# Patient Record
Sex: FEMALE | Race: WHITE | NOT HISPANIC OR LATINO | Employment: FULL TIME | URBAN - NONMETROPOLITAN AREA
[De-identification: names, ages, dates, MRNs, and addresses within clinical notes are randomized per-mention and may not be internally consistent; named-entity substitution may affect disease eponyms.]

---

## 2021-04-30 ENCOUNTER — OFFICE VISIT (OUTPATIENT)
Dept: INTERNAL MEDICINE | Facility: CLINIC | Age: 26
End: 2021-04-30

## 2021-04-30 VITALS
WEIGHT: 222 LBS | TEMPERATURE: 98.3 F | HEIGHT: 69 IN | DIASTOLIC BLOOD PRESSURE: 86 MMHG | BODY MASS INDEX: 32.88 KG/M2 | OXYGEN SATURATION: 99 % | SYSTOLIC BLOOD PRESSURE: 126 MMHG | HEART RATE: 96 BPM

## 2021-04-30 DIAGNOSIS — F51.04 PSYCHOPHYSIOLOGICAL INSOMNIA: ICD-10-CM

## 2021-04-30 DIAGNOSIS — Z76.89 ENCOUNTER TO ESTABLISH CARE: ICD-10-CM

## 2021-04-30 DIAGNOSIS — E66.09 CLASS 1 OBESITY DUE TO EXCESS CALORIES WITHOUT SERIOUS COMORBIDITY WITH BODY MASS INDEX (BMI) OF 32.0 TO 32.9 IN ADULT: ICD-10-CM

## 2021-04-30 DIAGNOSIS — J30.2 SEASONAL ALLERGIC RHINITIS, UNSPECIFIED TRIGGER: ICD-10-CM

## 2021-04-30 DIAGNOSIS — G43.009 MIGRAINE WITHOUT AURA AND WITHOUT STATUS MIGRAINOSUS, NOT INTRACTABLE: Primary | ICD-10-CM

## 2021-04-30 DIAGNOSIS — F41.9 ANXIETY: ICD-10-CM

## 2021-04-30 DIAGNOSIS — R53.83 FATIGUE, UNSPECIFIED TYPE: ICD-10-CM

## 2021-04-30 PROCEDURE — 99204 OFFICE O/P NEW MOD 45 MIN: CPT | Performed by: NURSE PRACTITIONER

## 2021-04-30 RX ORDER — AMITRIPTYLINE HYDROCHLORIDE 10 MG/1
10 TABLET, FILM COATED ORAL NIGHTLY
Qty: 30 TABLET | Refills: 0 | Status: SHIPPED | OUTPATIENT
Start: 2021-04-30 | End: 2021-05-21 | Stop reason: SDUPTHER

## 2021-04-30 RX ORDER — LEVOCETIRIZINE DIHYDROCHLORIDE 5 MG/1
5 TABLET, FILM COATED ORAL EVERY EVENING
Qty: 30 TABLET | Refills: 3 | Status: SHIPPED | OUTPATIENT
Start: 2021-04-30 | End: 2021-08-06

## 2021-04-30 NOTE — PROGRESS NOTES
Sp  Office Visit      Patient Name: Conchita Liao  : 1995   MRN: [id number]   Care Team: Patient Care Team:  Hannah Mckinney APRN as PCP - General (Nurse Practitioner)    Chief Complaint  Headache and Establish Care    Subjective     Subjective      Conchita Liao presents to [facility] to establish care with me and acute concerns of migraines, seasonal allergies, and mild anxiety. PMH includes migraine without aura and no significant medical history. Migraines are brought on by season changes and with her menstral cycle. They are relieved by rest and tylenol helps minimally. Located on right temple. Endorses nausea with her migraines. Has tried immitrex but does not help, has not used in several years. Feels as though migraines are worsening. Happening once per week for about 2 months.  Headache today rated as a 3 out of 10.  No current daily medications. Has never been on preventative therapy.     Allergy symptoms worse with season changes.  Endorses rhinorrhea, sore throat, and ear fullness.  Did have severe nosebleed in the last year that required an ER visit with cauterization by ENT.  She was told at that time she should undergo allergy testing, avoid Flonase, and avoid aspirin.  No more instances since then.  No known bleeding disorder.  Does not take daily oral antihistamine using Benadryl as needed at nightly.    Anxiety symptoms started 5 years ago but has worsened in the past few months Worse with stress and is an  so in busy season currently. Feels overwhelmed like she is constantly about to bubble over, fidgeting/picking at her fingers, crying spells, mild lack of motivation, low energy, trouble falling and staying asleep. No thoughts of suicide/homicide. Has never been to counseling for anxiety symptoms or been treated by a physician. This is the first time she has ever brought it up to a healthcare professional.     PHQ-9 Depression  Screening  Little interest or pleasure in doing things? 1   Feeling down, depressed, or hopeless? 1   Trouble falling or staying asleep, or sleeping too much? 1   Feeling tired or having little energy? 1   Poor appetite or overeating? 0   Feeling bad about yourself - or that you are a failure or have let yourself or your family down? 0   Trouble concentrating on things, such as reading the newspaper or watching television? 1   Moving or speaking so slowly that other people could have noticed? Or the opposite - being so fidgety or restless that you have been moving around a lot more than usual? 0   Thoughts that you would be better off dead, or of hurting yourself in some way? 0   PHQ-9 Total Score 5   If you checked off any problems, how difficult have these problems made it for you to do your work, take care of things at home, or get along with other people? Somewhat difficult         Review of Systems   Constitutional: Positive for fatigue. Negative for appetite change and fever.   HENT: Negative for congestion, sore throat and trouble swallowing.    Eyes: Negative for blurred vision and visual disturbance.   Respiratory: Negative for cough, shortness of breath and wheezing.    Cardiovascular: Negative for chest pain, palpitations and leg swelling.   Gastrointestinal: Negative for abdominal pain, blood in stool, constipation, diarrhea and nausea.   Endocrine: Negative for polydipsia, polyphagia and polyuria.   Genitourinary: Negative for dysuria.   Musculoskeletal: Negative for arthralgias, back pain and myalgias.   Skin: Negative for rash.   Neurological: Positive for headache. Negative for dizziness, weakness and light-headedness.   Psychiatric/Behavioral: Negative for sleep disturbance and depressed mood. The patient is nervous/anxious.        Objective     Objective   Vital Signs:   /86 (BP Location: Left arm, Patient Position: Sitting, Cuff Size: Adult)   Pulse 96   Temp 98.3 °F (36.8 °C) (Temporal)   " Ht 175.3 cm (69\")   Wt 101 kg (222 lb)   SpO2 99%   BMI 32.78 kg/m²     Physical Exam  Vitals and nursing note reviewed.   Constitutional:       General: She is not in acute distress.     Appearance: Normal appearance. She is not toxic-appearing.   HENT:      Right Ear: A middle ear effusion is present. Tympanic membrane is retracted.      Left Ear: A middle ear effusion is present. Tympanic membrane is retracted.      Nose: Rhinorrhea present.      Right Turbinates: Enlarged and pale.      Left Turbinates: Enlarged and pale.   Eyes:      Extraocular Movements: Extraocular movements intact.      Pupils: Pupils are equal, round, and reactive to light.   Neck:      Vascular: No carotid bruit.   Cardiovascular:      Rate and Rhythm: Normal rate and regular rhythm.      Heart sounds: Normal heart sounds. No murmur heard.     Pulmonary:      Effort: Pulmonary effort is normal. No respiratory distress.      Breath sounds: Normal breath sounds. No wheezing.   Abdominal:      General: Bowel sounds are normal. There is no distension.      Palpations: Abdomen is soft.      Tenderness: There is no abdominal tenderness.   Musculoskeletal:         General: Normal range of motion.      Cervical back: Neck supple. No tenderness.   Skin:     General: Skin is warm and dry.      Findings: No rash.   Neurological:      General: No focal deficit present.      Mental Status: She is alert.      Motor: No weakness.      Deep Tendon Reflexes: Reflexes normal.   Psychiatric:         Mood and Affect: Mood normal.         Behavior: Behavior normal.        Assessment / Plan      Assessment/Plan   Problem List Items Addressed This Visit        Neuro    Migraine without aura and without status migrainosus, not intractable - Primary    Relevant Medications    amitriptyline (ELAVIL) 10 MG tablet    Other Relevant Orders    CBC No Differential    Hepatitis C antibody    Comprehensive metabolic panel    TSH Rfx On Abnormal To Free T4    Lipid " panel    Keep headache diary to define triggers to avoid.  No relief with Imitrex, could consider Nurtec for abortive therapy.  Is being placed on amitriptyline for anxiety which could likely serve as preventative therapy for her migraines.  Educated and discussed this in detail with her.  Discussed red flag symptoms she should present to the ED with and she verbalizes understanding.  Requiring no intervention today as her pain is mild.  Can continue using Tylenol as needed.  Neuro exam normal today.      Other Visit Diagnoses     Encounter to establish care        Psychophysiological insomnia        Relevant Orders    CBC No Differential    Hepatitis C antibody    Comprehensive metabolic panel    TSH Rfx On Abnormal To Free T4    Lipid panel    Anxiety        Relevant Orders    CBC No Differential    Hepatitis C antibody    Comprehensive metabolic panel    TSH Rfx On Abnormal To Free T4    Lipid panel    Fatigue, unspecified type        Relevant Orders    CBC No Differential    Hepatitis C antibody    Comprehensive metabolic panel    TSH Rfx On Abnormal To Free T4    Lipid panel    Labs as above to rule out underlying causes for fatigue and anxiety.  Likely related to her mood.  Start amitriptyline.  Discussed side effects in detail including anticholinergic effects, risk for suicidal/homicidal ideations, and drowsiness.  Instructed to take nightly.  Discussed healthy diet, exercise, and coping mechanisms.  If she develops any suicidal/homicidal ideations she will stop the medication immediately, present to the ED for evaluation, and let me know.Will follow-up in 1 month.     Class 1 obesity due to excess calories without serious comorbidity with body mass index (BMI) of 32.0 to 32.9 in adult        Relevant Orders    CBC No Differential    Hepatitis C antibody    Comprehensive metabolic panel    TSH Rfx On Abnormal To Free T4    Lipid panel    Labs as above. Healthy diet and exercise recommended.    Seasonal  allergic rhinitis, unspecified trigger        Relevant Orders    CBC No Differential    Hepatitis C antibody    Comprehensive metabolic panel    TSH Rfx On Abnormal To Free T4    Lipid panel    xyzal nightly.  If symptoms unrelieved by daily antihistamine will refer to allergy for testing and evaluation for injections.            Follow Up   Return in about 1 month (around 5/30/2021), or if symptoms worsen or fail to improve, for Next scheduled follow up.  Patient was given instructions and counseling regarding her condition or for health maintenance advice. Please see specific information pulled into the AVS if appropriate.     KARIN Cooper  [facility]

## 2021-05-01 LAB
ALBUMIN SERPL-MCNC: 5.1 G/DL (ref 3.5–5.2)
ALBUMIN/GLOB SERPL: 2.1 G/DL
ALP SERPL-CCNC: 76 U/L (ref 39–117)
ALT SERPL-CCNC: 15 U/L (ref 1–33)
AST SERPL-CCNC: 14 U/L (ref 1–32)
BILIRUB SERPL-MCNC: 0.3 MG/DL (ref 0–1.2)
BUN SERPL-MCNC: 10 MG/DL (ref 6–20)
BUN/CREAT SERPL: 14.1 (ref 7–25)
CALCIUM SERPL-MCNC: 9.9 MG/DL (ref 8.6–10.5)
CHLORIDE SERPL-SCNC: 104 MMOL/L (ref 98–107)
CHOLEST SERPL-MCNC: 188 MG/DL (ref 0–200)
CO2 SERPL-SCNC: 26 MMOL/L (ref 22–29)
CREAT SERPL-MCNC: 0.71 MG/DL (ref 0.57–1)
ERYTHROCYTE [DISTWIDTH] IN BLOOD BY AUTOMATED COUNT: 11.8 % (ref 12.3–15.4)
GLOBULIN SER CALC-MCNC: 2.4 GM/DL
GLUCOSE SERPL-MCNC: 96 MG/DL (ref 65–99)
HCT VFR BLD AUTO: 41.4 % (ref 34–46.6)
HCV AB S/CO SERPL IA: <0.1 S/CO RATIO (ref 0–0.9)
HDLC SERPL-MCNC: 50 MG/DL (ref 40–60)
HGB BLD-MCNC: 14.1 G/DL (ref 12–15.9)
LDLC SERPL CALC-MCNC: 126 MG/DL (ref 0–100)
MCH RBC QN AUTO: 31.4 PG (ref 26.6–33)
MCHC RBC AUTO-ENTMCNC: 34.1 G/DL (ref 31.5–35.7)
MCV RBC AUTO: 92.2 FL (ref 79–97)
PLATELET # BLD AUTO: 262 10*3/MM3 (ref 140–450)
POTASSIUM SERPL-SCNC: 4.1 MMOL/L (ref 3.5–5.2)
PROT SERPL-MCNC: 7.5 G/DL (ref 6–8.5)
RBC # BLD AUTO: 4.49 10*6/MM3 (ref 3.77–5.28)
SODIUM SERPL-SCNC: 140 MMOL/L (ref 136–145)
TRIGL SERPL-MCNC: 65 MG/DL (ref 0–150)
TSH SERPL DL<=0.005 MIU/L-ACNC: 2.22 UIU/ML (ref 0.27–4.2)
VLDLC SERPL CALC-MCNC: 12 MG/DL (ref 5–40)
WBC # BLD AUTO: 5.41 10*3/MM3 (ref 3.4–10.8)

## 2021-05-21 ENCOUNTER — OFFICE VISIT (OUTPATIENT)
Dept: INTERNAL MEDICINE | Facility: CLINIC | Age: 26
End: 2021-05-21

## 2021-05-21 VITALS
DIASTOLIC BLOOD PRESSURE: 82 MMHG | HEIGHT: 69 IN | OXYGEN SATURATION: 99 % | HEART RATE: 116 BPM | TEMPERATURE: 97.8 F | SYSTOLIC BLOOD PRESSURE: 120 MMHG | BODY MASS INDEX: 33.06 KG/M2 | WEIGHT: 223.2 LBS

## 2021-05-21 DIAGNOSIS — G43.009 MIGRAINE WITHOUT AURA AND WITHOUT STATUS MIGRAINOSUS, NOT INTRACTABLE: ICD-10-CM

## 2021-05-21 DIAGNOSIS — R04.0 FREQUENT NOSEBLEEDS: Primary | ICD-10-CM

## 2021-05-21 DIAGNOSIS — F41.9 ANXIETY: ICD-10-CM

## 2021-05-21 DIAGNOSIS — J32.9 CHRONIC SINUSITIS, UNSPECIFIED LOCATION: ICD-10-CM

## 2021-05-21 PROCEDURE — 99214 OFFICE O/P EST MOD 30 MIN: CPT | Performed by: NURSE PRACTITIONER

## 2021-05-21 RX ORDER — AMITRIPTYLINE HYDROCHLORIDE 10 MG/1
10 TABLET, FILM COATED ORAL NIGHTLY
Qty: 30 TABLET | Refills: 4 | Status: SHIPPED | OUTPATIENT
Start: 2021-05-21 | End: 2021-11-02 | Stop reason: SDUPTHER

## 2021-05-21 NOTE — PROGRESS NOTES
Office Visit      Patient Name: Conchita Liao  : 1995   MRN: [id number]   Care Team: Patient Care Team:  Hannah Mckinney APRN as PCP - General (Family Medicine)    Chief Complaint  Anxiety, Insomnia, and Obesity    Subjective     Subjective      Conchita Liao presents to [facility] for follow-up. Saw 2 weeks ago for migraines and anxiety. Started on amitriptyline, denies adverse effects. Feels like anxiety is slightly better just finished up busy season at work so hoping that helps. Migraines are improved has only had one mild migraine yesterday that was easily aborted by rest. Has been taking xyzal for allergies and has had good results.   History of frequent nosebleeds. In early  had a major nosebleed requiring cauterization by ENT on right nostril. Was seeing ENT and supposed to have more testing done but was told to come back after the pandemic. This was also in [facility] where she was living at the time. Requesting new referral to ENT close to [place] today.     Review of Systems   Constitutional: Negative for appetite change, fatigue and fever.   HENT: Negative for congestion, sore throat and trouble swallowing.    Eyes: Negative for blurred vision and visual disturbance.   Respiratory: Negative for cough, shortness of breath and wheezing.    Cardiovascular: Negative for chest pain, palpitations and leg swelling.   Gastrointestinal: Negative for abdominal pain, blood in stool, constipation, diarrhea and nausea.   Endocrine: Negative for polydipsia, polyphagia and polyuria.   Genitourinary: Negative for dysuria.   Musculoskeletal: Negative for arthralgias, back pain and myalgias.   Skin: Negative for rash.   Neurological: Negative for dizziness, weakness, light-headedness and headache.   Psychiatric/Behavioral: Negative for sleep disturbance and depressed mood. The patient is nervous/anxious.        Objective     Objective   Vital Signs:   BP  "120/82   Pulse 116   Temp 97.8 °F (36.6 °C) (Temporal)   Ht 175.3 cm (69\")   Wt 101 kg (223 lb 3.2 oz)   SpO2 99%   BMI 32.96 kg/m²     Physical Exam  Vitals and nursing note reviewed.   Constitutional:       General: She is not in acute distress.     Appearance: Normal appearance. She is not toxic-appearing.   HENT:      Nose:      Right Turbinates: Swollen.      Left Turbinates: Swollen.   Eyes:      Pupils: Pupils are equal, round, and reactive to light.   Neck:      Vascular: No carotid bruit.   Cardiovascular:      Rate and Rhythm: Normal rate and regular rhythm.      Heart sounds: Normal heart sounds. No murmur heard.     Pulmonary:      Effort: Pulmonary effort is normal. No respiratory distress.      Breath sounds: Normal breath sounds. No wheezing.   Abdominal:      General: Bowel sounds are normal. There is no distension.      Palpations: Abdomen is soft.      Tenderness: There is no abdominal tenderness.   Musculoskeletal:         General: Normal range of motion.      Cervical back: Neck supple. No tenderness.   Skin:     General: Skin is warm and dry.      Findings: No rash.   Neurological:      General: No focal deficit present.      Mental Status: She is alert.   Psychiatric:         Mood and Affect: Mood normal.         Behavior: Behavior normal.          Assessment / Plan      Assessment/Plan   Problem List Items Addressed This Visit        Mental Health    Anxiety    Stable.  Continue Elavil at current dose.  Encourage sleep hygiene, healthy diet, and exercise.       Neuro    Migraine without aura and without status migrainosus, not intractable    Relevant Medications    amitriptyline (ELAVIL) 10 MG tablet    Controlled.  Continue Elavil and ibuprofen as abortive therapy for migraine.  If symptoms worsen have encouraged her to RTC sooner.      Other Visit Diagnoses     Frequent nosebleeds    -  Primary    Relevant Orders    Ambulatory Referral to ENT (Otolaryngology)    Chronic sinusitis, " unspecified location        Relevant Orders    Ambulatory Referral to ENT (Otolaryngology)    Referral to ENT today per her request.           Follow Up   Return in about 3 months (around 8/21/2021) for Next scheduled follow up.  Patient was given instructions and counseling regarding her condition or for health maintenance advice. Please see specific information pulled into the AVS if appropriate.     KARIN Cooper  [facility] [place]

## 2021-08-06 ENCOUNTER — OFFICE VISIT (OUTPATIENT)
Dept: INTERNAL MEDICINE | Facility: CLINIC | Age: 26
End: 2021-08-06

## 2021-08-06 VITALS
SYSTOLIC BLOOD PRESSURE: 124 MMHG | BODY MASS INDEX: 35.22 KG/M2 | TEMPERATURE: 97.6 F | HEART RATE: 97 BPM | RESPIRATION RATE: 16 BRPM | HEIGHT: 69 IN | OXYGEN SATURATION: 97 % | DIASTOLIC BLOOD PRESSURE: 80 MMHG | WEIGHT: 237.8 LBS

## 2021-08-06 DIAGNOSIS — G43.009 MIGRAINE WITHOUT AURA AND WITHOUT STATUS MIGRAINOSUS, NOT INTRACTABLE: ICD-10-CM

## 2021-08-06 DIAGNOSIS — F41.1 GENERALIZED ANXIETY DISORDER: Primary | ICD-10-CM

## 2021-08-06 PROCEDURE — 99213 OFFICE O/P EST LOW 20 MIN: CPT | Performed by: NURSE PRACTITIONER

## 2021-08-06 RX ORDER — AZELASTINE 1 MG/ML
SPRAY, METERED NASAL
COMMUNITY
Start: 2021-06-08

## 2021-08-06 NOTE — PROGRESS NOTES
"  Office Visit      Patient Name: Conchita Liao  : 1995   MRN: [id number]   Care Team: Patient Care Team:  Hannah Mckinney APRN as PCP - General (Family Medicine)    Chief Complaint  Anxiety and Headache    Subjective     Subjective      Conchita Liao presents to [facility] for follow-up. Has been seeing ENT for sinus/allergy problems. Started on sublingual immunotherapy about 10 days ago. Not noticed a big difference in her allergies. She was required to be off of her amitryptilline during testing and noted a big increase in anxiety, started back and lessened some but still endorsing some anxious thoughts and insomnia. Migraines are controlled, has only had 1 since previous visit 3 months ago. Taking sublingual therapy at night with her amitriptyline. Is more busy with work and more stressed lately. No suicidal or homicidal thoughts.     Review of Systems   Constitutional: Negative for chills, fatigue and fever.   HENT: Negative for congestion, postnasal drip, sinus pressure, sneezing, sore throat, swollen glands and trouble swallowing.    Respiratory: Negative for cough, shortness of breath and wheezing.    Cardiovascular: Negative for chest pain.   Gastrointestinal: Negative for abdominal pain, diarrhea, nausea and vomiting.   Neurological: Negative for headache.   Psychiatric/Behavioral: Positive for sleep disturbance. The patient is nervous/anxious.        Objective     Objective   Vital Signs:   /80   Pulse 97   Temp 97.6 °F (36.4 °C) (Temporal)   Resp 16   Ht 175.3 cm (69\")   Wt 108 kg (237 lb 12.8 oz)   SpO2 97%   BMI 35.12 kg/m²     Physical Exam  Vitals and nursing note reviewed.   Constitutional:       General: She is not in acute distress.     Appearance: Normal appearance. She is not toxic-appearing.   Eyes:      Pupils: Pupils are equal, round, and reactive to light.   Neck:      Vascular: No carotid bruit.   Cardiovascular:      Rate and " Rhythm: Normal rate and regular rhythm.      Heart sounds: Normal heart sounds. No murmur heard.     Pulmonary:      Effort: Pulmonary effort is normal. No respiratory distress.      Breath sounds: Normal breath sounds. No wheezing.   Abdominal:      General: Bowel sounds are normal. There is no distension.      Palpations: Abdomen is soft.      Tenderness: There is no abdominal tenderness.   Musculoskeletal:         General: Normal range of motion.      Cervical back: Neck supple. No tenderness.   Skin:     General: Skin is warm and dry.      Findings: No rash.   Neurological:      General: No focal deficit present.      Mental Status: She is alert.   Psychiatric:         Mood and Affect: Mood normal.         Behavior: Behavior normal.          Assessment / Plan      Assessment/Plan   Problem List Items Addressed This Visit        Neuro    Migraine without aura and without status migrainosus, not intractable    Controlled. Continue amitriptyline for preventative therapy as prescribed. Avoid triggers and stay hydrated with water.       Other Visit Diagnoses     Generalized anxiety disorder    -  Primary    Slight increase in symptoms. Discussed sleep hygiene, stress management, healthy diet, and daily exercise. Continue amitriptyline for now. Advised to change sublingual immunotherapy to morning dosing to see if this aids in symptoms. If symptoms are not improved with return sooner. Follow-up in 3 months for annual physical.            Follow Up   Return in about 3 months (around 11/6/2021) for Annual.  Patient was given instructions and counseling regarding her condition or for health maintenance advice. Please see specific information pulled into the AVS if appropriate.     KARIN Cooper  [facility]

## 2021-11-09 RX ORDER — AMITRIPTYLINE HYDROCHLORIDE 10 MG/1
10 TABLET, FILM COATED ORAL NIGHTLY
Qty: 30 TABLET | Refills: 4 | Status: SHIPPED | OUTPATIENT
Start: 2021-11-09 | End: 2022-04-19 | Stop reason: SDUPTHER

## 2021-11-09 NOTE — TELEPHONE ENCOUNTER
Caller: Conchita SOTO    Relationship: Self    Best call back number: [phone]    Requested Prescriptions:   Requested Prescriptions     Pending Prescriptions Disp Refills   • amitriptyline (ELAVIL) 10 MG tablet 30 tablet 4     Sig: Take 1 tablet by mouth Every Night.        Pharmacy where request should be sent: [facility] [address] [phone] [facility] [phone] [facility]     Additional details provided by patient: PATIENT STATES THAT SHE IS COMPLETLEY OUT OF THIS MEDICATION.    Does the patient have less than a 3 day supply:  [x] Yes  [] No    Alvin Wen Rep   11/09/21 11:33 EST

## 2021-12-03 ENCOUNTER — OFFICE VISIT (OUTPATIENT)
Dept: INTERNAL MEDICINE | Facility: CLINIC | Age: 26
End: 2021-12-03

## 2021-12-03 VITALS
HEIGHT: 69 IN | BODY MASS INDEX: 35.57 KG/M2 | TEMPERATURE: 97.1 F | OXYGEN SATURATION: 96 % | DIASTOLIC BLOOD PRESSURE: 68 MMHG | SYSTOLIC BLOOD PRESSURE: 110 MMHG | WEIGHT: 240.12 LBS | HEART RATE: 108 BPM

## 2021-12-03 DIAGNOSIS — G43.009 MIGRAINE WITHOUT AURA AND WITHOUT STATUS MIGRAINOSUS, NOT INTRACTABLE: ICD-10-CM

## 2021-12-03 DIAGNOSIS — E66.09 CLASS 2 OBESITY DUE TO EXCESS CALORIES WITHOUT SERIOUS COMORBIDITY WITH BODY MASS INDEX (BMI) OF 35.0 TO 35.9 IN ADULT: ICD-10-CM

## 2021-12-03 DIAGNOSIS — Z71.85 IMMUNIZATION COUNSELING: ICD-10-CM

## 2021-12-03 DIAGNOSIS — F41.9 ANXIETY: ICD-10-CM

## 2021-12-03 DIAGNOSIS — Z12.4 SCREENING FOR CERVICAL CANCER: ICD-10-CM

## 2021-12-03 DIAGNOSIS — Z01.419 ENCOUNTER FOR WELL WOMAN EXAM WITH ROUTINE GYNECOLOGICAL EXAM: Primary | ICD-10-CM

## 2021-12-03 PROBLEM — L70.0 ACNE VULGARIS: Status: ACTIVE | Noted: 2017-06-08

## 2021-12-03 PROCEDURE — 90471 IMMUNIZATION ADMIN: CPT | Performed by: NURSE PRACTITIONER

## 2021-12-03 PROCEDURE — 99395 PREV VISIT EST AGE 18-39: CPT | Performed by: NURSE PRACTITIONER

## 2021-12-03 PROCEDURE — 90715 TDAP VACCINE 7 YRS/> IM: CPT | Performed by: NURSE PRACTITIONER

## 2021-12-03 RX ORDER — LEVOCETIRIZINE DIHYDROCHLORIDE 5 MG/1
TABLET, FILM COATED ORAL
COMMUNITY
End: 2021-12-03

## 2021-12-03 RX ORDER — GUAIFENESIN, PSEUDOEPHEDRINE HYDROCHLORIDE 600; 60 MG/1; MG/1
TABLET, EXTENDED RELEASE ORAL
COMMUNITY

## 2021-12-03 RX ORDER — EPINEPHRINE 0.3 MG/.3ML
INJECTION SUBCUTANEOUS
COMMUNITY

## 2021-12-03 RX ORDER — BUSPIRONE HYDROCHLORIDE 5 MG/1
5 TABLET ORAL 3 TIMES DAILY PRN
Qty: 90 TABLET | Refills: 0 | Status: SHIPPED | OUTPATIENT
Start: 2021-12-03 | End: 2022-01-05 | Stop reason: SDUPTHER

## 2021-12-03 NOTE — PROGRESS NOTES
Subjective   Conchita SOTO is a 26 y.o. female and is here for a comprehensive physical exam. The patient reports no problems.    HPI: here today for annual exam with pap.   MIgraines- well controlled with current medication regimen. Usually happen around the time her menses would be. She is taking amitryptiline as prescribed and denies adverse effects.   Anxiety- waxes and wanes, sometimes worse than others. She has not tried anything previously for her anxiety. She describes her anxiety as worry that bubbles up and feels like she is going to overflow. She does a lot of fidgeting, increasingly irritable, and also endorses crying spells. These symptoms happen 1-2 times/week. She has never tried any counseling. Denies suicidal thoughts and depressive thoughts.   Not UTD on Tdap.   No acute complaints today.     Health Habits:  Eye exam within last 2 years? Yes.   Dental exam every 6 months? No, will schedule.   Exercise habits: No structured exercise, starting to walk more now that she has a neighborhood to walk in.   Healthy diet? She tries to follow a healthy diet, no certain diet.     The ASCVD Risk score (Agustin CATHY Jr., et al., 2013) failed to calculate for the following reasons:    The 2013 ASCVD risk score is only valid for ages 40 to 79    Do you take any herbs or supplements that were not prescribed by a doctor? no  Are you taking calcium supplements? No  Are you taking aspirin daily? No     History:  LMP: Patient's last menstrual period was 11/05/2021.  Menopause: No  Last pap date: 2018  Abnormal pap? no  Family history of breast or ovarian cancer: no       OB History   No obstetric history on file.      reports being sexually active and has had partner(s) who are male. She reports using the following method of birth control/protection: I.U.D..        The following portions of the patient's history were reviewed and updated as appropriate: She  has a past medical history of Allergic (07/14/21),  Anxiety, and Migraines.  She does not have any pertinent problems on file.  She  has a past surgical history that includes Eye surgery.  Her family history is not on file.  She  reports that she has never smoked. She has never used smokeless tobacco. She reports current alcohol use of about 2.0 standard drinks of alcohol per week. She reports that she does not use drugs.  Current Outpatient Medications   Medication Sig Dispense Refill   • amitriptyline (ELAVIL) 10 MG tablet Take 1 tablet by mouth Every Night. 30 tablet 4   • azelastine (ASTELIN) 0.1 % nasal spray      • EPINEPHrine (EPIPEN) 0.3 MG/0.3ML solution auto-injector injection Auvi-Q 0.3 mg/0.3 mL injection, auto-injector   Take 1 auto as needed by injection route.     • Gentamicin Sulfate (jose's solution for nasal irrigation) into the nostril(s) as directed by provider.     • levonorgestrel (Mirena, 52 MG,) 20 MCG/24HR IUD      • pseudoephedrine-guaifenesin (MUCINEX D)  MG per 12 hr tablet Mucinex D 60 mg-600 mg tablet,extended release   1 po daily with a full glass of water x7 days then prn     • levocetirizine (Xyzal) 5 MG tablet Xyzal 5 mg tablet   Take 1 tablet every day by oral route.       No current facility-administered medications for this visit.       Review of Systems  Do you have pain that bothers you in your daily life? no    Review of Systems   Constitutional: Negative for appetite change, fatigue and fever.   HENT: Negative for congestion, sore throat and trouble swallowing.    Eyes: Negative for blurred vision and visual disturbance.   Respiratory: Negative for cough, shortness of breath and wheezing.    Cardiovascular: Negative for chest pain, palpitations and leg swelling.   Gastrointestinal: Negative for abdominal pain, blood in stool, constipation, diarrhea and nausea.   Endocrine: Negative for polydipsia, polyphagia and polyuria.   Genitourinary: Negative for dysuria.   Musculoskeletal: Negative for arthralgias, back pain and  "myalgias.   Skin: Negative for rash.   Neurological: Negative for dizziness, weakness, light-headedness and headache.   Psychiatric/Behavioral: Negative for sleep disturbance and depressed mood. The patient is nervous/anxious.      Objective   /68   Pulse 108   Temp 97.1 °F (36.2 °C) (Temporal)   Ht 175.3 cm (69\")   Wt 109 kg (240 lb 1.9 oz)   LMP 11/05/2021   SpO2 96%   BMI 35.46 kg/m²     Physical Exam  Vitals and nursing note reviewed. Exam conducted with a chaperone present (Olga Ramos [facility]).   Constitutional:       General: She is not in acute distress.     Appearance: Normal appearance. She is obese.   HENT:      Right Ear: Ear canal normal. A middle ear effusion is present.      Left Ear: Ear canal normal. A middle ear effusion is present.      Nose: Nose normal.      Mouth/Throat:      Mouth: Mucous membranes are moist.      Pharynx: Oropharynx is clear. No posterior oropharyngeal erythema.   Eyes:      Extraocular Movements: Extraocular movements intact.      Pupils: Pupils are equal, round, and reactive to light.   Neck:      Thyroid: No thyroid mass or thyromegaly.      Vascular: No carotid bruit.   Cardiovascular:      Rate and Rhythm: Normal rate and regular rhythm.      Pulses: Normal pulses.      Heart sounds: Normal heart sounds. No murmur heard.      Pulmonary:      Effort: Pulmonary effort is normal.      Breath sounds: Normal breath sounds. No wheezing.   Chest:      Chest wall: No mass or tenderness.   Breasts:      Joey Score is 5.      Right: Normal. No bleeding, inverted nipple, nipple discharge, skin change, axillary adenopathy or supraclavicular adenopathy.      Left: Normal. No bleeding, inverted nipple, nipple discharge, skin change, axillary adenopathy or supraclavicular adenopathy.       Abdominal:      General: Bowel sounds are normal. There is no distension.      Palpations: Abdomen is soft. There is no mass.      Tenderness: There is no abdominal tenderness. "   Genitourinary:     Exam position: Lithotomy position.      Pubic Area: No rash.       Joey stage (genital): 5.      Labia:         Right: No rash or lesion.         Left: No rash or lesion.       Vagina: No vaginal discharge, tenderness, bleeding or lesions.      Cervix: No friability, lesion, erythema or cervical bleeding.      Uterus: Normal. Not enlarged.       Adnexa: Right adnexa normal and left adnexa normal.        Right: No tenderness.          Left: No tenderness.              Comments: IUD strings visualized from os    Musculoskeletal:         General: No deformity. Normal range of motion.      Cervical back: Normal range of motion and neck supple. No muscular tenderness.   Lymphadenopathy:      Head:      Right side of head: No submandibular, tonsillar, preauricular or posterior auricular adenopathy.      Left side of head: No submandibular, tonsillar, preauricular or posterior auricular adenopathy.      Cervical: No cervical adenopathy.      Upper Body:      Right upper body: No supraclavicular, axillary or pectoral adenopathy.      Left upper body: No supraclavicular, axillary or pectoral adenopathy.   Skin:     General: Skin is warm and dry.      Capillary Refill: Capillary refill takes less than 2 seconds.      Findings: No bruising or rash.   Neurological:      Mental Status: She is alert and oriented to person, place, and time.      Gait: Gait normal.      Deep Tendon Reflexes: Reflexes normal.   Psychiatric:         Mood and Affect: Mood normal.         Behavior: Behavior normal.        Assessment/Plan   1. Healthy female exam.      Diagnosis Plan   1. Encounter for well woman exam with routine gynecological exam  Liquid-based Pap Smear, Screening    Health maintenance discussed during visit and information provided in AVS. Tdap updated. Discussed monthly self breast exams, screening pap smear every 3 years if normal, distracted driving, and healthy diet.    2. Screening for cervical cancer   Liquid-based Pap Smear, Screening   3. Anxiety  Ambulatory Referral to Behavioral Health    Uncontrolled. Begin buspar as needed for anxiety. Discussed side effects of the medication and proper administration. Referral to counseling. Recommend healthy diet, exercise, counseling, buspar as needed, and stress management. Continue amitriptyline. Follow-up in 6 weeks.    4. Migraine without aura and without status migrainosus, not intractable  Stable. Continue amitriptyline nightly. Recommend trigger avoidance and stress management.    5. Class 2 obesity due to excess calories without serious comorbidity with body mass index (BMI) of 35.0 to 35.9 in adult  Discussed BMI today and risks associated with obesity (diabetes, cardiovascular disease, and joint pain). Recommend intermittent fasting. Increase lean meats, vegetables, fruit, healthy fats, and water intake. Decrease processed foods, sugar, carbohydrates, soda, and fast food. Educated on portion control and recommendations of moderate intensity exercise 4-5 times/week. This problem will be re-evaluated at next regular visit.      6. Immunization counseling  We discussed the risks and benefits of Boostrix (Tdap).      Counseling was given to inform of the potential signs and symptoms of adverse effects and when to seek medical attention.      The CDC Vaccination Information Sheet (VIS) was given for review prior to administration.  A copy of the VIS was also offered.         2. Patient Counseling:  --Nutrition: Stressed importance of moderation in sodium/caffeine intake, saturated fat and cholesterol, caloric balance, sufficient intake of fresh fruits, vegetables, fiber, calcium, iron, and 1 g folate supplementation if of childbearing age.   --Discussed the issue of calcium supplement, and the daily use of baby aspirin if applicable.             --Mammogram recommended every 2 years from age 40-49 and yearly beginning at age 50.  --Exercise: Stressed the importance  of regular exercise.   --Substance Abuse: Discussed cessation/primary prevention of tobacco (if applicable), alcohol, or other drug use (if applicable); driving or other dangerous activities under the influence; availability of treatment for abuse.    --Sexuality: Discussed sexually transmitted diseases, partner selection, use of condoms, avoidance of unintended pregnancy  and contraceptive alternatives.   --Injury prevention: Discussed safety belts, safety helmets, smoke detector, smoking near bedding or upholstery.   --Dental health: Discussed importance of regular tooth brushing, flossing, and dental visits every 6 months.  --Immunizations reviewed.  --Discussed benefits of screening colonoscopy (if applicable).  --After hours service discussed with patient    3. Discussed the patient's BMI with her.  The BMI is above average; BMI management plan is completed  4. Return in about 6 weeks (around 1/14/2022) for Next scheduled follow up.     Hannah Mckinney, KARIN  12/03/2021  13:09 EST

## 2021-12-03 NOTE — PATIENT INSTRUCTIONS
Health Maintenance, Female  Adopting a healthy lifestyle and getting preventive care are important in promoting health and wellness. Ask your health care provider about:  · The right schedule for you to have regular tests and exams.  · Things you can do on your own to prevent diseases and keep yourself healthy.  What should I know about diet, weight, and exercise?  Eat a healthy diet    · Eat a diet that includes plenty of vegetables, fruits, low-fat dairy products, and lean protein.  · Do not eat a lot of foods that are high in solid fats, added sugars, or sodium.    Maintain a healthy weight  Body mass index (BMI) is used to identify weight problems. It estimates body fat based on height and weight. Your health care provider can help determine your BMI and help you achieve or maintain a healthy weight.  Get regular exercise  Get regular exercise. This is one of the most important things you can do for your health. Most adults should:  · Exercise for at least 150 minutes each week. The exercise should increase your heart rate and make you sweat (moderate-intensity exercise).  · Do strengthening exercises at least twice a week. This is in addition to the moderate-intensity exercise.  · Spend less time sitting. Even light physical activity can be beneficial.  Watch cholesterol and blood lipids  Have your blood tested for lipids and cholesterol at 20 years of age, then have this test every 5 years.  Have your cholesterol levels checked more often if:  · Your lipid or cholesterol levels are high.  · You are older than 40 years of age.  · You are at high risk for heart disease.  What should I know about cancer screening?  Depending on your health history and family history, you may need to have cancer screening at various ages. This may include screening for:  · Breast cancer.  · Cervical cancer.  · Colorectal cancer.  · Skin cancer.  · Lung cancer.  What should I know about heart disease, diabetes, and high blood  pressure?  Blood pressure and heart disease  · High blood pressure causes heart disease and increases the risk of stroke. This is more likely to develop in people who have high blood pressure readings, are of  descent, or are overweight.  · Have your blood pressure checked:  ? Every 3-5 years if you are 18-39 years of age.  ? Every year if you are 40 years old or older.  Diabetes  Have regular diabetes screenings. This checks your fasting blood sugar level. Have the screening done:  · Once every three years after age 40 if you are at a normal weight and have a low risk for diabetes.  · More often and at a younger age if you are overweight or have a high risk for diabetes.  What should I know about preventing infection?  Hepatitis B  If you have a higher risk for hepatitis B, you should be screened for this virus. Talk with your health care provider to find out if you are at risk for hepatitis B infection.  Hepatitis C  Testing is recommended for:  · Everyone born from 1945 through 1965.  · Anyone with known risk factors for hepatitis C.  Sexually transmitted infections (STIs)  · Get screened for STIs, including gonorrhea and chlamydia, if:  ? You are sexually active and are younger than 24 years of age.  ? You are older than 24 years of age and your health care provider tells you that you are at risk for this type of infection.  ? Your sexual activity has changed since you were last screened, and you are at increased risk for chlamydia or gonorrhea. Ask your health care provider if you are at risk.  · Ask your health care provider about whether you are at high risk for HIV. Your health care provider may recommend a prescription medicine to help prevent HIV infection. If you choose to take medicine to prevent HIV, you should first get tested for HIV. You should then be tested every 3 months for as long as you are taking the medicine.  Pregnancy  · If you are about to stop having your period (premenopausal) and  you may become pregnant, seek counseling before you get pregnant.  · Take 400 to 800 micrograms (mcg) of folic acid every day if you become pregnant.  · Ask for birth control (contraception) if you want to prevent pregnancy.  Osteoporosis and menopause  Osteoporosis is a disease in which the bones lose minerals and strength with aging. This can result in bone fractures. If you are 65 years old or older, or if you are at risk for osteoporosis and fractures, ask your health care provider if you should:  · Be screened for bone loss.  · Take a calcium or vitamin D supplement to lower your risk of fractures.  · Be given hormone replacement therapy (HRT) to treat symptoms of menopause.  Follow these instructions at home:  Lifestyle  · Do not use any products that contain nicotine or tobacco, such as cigarettes, e-cigarettes, and chewing tobacco. If you need help quitting, ask your health care provider.  · Do not use street drugs.  · Do not share needles.  · Ask your health care provider for help if you need support or information about quitting drugs.  Alcohol use  · Do not drink alcohol if:  ? Your health care provider tells you not to drink.  ? You are pregnant, may be pregnant, or are planning to become pregnant.  · If you drink alcohol:  ? Limit how much you use to 0-1 drink a day.  ? Limit intake if you are breastfeeding.  · Be aware of how much alcohol is in your drink. In the [place] one drink equals one 12 oz bottle of beer (355 mL), one 5 oz glass of wine (148 mL), or one 1½ oz glass of hard liquor (44 mL).  General instructions  · Schedule regular health, dental, and eye exams.  · Stay current with your vaccines.  · Tell your health care provider if:  ? You often feel depressed.  ? You have ever been abused or do not feel safe at home.  Summary  · Adopting a healthy lifestyle and getting preventive care are important in promoting health and wellness.  · Follow your health care provider's instructions about healthy  diet, exercising, and getting tested or screened for diseases.  · Follow your health care provider's instructions on monitoring your cholesterol and blood pressure.  This information is not intended to replace advice given to you by your health care provider. Make sure you discuss any questions you have with your health care provider.  Document Revised: 12/11/2019 Document Reviewed: 12/11/2019  [facility] Patient Education © 2021 [employer]    Managing Anxiety, Adult  After being diagnosed with an anxiety disorder, you may be relieved to know why you have felt or behaved a certain way. You may also feel overwhelmed about the treatment ahead and what it will mean for your life. With care and support, you can manage this condition and recover from it.  How to manage lifestyle changes  Managing stress and anxiety    Stress is your body's reaction to life changes and events, both good and bad. Most stress will last just a few hours, but stress can be ongoing and can lead to more than just stress. Although stress can play a major role in anxiety, it is not the same as anxiety. Stress is usually caused by something external, such as a deadline, test, or competition. Stress normally passes after the triggering event has ended.   Anxiety is caused by something internal, such as imagining a terrible outcome or worrying that something will go wrong that will devastate you. Anxiety often does not go away even after the triggering event is over, and it can become long-term (chronic) worry. It is important to understand the differences between stress and anxiety and to manage your stress effectively so that it does not lead to an anxious response.  Talk with your health care provider or a counselor to learn more about reducing anxiety and stress. He or she may suggest tension reduction techniques, such as:  · Music therapy. This can include creating or listening to music that you enjoy and that inspires  you.  · Mindfulness-based meditation. This involves being aware of your normal breaths while not trying to control your breathing. It can be done while sitting or walking.  · Centering prayer. This involves focusing on a word, phrase, or sacred image that means something to you and brings you peace.  · Deep breathing. To do this, expand your stomach and inhale slowly through your nose. Hold your breath for 3-5 seconds. Then exhale slowly, letting your stomach muscles relax.  · Self-talk. This involves identifying thought patterns that lead to anxiety reactions and changing those patterns.  · Muscle relaxation. This involves tensing muscles and then relaxing them.  Choose a tension reduction technique that suits your lifestyle and personality. These techniques take time and practice. Set aside 5-15 minutes a day to do them. Therapists can offer counseling and training in these techniques. The training to help with anxiety may be covered by some insurance plans. Other things you can do to manage stress and anxiety include:  · Keeping a stress/anxiety diary. This can help you learn what triggers your reaction and then learn ways to manage your response.  · Thinking about how you react to certain situations. You may not be able to control everything, but you can control your response.  · Making time for activities that help you relax and not feeling guilty about spending your time in this way.  · Visual imagery and yoga can help you stay calm and relax.    Medicines  Medicines can help ease symptoms. Medicines for anxiety include:  · Anti-anxiety drugs.  · Antidepressants.  Medicines are often used as a primary treatment for anxiety disorder. Medicines will be prescribed by a health care provider. When used together, medicines, psychotherapy, and tension reduction techniques may be the most effective treatment.  Relationships  Relationships can play a big part in helping you recover. Try to spend more time connecting  with trusted friends and family members. Consider going to couples counseling, taking family education classes, or going to family therapy. Therapy can help you and others better understand your condition.  How to recognize changes in your anxiety  Everyone responds differently to treatment for anxiety. Recovery from anxiety happens when symptoms decrease and stop interfering with your daily activities at home or work. This may mean that you will start to:  · Have better concentration and focus. Worry will interfere less in your daily thinking.  · Sleep better.  · Be less irritable.  · Have more energy.  · Have improved memory.  It is important to recognize when your condition is getting worse. Contact your health care provider if your symptoms interfere with home or work and you feel like your condition is not improving.  Follow these instructions at home:  Activity  · Exercise. Most adults should do the following:  ? Exercise for at least 150 minutes each week. The exercise should increase your heart rate and make you sweat (moderate-intensity exercise).  ? Strengthening exercises at least twice a week.  · Get the right amount and quality of sleep. Most adults need 7-9 hours of sleep each night.  Lifestyle    · Eat a healthy diet that includes plenty of vegetables, fruits, whole grains, low-fat dairy products, and lean protein. Do not eat a lot of foods that are high in solid fats, added sugars, or salt.  · Make choices that simplify your life.  · Do not use any products that contain nicotine or tobacco, such as cigarettes, e-cigarettes, and chewing tobacco. If you need help quitting, ask your health care provider.  · Avoid caffeine, alcohol, and certain over-the-counter cold medicines. These may make you feel worse. Ask your pharmacist which medicines to avoid.    General instructions  · Take over-the-counter and prescription medicines only as told by your health care provider.  · Keep all follow-up visits as  told by your health care provider. This is important.  Where to find support  You can get help and support from these sources:  · Self-help groups.  · Online and community organizations.  · A trusted spiritual leader.  · Couples counseling.  · Family education classes.  · Family therapy.  Where to find more information  You may find that joining a support group helps you deal with your anxiety. The following sources can help you locate counselors or support groups near you:  · Mental Health [place]: www.mentalhealth[place].net  · Anxiety and Depression Association of [place] (ADAA): www.adaa.org  · National [place] [employer] Mental Illness (AZALEA): www.azalea.org  Contact a health care provider if you:  · Have a hard time staying focused or finishing daily tasks.  · Spend many hours a day feeling worried about everyday life.  · Become exhausted by worry.  · Start to have headaches, feel tense, or have nausea.  · Urinate more than normal.  · Have diarrhea.  Get help right away if you have:  · A racing heart and shortness of breath.  · Thoughts of hurting yourself or others.  If you ever feel like you may hurt yourself or others, or have thoughts about taking your own life, get help right away. You can go to your nearest emergency department or call:  · Your local emergency services (911 in the [place]  · A suicide crisis helpline, such as the National Suicide Prevention Lifeline at 1-772.447.4803. This is open 24 hours a day.  Summary  · Taking steps to learn and use tension reduction techniques can help calm you and help prevent triggering an anxiety reaction.  · When used together, medicines, psychotherapy, and tension reduction techniques may be the most effective treatment.  · Family, friends, and partners can play a big part in helping you recover from an anxiety disorder.  This information is not intended to replace advice given to you by your health care provider. Make sure you discuss any questions you have with your  health care provider.  Document Revised: 05/19/2020 Document Reviewed: 05/19/2020  [employer]vier Patient Education © 2021 [employer].

## 2021-12-08 DIAGNOSIS — Z12.4 SCREENING FOR CERVICAL CANCER: ICD-10-CM

## 2021-12-08 DIAGNOSIS — Z01.419 ENCOUNTER FOR WELL WOMAN EXAM WITH ROUTINE GYNECOLOGICAL EXAM: ICD-10-CM

## 2022-01-05 DIAGNOSIS — F41.1 GENERALIZED ANXIETY DISORDER: Primary | ICD-10-CM

## 2022-01-05 RX ORDER — BUSPIRONE HYDROCHLORIDE 5 MG/1
5 TABLET ORAL 3 TIMES DAILY PRN
Qty: 90 TABLET | Refills: 0 | Status: SHIPPED | OUTPATIENT
Start: 2022-01-05 | End: 2022-01-14 | Stop reason: SDUPTHER

## 2022-01-05 NOTE — TELEPHONE ENCOUNTER
Rx Refill Note  Requested Prescriptions      No prescriptions requested or ordered in this encounter      Last office visit with prescribing clinician: 12/3/2021      Next office visit with prescribing clinician: 1/14/2022            ANDREW GARCÍA MA  01/05/22, 12:29 EST

## 2022-01-14 ENCOUNTER — OFFICE VISIT (OUTPATIENT)
Dept: INTERNAL MEDICINE | Facility: CLINIC | Age: 27
End: 2022-01-14

## 2022-01-14 VITALS
OXYGEN SATURATION: 98 % | DIASTOLIC BLOOD PRESSURE: 84 MMHG | TEMPERATURE: 97.8 F | WEIGHT: 240.4 LBS | SYSTOLIC BLOOD PRESSURE: 129 MMHG | HEIGHT: 69 IN | HEART RATE: 98 BPM | BODY MASS INDEX: 35.6 KG/M2

## 2022-01-14 DIAGNOSIS — F41.1 GENERALIZED ANXIETY DISORDER: Primary | ICD-10-CM

## 2022-01-14 PROCEDURE — 99213 OFFICE O/P EST LOW 20 MIN: CPT | Performed by: NURSE PRACTITIONER

## 2022-01-14 RX ORDER — BUSPIRONE HYDROCHLORIDE 5 MG/1
5 TABLET ORAL 3 TIMES DAILY PRN
Qty: 90 TABLET | Refills: 3 | Status: SHIPPED | OUTPATIENT
Start: 2022-01-14

## 2022-01-14 NOTE — PROGRESS NOTES
"  Office Visit      Patient Name: Conchita SOTO  : 1995   MRN: [id number]   Care Team: Patient Care Team:  Hannah Mckinney APRN as PCP - General (Family Medicine)    Chief Complaint  Anxiety (prescribed Buspar; patient expressed that the medication has helped quite a bit, denies any side effects )    Subjective     Subjective      Conchita SOTO presents to [facility] for anxiety follow-up. She was started on buspar as needed last visit. She is taking as needed. It is working well for her anxiety and she is happy with the medication. She is continuing amitryptiline and continues to be beneficial for her sleep as well as her migraines. She is coming up on busy season at her work so may be under more stress.   Denies nausea, vomiting, suicidal/homicidal thoughts, depressive thoughts, and panic attacks.  Has an appointment scheduled with counseling in March.  She has no acute problems today.    Review of Systems   Constitutional: Negative for chills, fatigue, fever and unexpected weight loss.   Respiratory: Negative for shortness of breath.    Cardiovascular: Negative for chest pain.   Gastrointestinal: Negative for abdominal pain.   Musculoskeletal: Negative for arthralgias, back pain, gait problem, joint swelling, myalgias and neck pain.   Skin: Negative for rash.   Neurological: Negative for numbness, headache and confusion.   Psychiatric/Behavioral: Negative for sleep disturbance and depressed mood. The patient is not nervous/anxious.        Objective     Objective   Vital Signs:   /84   Pulse 98   Temp 97.8 °F (36.6 °C) (Temporal)   Ht 175.3 cm (69\")   Wt 109 kg (240 lb 6.4 oz)   SpO2 98%   BMI 35.50 kg/m²     Physical Exam  Vitals and nursing note reviewed.   Constitutional:       General: She is not in acute distress.     Appearance: Normal appearance. She is obese. She is not toxic-appearing.   Eyes:      Pupils: Pupils are equal, round, and reactive to " light.   Cardiovascular:      Rate and Rhythm: Normal rate and regular rhythm.      Heart sounds: Normal heart sounds. No murmur heard.      Pulmonary:      Effort: Pulmonary effort is normal. No respiratory distress.      Breath sounds: Normal breath sounds. No wheezing.   Skin:     General: Skin is warm and dry.      Findings: No rash.   Neurological:      General: No focal deficit present.      Mental Status: She is alert.   Psychiatric:         Mood and Affect: Mood normal.         Behavior: Behavior normal.       Assessment / Plan      Assessment/Plan   Problem List Items Addressed This Visit     None      Visit Diagnoses     Generalized anxiety disorder    -  Primary    Relevant Medications    busPIRone (BUSPAR) 5 MG tablet    Stable and tolerating buspirone as needed.  Continue buspirone, may take 3 times daily and can take daily during her work busy season if anxiety worsens.  Keep appointment with counseling.  Recommend healthy diet, exercise as tolerated, daily sun exposure, sleep hygiene, continue amitriptyline nightly, and stress management.  She will follow-up with me when it is time for her physical exam and we will draw labs at that time.           Follow Up   Return in about 11 months (around 12/14/2022) for Annual.  Patient was given instructions and counseling regarding her condition or for health maintenance advice. Please see specific information pulled into the AVS if appropriate.     KARIN Cooper  [facility]

## 2022-03-29 ENCOUNTER — OFFICE VISIT (OUTPATIENT)
Dept: BEHAVIORAL HEALTH | Facility: CLINIC | Age: 27
End: 2022-03-29

## 2022-03-29 VITALS — HEIGHT: 69 IN | BODY MASS INDEX: 35.55 KG/M2 | WEIGHT: 240 LBS

## 2022-03-29 DIAGNOSIS — R45.4 ANGER: ICD-10-CM

## 2022-03-29 DIAGNOSIS — F33.0 MILD EPISODE OF RECURRENT MAJOR DEPRESSIVE DISORDER: Primary | ICD-10-CM

## 2022-03-29 PROCEDURE — 90791 PSYCH DIAGNOSTIC EVALUATION: CPT | Performed by: COUNSELOR

## 2022-04-14 NOTE — PROGRESS NOTES
Initial Therapy Office Visit      Date: 2022     Patient Name: Conchita SOTO  : 1995   Time In: 8:38  Time Out: 9:16    PCP: Hannah Mckinney APRN    Chief Complaint:     ICD-10-CM ICD-9-CM   1. Mild episode of recurrent major depressive disorder (HCC)  F33.0 296.31   2. Anger  R45.4 799.29       History of Present Illness: Conchita SOTO is a 26 y.o. female who presents today for initial therapy session. Patient arrived for session on time, clean and casually dressed without evidence of intoxication, withdrawal, or perceptual disturbance. Patient was cooperative and agreeable to treatment and interacted with therapist. The patient was seen at the [place] office today. The patient comes to therapy with a goal of finding ways to calm down from anxiety that has been built up. The patient discusses that she has anxiety, depression, and she is angry. The patient states that her medication has been going fine. The patient states that her Elavil (to help her sleep) has been working good, and she only needs the Buspar every 3 days. The patient states that she has no motivation for anything, has crying spells, and no energy. Since things are somewhat better because of the medication, she still has some anxiety, and depressive feelings.         Subjective      Review of Systems:   The following portions of the patient's history were reviewed and updated as appropriate: allergies, current medications, past family history, past medical history, past social history, past surgical history and problem list.    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    Social History:   Social History     Socioeconomic History   • Marital status:     Tobacco Use   • Smoking status: Never Smoker   • Smokeless tobacco: Never Used   Vaping Use   • Vaping Use: Never used   Substance and Sexual Activity   • Alcohol use: Yes     Alcohol/week: 2.0 standard drinks     Types: 2 Glasses of wine per week     Comment: occasionly 1-2 drinks/week   • Drug use: Never   • Sexual activity: Yes     Partners: Male     Birth control/protection: I.U.D.       Trauma History:  None.     Spiritual:  unknown    Mental Illness and/or Substance Abuse: The patient has been diagnosed with Anxiety, and Depression.      History: No    Medication:     Current Outpatient Medications:   •  amitriptyline (ELAVIL) 10 MG tablet, Take 1 tablet by mouth Every Night., Disp: 30 tablet, Rfl: 4  •  azelastine (ASTELIN) 0.1 % nasal spray, , Disp: , Rfl:   •  busPIRone (BUSPAR) 5 MG tablet, Take 1 tablet by mouth 3 (Three) Times a Day As Needed (anxiety)., Disp: 90 tablet, Rfl: 3  •  EPINEPHrine (EPIPEN) 0.3 MG/0.3ML solution auto-injector injection, Auvi-Q 0.3 mg/0.3 mL injection, auto-injector  Take 1 auto as needed by injection route., Disp: , Rfl:   •  Gentamicin Sulfate (jose's solution for nasal irrigation), into the nostril(s) as directed by provider., Disp: , Rfl:   •  levonorgestrel (Mirena, 52 MG,) 20 MCG/24HR IUD, , Disp: , Rfl:   •  pseudoephedrine-guaifenesin (MUCINEX D)  MG per 12 hr tablet, Mucinex D 60 mg-600 mg tablet,extended release  1 po daily with a full glass of water x7 days then prn, Disp: , Rfl:     Allergies:   No Known Allergies    Educational/Work History:  Highest level of education obtained: The patient graduated from [employer] with a BBA in Accounting, and a BM in music.   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     Significant Life Events:   Patient been through or witnessed a divorce? no  None.     Patient experienced a death / loss of relationship? yes  The patients  "grandmother  suddenly in 2018.    Patient experienced a major accident or tragic events? no  None.     Patient experienced any other significant life events or trauma (such as verbal, physical, sexual abuse)? no  None.     Legal History:  The patient has no significant history of legal issues.  Court-ordered: No    PHQ-9 Score:   PHQ-9 Total Score:       MITUL 7: Total Score: unknown     Objective     Physical Exam:   Height 175.3 cm (69\"), weight 109 kg (240 lb). Body mass index is 35.44 kg/m².     Physical Exam    Patient's Support Network Includes:      Prognosis: Good with Ongoing Treatment     Mental Status Exam:   Hygiene:   good  Cooperation:  Cooperative  Eye Contact:  Good  Psychomotor Behavior:  Appropriate  Affect:  Appropriate  Mood: normal  Hopelessness: Denies  Speech:  Normal  Thought Process:  Goal directed  Thought Content:  Normal  Suicidal:  None  Homicidal:  None  Hallucinations:  None  Delusion:  None  Memory:  Intact  Orientation:  Person, Place, Time and Situation  Reliability:  good  Insight:  Good  Judgement:  Good  Impulse Control:  Good  Physical/Medical Issues:  No      Assessment / Plan      Assessment/Plan:   Diagnoses and all orders for this visit:    1. Mild episode of recurrent major depressive disorder (HCC) (Primary)    2. Anger         1. The therapist will continue to promote the therapeutic alliance, address the patients issues and concerns, and strengthen her self awareness, insights, and positive coping skills. These positive coping skills include: visualization, music, art, breathing, exercise, and positive self talk.     TREATMENT PLAN/GOALS: Continue supportive psychotherapy efforts and medications as indicated. Treatment and medication options discussed during today's visit. Patient ackowledged and verbally consented to continue with current treatment plan and was educated on the importance of compliance with treatment and follow-up appointments.     Counseled " patient regarding multimodal approach with healthy nutrition, healthy sleep, regular physical activity, social activities, counseling, and medications.      Coping skills reviewed and encouraged positive framing of thoughts. No suicidal ideation or homicidal ideation at this time.      Assisted patient in processing above session content; acknowledged and normalized patient’s thoughts, feelings, and concerns.  Applied  positive coping skills and behavior management in session.    Allowed patient to freely discuss issues without interruption or judgment. Provided safe, confidential environment to facilitate the development of positive therapeutic relationship and encourage open, honest communication. Assisted patient in identifying risk factors which would indicate the need for higher level of care including thoughts to harm self or others and/or self-harming behavior and encouraged patient to contact this office, call 911, or present to the nearest emergency room should any of these events occur. Discussed crisis intervention services and means to access.     Follow Up:   Return for Recheck.    FAIZAN Glover  This document has been electronically signed by FAIZAN Glover  April 14, 2022 15:18 EDT    Please note that portions of this note were completed with a voice recognition program. Efforts were made to edit dictation, but occasionally words are mistranscribed.

## 2022-04-19 RX ORDER — AMITRIPTYLINE HYDROCHLORIDE 10 MG/1
10 TABLET, FILM COATED ORAL NIGHTLY
Qty: 30 TABLET | Refills: 4 | Status: SHIPPED | OUTPATIENT
Start: 2022-04-19 | End: 2022-09-22 | Stop reason: SDUPTHER

## 2022-04-19 NOTE — TELEPHONE ENCOUNTER
Rx Refill Note  Requested Prescriptions     Pending Prescriptions Disp Refills   • amitriptyline (ELAVIL) 10 MG tablet 30 tablet 4     Sig: Take 1 tablet by mouth Every Night.      Last office visit with prescribing clinician: 1/14/2022      Next office visit with prescribing clinician: 12/9/2022            ANDREW GARCÍA MA  04/19/22, 07:45 EDT

## 2022-05-09 ENCOUNTER — TELEMEDICINE (OUTPATIENT)
Dept: BEHAVIORAL HEALTH | Facility: CLINIC | Age: 27
End: 2022-05-09

## 2022-05-09 DIAGNOSIS — F33.0 MILD EPISODE OF RECURRENT MAJOR DEPRESSIVE DISORDER: Primary | ICD-10-CM

## 2022-05-09 DIAGNOSIS — R45.4 ANGER: ICD-10-CM

## 2022-05-09 PROCEDURE — 90832 PSYTX W PT 30 MINUTES: CPT | Performed by: COUNSELOR

## 2022-05-16 NOTE — PROGRESS NOTES
Telehealth Video Therapy Visit      Date: 2022     Patient Name: Conchita SOTO  : 1995   Time In: 1125  Time Out: 12:00    Disclosure: You have chosen to receive care through a video visit today. Do you consent to use the phone and/or a video visit for your behavioral health today? Yes         Chief Complaint:  Mild Episode of Recurrent Major Depressive Episode Disorder                                 F33.0                                 Anger      History of Present Illness: Conchita SOTO is a 26 y.o. female that has agreed to be seen via a telehealth visit today. Conchita SOTO has stated that they are in a secure environment for this session. The provider is located at [facility], [address] [place] [address], [facility] [place] . The patient is seen remotely at her  home, using Epic Video Visit (HIPAA compliant).  The patient states that she has been doing a lot better since her busy texting that is over with the patient states that her medication has been doing good to help her anxiety.  The patient states that she has had some anxiety/anger at work, mainly during the tax season the patient discussed that she is not trying to read more in the evenings to help her decompress from the day.  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  Mainly the patient has not had any problems with her sleep pattern Tu night she has some problems numbers very difficult for her to get to sleep.  The patient is appropriate for a telemedicine visit. I identified myself Keyana Portillo [facility]  along with my credentials of [facility].@ can refuse to be seen remotely at any time. The electronic data is encrypted and password protected, and the patient has been advised of the potential risks to privacy, not withstanding such measures.    Subjective      Review of Systems:   The following portions of the patient's history were reviewed and  updated as appropriate: allergies, current medications, past family history, past medical history, past social history, past surgical history and problem list.    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    Social History:   Social History     Socioeconomic History   • Marital status:    Tobacco Use   • Smoking status: Never Smoker   • Smokeless tobacco: Never Used   Vaping Use   • Vaping Use: Never used   Substance and Sexual Activity   • Alcohol use: Yes     Alcohol/week: 2.0 standard drinks     Types: 2 Glasses of wine per week     Comment: occasionly 1-2 drinks/week   • Drug use: Never   • Sexual activity: Yes     Partners: Male     Birth control/protection: I.U.D.       Medications:     Current Outpatient Medications:   •  amitriptyline (ELAVIL) 10 MG tablet, Take 1 tablet by mouth Every Night., Disp: 30 tablet, Rfl: 4  •  azelastine (ASTELIN) 0.1 % nasal spray, , Disp: , Rfl:   •  busPIRone (BUSPAR) 5 MG tablet, Take 1 tablet by mouth 3 (Three) Times a Day As Needed (anxiety)., Disp: 90 tablet, Rfl: 3  •  EPINEPHrine (EPIPEN) 0.3 MG/0.3ML solution auto-injector injection, Auvi-Q 0.3 mg/0.3 mL injection, auto-injector  Take 1 auto as needed by injection route., Disp: , Rfl:   •  Gentamicin Sulfate (jose's solution for nasal irrigation), into the nostril(s) as directed by provider., Disp: , Rfl:   •  levonorgestrel (Mirena, 52 MG,) 20 MCG/24HR IUD, , Disp: , Rfl:   •  pseudoephedrine-guaifenesin (MUCINEX D)  MG per 12 hr tablet, Mucinex D 60 mg-600 mg tablet,extended release  1 po daily with a full glass of water x7 days then prn, Disp: , Rfl:     Allergies:   No Known Allergies    PHQ-9 Score:    PHQ-9 Total Score:       Objective     Physical Exam:   There were no vitals taken for this visit. There is no height or weight on file to calculate BMI.     Physical Exam    Patient's Support Network Includes:      Prognosis: Good with Ongoing Treatment     Mental Status Exam:   Hygiene:   good  Cooperation:  Cooperative  Eye Contact:  Good  Psychomotor Behavior:  Appropriate  Affect:  Appropriate  Mood: normal  Hopelessness: Denies  Speech:  Normal  Thought Process:  Goal directed  Thought Content:  Normal  Suicidal:  None  Homicidal:  None  Hallucinations:  None  Delusion:  None  Memory:  Intact  Orientation:  Person, Place, Time and Situation  Reliability:  good  Insight:  Good  Judgement:  Good  Impulse Control:  Good  Physical/Medical Issues:  No      Assessment / Plan      Assessment/Plan:   Diagnoses and all orders for this visit:    1. Mild episode of recurrent major depressive disorder (HCC) (Primary)    2. Anger         1. The therapist will continue to promote the therapeutic alliance, address the patient's issues and concerns, and strengthen her self-awareness, self-esteem, insights, and positive coping skills.  These positive coping skills include visualization, music, breathing, exercising, and positive self talk.    TREATMENT PLAN/GOALS: Continue supportive psychotherapy efforts and medications as indicated. Treatment and medication options discussed during today's visit. Patient ackowledged and verbally consented to continue with current treatment plan and was educated on the importance of compliance with treatment and follow-up appointments.     Counseled patient regarding multimodal approach with healthy nutrition, healthy sleep, regular physical activity, social activities, counseling, and medications.      Coping skills reviewed and encouraged positive framing of thoughts. No suicidal ideation or homicidal ideation at this time.      Assisted patient in processing above session content;  acknowledged and normalized patient’s thoughts, feelings, and concerns.  Applied  positive coping skills and behavior management in session.    Allowed patient to freely discuss issues without interruption or judgment. Provided safe, confidential environment to facilitate the development of positive therapeutic relationship and encourage open, honest communication. Assisted patient in identifying risk factors which would indicate the need for higher level of care including thoughts to harm self or others and/or self-harming behavior and encouraged patient to contact this office, call 911, or present to the nearest emergency room should any of these events occur. Discussed crisis intervention services and means to access.     Follow Up:   No follow-ups on file.    FAIZAN Glover  This document has been electronically signed by FAIZAN Glover  May 16, 2022 16:03 EDT    Please note that portions of this note were completed with a voice recognition program. Efforts were made to edit dictation, but occasionally words are mistranscribed.

## 2022-05-23 ENCOUNTER — TELEMEDICINE (OUTPATIENT)
Dept: BEHAVIORAL HEALTH | Facility: CLINIC | Age: 27
End: 2022-05-23

## 2022-05-23 DIAGNOSIS — F33.0 MILD EPISODE OF RECURRENT MAJOR DEPRESSIVE DISORDER: Primary | ICD-10-CM

## 2022-05-23 PROCEDURE — 90832 PSYTX W PT 30 MINUTES: CPT | Performed by: COUNSELOR

## 2022-06-05 NOTE — PROGRESS NOTES
"     Telehealth Video Therapy Visit      Date: 2022     Patient Name: Conchita SOTO  : 1995   Time In: 9:30  Time Out: 10:00    Disclosure: You have chosen to receive care through a video visit today. Do you consent to use the phone and/or a video visit for your behavioral health today? Yes         Chief Complaint:  Mild Episode of Recurrent Major Depressive Episode Disorder                                 F33.0                                 Anger      History of Present Illness: Conchita SOTO is a 26 y.o. female that has agreed to be seen via a telehealth visit today. Conchita SOTO has stated that they are in a secure environment for this session. The provider is located at [facility], [address] [place] [address], [address], [facility] . The patient is seen remotely at her  home, using Epic Video Visit (HIPAA compliant). The patient discussed that her sleep has been getting better, and she is working hard to keep a structured routine. The patient discussed that lately she has had a case of the \"sads\", but states hat her medication has been doing ok. 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 The patient feel that this is more of a sustainable option for her. The patient states that her medication has been doing ok, and is getting prepared to have a Colonoscopy on the . The patient is appropriate for a telemedicine visit. I identified myself Keyana Portillo [facility]  along with my credentials of [facility].@ can refuse to be seen remotely at any time. The electronic data is encrypted and password protected, and the patient has been advised of the potential risks to privacy, not withstanding such measures.    Subjective      Review of Systems:   The following portions of the patient's history were reviewed and updated as appropriate: " allergies, current medications, past family history, past medical history, past social history, past surgical history and problem list.    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    Social History:   Social History     Socioeconomic History   • Marital status:    Tobacco Use   • Smoking status: Never Smoker   • Smokeless tobacco: Never Used   Vaping Use   • Vaping Use: Never used   Substance and Sexual Activity   • Alcohol use: Yes     Alcohol/week: 2.0 standard drinks     Types: 2 Glasses of wine per week     Comment: occasionly 1-2 drinks/week   • Drug use: Never   • Sexual activity: Yes     Partners: Male     Birth control/protection: I.U.D.       Medications:     Current Outpatient Medications:   •  amitriptyline (ELAVIL) 10 MG tablet, Take 1 tablet by mouth Every Night., Disp: 30 tablet, Rfl: 4  •  azelastine (ASTELIN) 0.1 % nasal spray, , Disp: , Rfl:   •  busPIRone (BUSPAR) 5 MG tablet, Take 1 tablet by mouth 3 (Three) Times a Day As Needed (anxiety)., Disp: 90 tablet, Rfl: 3  •  EPINEPHrine (EPIPEN) 0.3 MG/0.3ML solution auto-injector injection, Auvi-Q 0.3 mg/0.3 mL injection, auto-injector  Take 1 auto as needed by injection route., Disp: , Rfl:   •  Gentamicin Sulfate (jose's solution for nasal irrigation), into the nostril(s) as directed by provider., Disp: , Rfl:   •  levonorgestrel (Mirena, 52 MG,) 20 MCG/24HR IUD, , Disp: , Rfl:   •  pseudoephedrine-guaifenesin (MUCINEX D)  MG per 12 hr tablet, Mucinex D 60 mg-600 mg tablet,extended release  1 po daily with a full glass of water x7 days then prn, Disp: , Rfl:     Allergies:   No Known Allergies    PHQ-9 Score:   PHQ-9 Total Score:        Objective     Physical Exam:   There were no vitals taken for this visit. There is no height or weight on file to calculate BMI.     Physical Exam    Patient's Support Network Includes:      Prognosis: Good with Ongoing Treatment     Mental Status Exam:   Hygiene:   good  Cooperation:  Cooperative  Eye Contact:  Good  Psychomotor Behavior:  Appropriate  Affect:  Appropriate  Mood: normal  Hopelessness: Denies  Speech:  Normal  Thought Process:  Goal directed  Thought Content:  Normal  Suicidal:  None  Homicidal:  None  Hallucinations:  None  Delusion:  None  Memory:  Intact  Orientation:  Person, Place, Time and Situation  Reliability:  good  Insight:  Good  Judgement:  Good  Impulse Control:  Good  Physical/Medical Issues:  No      Assessment / Plan      Assessment/Plan:   Diagnoses and all orders for this visit:    1. Mild episode of recurrent major depressive disorder (HCC) (Primary)         1. The therapist will continue to promote the therapeutic alliance, address the patient's issues and concerns, and strengthen her self-awareness, self-esteem, insights, and positive coping skills.  These positive coping skills include visualization, music, breathing, exercising, and positive self talk.    TREATMENT PLAN/GOALS: Continue supportive psychotherapy efforts and medications as indicated. Treatment and medication options discussed during today's visit. Patient ackowledged and verbally consented to continue with current treatment plan and was educated on the importance of compliance with treatment and follow-up appointments.     Counseled patient regarding multimodal approach with healthy nutrition, healthy sleep, regular physical activity, social activities, counseling, and medications.      Coping skills reviewed and encouraged positive framing of thoughts. No suicidal ideation or homicidal ideation at this time.      Assisted patient in processing above session content; acknowledged and normalized  patient’s thoughts, feelings, and concerns.  Applied  positive coping skills and behavior management in session.    Allowed patient to freely discuss issues without interruption or judgment. Provided safe, confidential environment to facilitate the development of positive therapeutic relationship and encourage open, honest communication. Assisted patient in identifying risk factors which would indicate the need for higher level of care including thoughts to harm self or others and/or self-harming behavior and encouraged patient to contact this office, call 911, or present to the nearest emergency room should any of these events occur. Discussed crisis intervention services and means to access.     Follow Up:   No follow-ups on file.    FAIZAN Glover  This document has been electronically signed by FAIZAN Glover  June 5, 2022 19:54 EDT    Please note that portions of this note were completed with a voice recognition program. Efforts were made to edit dictation, but occasionally words are mistranscribed.

## 2022-06-05 NOTE — PROGRESS NOTES
Telehealth Video Therapy Visit      Date: 2022     Patient Name: Conchita SOTO  : 1995   Time In: 1125  Time Out: 12:00    Disclosure: You have chosen to receive care through a video visit today. Do you consent to use the phone and/or a video visit for your behavioral health today? Yes         Chief Complaint:  Mild Episode of Recurrent Major Depressive Episode Disorder                                 F33.0                                 Anger      History of Present Illness: Conchita SOTO is a 26 y.o. female that has agreed to be seen via a telehealth visit today. Conchita SOTO has stated that they are in a secure environment for this session. The provider is located at [facility], [address] [place] [address], [facility] [place] [place] . The patient is seen remotely at her  home, using Epic Video Visit (HIPAA compliant).  The patient states that she has been doing a lot better since her busy texting that is over with the patient states that her medication has been doing good to help her anxiety.  The patient states that she has had some anxiety/anger at work, mainly during the tax season the patient discussed that she is not trying to read more in the evenings to help her decompress from the day.  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  Mainly the patient has not had any problems with her sleep pattern Tu night she has some problems numbers very difficult for her to get to sleep.  The patient is appropriate for a telemedicine visit. I identified myself Keyana Portillo [facility]  along with my credentials of [facility].@ can refuse to be seen remotely at any time. The electronic data is encrypted and password protected, and the patient has been advised of the potential risks to privacy, not withstanding such measures.    Subjective      Review of Systems:   The following portions of the patient's history were reviewed and  updated as appropriate: allergies, current medications, past family history, past medical history, past social history, past surgical history and problem list.    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    Social History:   Social History     Socioeconomic History   • Marital status:    Tobacco Use   • Smoking status: Never Smoker   • Smokeless tobacco: Never Used   Vaping Use   • Vaping Use: Never used   Substance and Sexual Activity   • Alcohol use: Yes     Alcohol/week: 2.0 standard drinks     Types: 2 Glasses of wine per week     Comment: occasionly 1-2 drinks/week   • Drug use: Never   • Sexual activity: Yes     Partners: Male     Birth control/protection: I.U.D.       Medications:     Current Outpatient Medications:   •  amitriptyline (ELAVIL) 10 MG tablet, Take 1 tablet by mouth Every Night., Disp: 30 tablet, Rfl: 4  •  azelastine (ASTELIN) 0.1 % nasal spray, , Disp: , Rfl:   •  busPIRone (BUSPAR) 5 MG tablet, Take 1 tablet by mouth 3 (Three) Times a Day As Needed (anxiety)., Disp: 90 tablet, Rfl: 3  •  EPINEPHrine (EPIPEN) 0.3 MG/0.3ML solution auto-injector injection, Auvi-Q 0.3 mg/0.3 mL injection, auto-injector  Take 1 auto as needed by injection route., Disp: , Rfl:   •  Gentamicin Sulfate (jose's solution for nasal irrigation), into the nostril(s) as directed by provider., Disp: , Rfl:   •  levonorgestrel (Mirena, 52 MG,) 20 MCG/24HR IUD, , Disp: , Rfl:   •  pseudoephedrine-guaifenesin (MUCINEX D)  MG per 12 hr tablet, Mucinex D 60 mg-600 mg tablet,extended release  1 po daily with a full glass of water x7 days then prn, Disp: , Rfl:     Allergies:   No Known Allergies    PHQ-9 Score:    PHQ-9 Total Score:       Objective     Physical Exam:   There were no vitals taken for this visit. There is no height or weight on file to calculate BMI.     Physical Exam    Patient's Support Network Includes:      Prognosis: Good with Ongoing Treatment     Mental Status Exam:   Hygiene:   good  Cooperation:  Cooperative  Eye Contact:  Good  Psychomotor Behavior:  Appropriate  Affect:  Appropriate  Mood: normal  Hopelessness: Denies  Speech:  Normal  Thought Process:  Goal directed  Thought Content:  Normal  Suicidal:  None  Homicidal:  None  Hallucinations:  None  Delusion:  None  Memory:  Intact  Orientation:  Person, Place, Time and Situation  Reliability:  good  Insight:  Good  Judgement:  Good  Impulse Control:  Good  Physical/Medical Issues:  No      Assessment / Plan      Assessment/Plan:   There are no diagnoses linked to this encounter.     1. The therapist will continue to promote the therapeutic alliance, address the patient's issues and concerns, and strengthen her self-awareness, self-esteem, insights, and positive coping skills.  These positive coping skills include visualization, music, breathing, exercising, and positive self talk.    TREATMENT PLAN/GOALS: Continue supportive psychotherapy efforts and medications as indicated. Treatment and medication options discussed during today's visit. Patient ackowledged and verbally consented to continue with current treatment plan and was educated on the importance of compliance with treatment and follow-up appointments.     Counseled patient regarding multimodal approach with healthy nutrition, healthy sleep, regular physical activity, social activities, counseling, and medications.      Coping skills reviewed and encouraged positive framing of thoughts. No suicidal ideation or homicidal ideation at this time.      Assisted patient in processing above session content; acknowledged and normalized patient’s thoughts, feelings, and concerns.  Applied   positive coping skills and behavior management in session.    Allowed patient to freely discuss issues without interruption or judgment. Provided safe, confidential environment to facilitate the development of positive therapeutic relationship and encourage open, honest communication. Assisted patient in identifying risk factors which would indicate the need for higher level of care including thoughts to harm self or others and/or self-harming behavior and encouraged patient to contact this office, call 911, or present to the nearest emergency room should any of these events occur. Discussed crisis intervention services and means to access.     Follow Up:   No follow-ups on file.    FAIZAN Glover  This document has been electronically signed by FAIZAN Glover  June 5, 2022 19:49 EDT    Please note that portions of this note were completed with a voice recognition program. Efforts were made to edit dictation, but occasionally words are mistranscribed.

## 2022-06-08 ENCOUNTER — OFFICE VISIT (OUTPATIENT)
Dept: BEHAVIORAL HEALTH | Facility: CLINIC | Age: 27
End: 2022-06-08

## 2022-06-08 VITALS — HEIGHT: 69 IN | WEIGHT: 243.6 LBS | BODY MASS INDEX: 36.08 KG/M2

## 2022-06-08 DIAGNOSIS — F33.0 MILD EPISODE OF RECURRENT MAJOR DEPRESSIVE DISORDER: Primary | ICD-10-CM

## 2022-06-08 PROCEDURE — 90832 PSYTX W PT 30 MINUTES: CPT | Performed by: COUNSELOR

## 2022-06-30 ENCOUNTER — OFFICE VISIT (OUTPATIENT)
Dept: BEHAVIORAL HEALTH | Facility: CLINIC | Age: 27
End: 2022-06-30

## 2022-06-30 DIAGNOSIS — F41.1 GENERALIZED ANXIETY DISORDER: ICD-10-CM

## 2022-06-30 DIAGNOSIS — F33.0 MILD EPISODE OF RECURRENT MAJOR DEPRESSIVE DISORDER: Primary | ICD-10-CM

## 2022-06-30 PROCEDURE — 90832 PSYTX W PT 30 MINUTES: CPT | Performed by: COUNSELOR

## 2022-07-11 NOTE — PROGRESS NOTES
Follow Up Therapy Office Visit      Date: 2022     Patient Name: Conchita SOTO  : 1995   Time In: 1:40  Time Out: 2:14    PCP: Hannah Mckinney APRN    Chief Complaint:     ICD-10-CM ICD-9-CM   1. Mild episode of recurrent major depressive disorder (HCC)  F33.0 296.31       History of Present Illness: Conchita SOTO is a 27 y.o. female who presents today for initial therapy session. Patient arrived for session on time, clean and casually dressed without evidence of intoxication, withdrawal, or perceptual disturbance. Patient was cooperative and agreeable to treatment and interacted with therapist.  The patient comes in the [place] office location today.  The patient states that she has been doing okay, but is very tired.  The patient discussed that she has not been having restful sleep lately.  The patient admits to having a lot of things on her mind.  The patient is considering taking the CPA test, but understands that it is very demanding and time-consuming so she is not sure that she wants to take that time to do that.  The therapist and the patient discussed the importance of making less and follow through on them.    Subjective      Review of Systems:   The following portions of the patient's history were reviewed and updated as appropriate: allergies, current medications, past family history, past medical history, past social history, past surgical history and problem list.    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 No pertinent family history.    Social History:   Social History     Socioeconomic History   • Marital status:    Tobacco Use   • Smoking status: Never Smoker   • Smokeless tobacco: Never Used    Vaping Use   • Vaping Use: Never used   Substance and Sexual Activity   • Alcohol use: Yes     Alcohol/week: 2.0 standard drinks     Types: 2 Glasses of wine per week     Comment: occasionly 1-2 drinks/week   • Drug use: Never   • Sexual activity: Yes     Partners: Male     Birth control/protection: I.U.D.       Trauma History:  None.     Spiritual:  unknown    Mental Illness and/or Substance Abuse: The patient has been diagnosed with Anxiety, and Depression.      History: No    Medication:     Current Outpatient Medications:   •  amitriptyline (ELAVIL) 10 MG tablet, Take 1 tablet by mouth Every Night., Disp: 30 tablet, Rfl: 4  •  azelastine (ASTELIN) 0.1 % nasal spray, , Disp: , Rfl:   •  busPIRone (BUSPAR) 5 MG tablet, Take 1 tablet by mouth 3 (Three) Times a Day As Needed (anxiety)., Disp: 90 tablet, Rfl: 3  •  EPINEPHrine (EPIPEN) 0.3 MG/0.3ML solution auto-injector injection, Auvi-Q 0.3 mg/0.3 mL injection, auto-injector  Take 1 auto as needed by injection route., Disp: , Rfl:   •  Gentamicin Sulfate (jose's solution for nasal irrigation), into the nostril(s) as directed by provider., Disp: , Rfl:   •  levonorgestrel (Mirena, 52 MG,) 20 MCG/24HR IUD, , Disp: , Rfl:   •  pseudoephedrine-guaifenesin (MUCINEX D)  MG per 12 hr tablet, Mucinex D 60 mg-600 mg tablet,extended release  1 po daily with a full glass of water x7 days then prn, Disp: , Rfl:     Allergies:   No Known Allergies    Educational/Work History:  Highest level of education obtained: The patient graduated from [employer] with a BBA in Accounting, and a BM in music.   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 no  None.     Patient experienced a death / loss of relationship? yes  The patients grandmother  suddenly in 2018.    Patient experienced a major accident or tragic events? no  None.  "    Patient experienced any other significant life events or trauma (such as verbal, physical, sexual abuse)? no  None.     Legal History:  The patient has no significant history of legal issues.  Court-ordered: No    PHQ-9 Score:   PHQ-9 Total Score:       MITUL 7: Total Score: 11    Objective     Physical Exam:   Height 175.3 cm (69\"), weight 110 kg (243 lb 9.6 oz). Body mass index is 35.97 kg/m².     Physical Exam    Patient's Support Network Includes:      Prognosis: Good with Ongoing Treatment     Mental Status Exam:   Hygiene:   good  Cooperation:  Cooperative  Eye Contact:  Good  Psychomotor Behavior:  Appropriate  Affect:  Full range  Mood: fluctuates  Hopelessness: Denies  Speech:  Normal  Thought Process:  Goal directed  Thought Content:  Normal  Suicidal:  None  Homicidal:  None  Hallucinations:  None  Delusion:  None  Memory:  Intact  Orientation:  Person, Place, Time and Situation  Reliability:  good  Insight:  Good  Judgement:  Good  Impulse Control:  Good  Physical/Medical Issues:  No      Assessment / Plan      Assessment/Plan:   Diagnoses and all orders for this visit:    1. Mild episode of recurrent major depressive disorder (HCC) (Primary)         1. The therapist will continue to promote the therapeutic alliance, address the patients issues and concerns, and strengthen her self awareness, insights, and positive coping skills. These positive coping skills include: visualization, music, art, breathing, exercise, and positive self talk.  The therapist to encourage the patient to look at the pros and cons of taking the CPA exam.     TREATMENT PLAN/GOALS: Continue supportive psychotherapy efforts and medications as indicated. Treatment and medication options discussed during today's visit. Patient ackowledged and verbally consented to continue with current treatment plan and was educated on the importance of compliance with treatment and follow-up appointments.     Counseled patient regarding " multimodal approach with healthy nutrition, healthy sleep, regular physical activity, social activities, counseling, and medications.      Coping skills reviewed and encouraged positive framing of thoughts. No suicidal ideation or homicidal ideation at this time.      Assisted patient in processing above session content; acknowledged and normalized patient’s thoughts, feelings, and concerns.  Applied  positive coping skills and behavior management in session.    Allowed patient to freely discuss issues without interruption or judgment. Provided safe, confidential environment to facilitate the development of positive therapeutic relationship and encourage open, honest communication. Assisted patient in identifying risk factors which would indicate the need for higher level of care including thoughts to harm self or others and/or self-harming behavior and encouraged patient to contact this office, call 911, or present to the nearest emergency room should any of these events occur. Discussed crisis intervention services and means to access.     Follow Up:   Return for Recheck.    FAIZAN Glover  This document has been electronically signed by FAIZAN Glover  2022 16:21 EDT    Please note that portions of this note were completed with a voice recognition program. Efforts were made to edit dictation, but occasionally words are mistranscribed.Telehealth Video Therapy Visit      Date: 2022     Patient Name: Conchita STOO  : 1995   Time In: 9:30  Time Out: 10:00    Disclosure: You have chosen to receive care through a video visit today. Do you consent to use the phone and/or a video visit for your behavioral health today? Yes         Chief Complaint:  Mild Episode of Recurrent Major Depressive Episode Disorder                                 F33.0                                 Anger      History of Present Illness: Conchita SOTO is a 27 y.o. female that has agreed to be seen via a  "telehealth visit today. Conchita SOTO has stated that they are in a secure environment for this session. The provider is located at [facility], [address], [address], [place], [place]. . The patient is seen remotely at her  home, using Epic Video Visit (HIPAA compliant). The patient discussed that her sleep has been getting better, and she is working hard to keep a structured routine. The patient discussed that lately she has had a case of the \"sads\", but states hat her medication has been doing ok. 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 The patient feel that this is more of a sustainable option for her. The patient states that her medication has been doing ok, and is getting prepared to have a Colonoscopy on the 29th of July. The patient is appropriate for a telemedicine visit. I identified myself Keyana Portillo [facility]  along with my credentials of [facility].@ can refuse to be seen remotely at any time. The electronic data is encrypted and password protected, and the patient has been advised of the potential risks to privacy, not withstanding such measures.    Subjective      Review of Systems:   The following portions of the patient's history were reviewed and updated as appropriate: allergies, current medications, past family history, past medical history, past social history, past surgical history and problem list.    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 No pertinent family history.    Social History:   Social History     Socioeconomic History   • " "Marital status:    Tobacco Use   • Smoking status: Never Smoker   • Smokeless tobacco: Never Used   Vaping Use   • Vaping Use: Never used   Substance and Sexual Activity   • Alcohol use: Yes     Alcohol/week: 2.0 standard drinks     Types: 2 Glasses of wine per week     Comment: occasionly 1-2 drinks/week   • Drug use: Never   • Sexual activity: Yes     Partners: Male     Birth control/protection: I.U.D.       Medications:     Current Outpatient Medications:   •  amitriptyline (ELAVIL) 10 MG tablet, Take 1 tablet by mouth Every Night., Disp: 30 tablet, Rfl: 4  •  azelastine (ASTELIN) 0.1 % nasal spray, , Disp: , Rfl:   •  busPIRone (BUSPAR) 5 MG tablet, Take 1 tablet by mouth 3 (Three) Times a Day As Needed (anxiety)., Disp: 90 tablet, Rfl: 3  •  EPINEPHrine (EPIPEN) 0.3 MG/0.3ML solution auto-injector injection, Auvi-Q 0.3 mg/0.3 mL injection, auto-injector  Take 1 auto as needed by injection route., Disp: , Rfl:   •  Gentamicin Sulfate (jose's solution for nasal irrigation), into the nostril(s) as directed by provider., Disp: , Rfl:   •  levonorgestrel (Mirena, 52 MG,) 20 MCG/24HR IUD, , Disp: , Rfl:   •  pseudoephedrine-guaifenesin (MUCINEX D)  MG per 12 hr tablet, Mucinex D 60 mg-600 mg tablet,extended release  1 po daily with a full glass of water x7 days then prn, Disp: , Rfl:     Allergies:   No Known Allergies    PHQ-9 Score:   PHQ-9 Total Score: 14     Objective     Physical Exam:   Height 175.3 cm (69\"), weight 110 kg (243 lb 9.6 oz). Body mass index is 35.97 kg/m².     Physical Exam    Patient's Support Network Includes:      Prognosis: Good with Ongoing Treatment     Mental Status Exam:   Hygiene:   good  Cooperation:  Cooperative  Eye Contact:  Good  Psychomotor Behavior:  Appropriate  Affect:  Appropriate  Mood: normal  Hopelessness: Denies  Speech:  Normal  Thought Process:  Goal directed  Thought Content:  Normal  Suicidal:  None  Homicidal:  None  Hallucinations:  " None  Delusion:  None  Memory:  Intact  Orientation:  Person, Place, Time and Situation  Reliability:  good  Insight:  Good  Judgement:  Good  Impulse Control:  Good  Physical/Medical Issues:  No      Assessment / Plan      Assessment/Plan:   Diagnoses and all orders for this visit:    1. Mild episode of recurrent major depressive disorder (HCC) (Primary)         2. The therapist will continue to promote the therapeutic alliance, address the patient's issues and concerns, and strengthen her self-awareness, self-esteem, insights, and positive coping skills.  These positive coping skills include visualization, music, breathing, exercising, and positive self talk.    TREATMENT PLAN/GOALS: Continue supportive psychotherapy efforts and medications as indicated. Treatment and medication options discussed during today's visit. Patient ackowledged and verbally consented to continue with current treatment plan and was educated on the importance of compliance with treatment and follow-up appointments.     Counseled patient regarding multimodal approach with healthy nutrition, healthy sleep, regular physical activity, social activities, counseling, and medications.      Coping skills reviewed and encouraged positive framing of thoughts. No suicidal ideation or homicidal ideation at this time.      Assisted patient in processing above session content; acknowledged and normalized patient’s thoughts, feelings, and concerns.  Applied  positive coping skills and behavior management in session.    Allowed patient to freely discuss issues without interruption or judgment. Provided safe, confidential environment to facilitate the development of positive therapeutic relationship and encourage open, honest communication. Assisted patient in identifying risk factors which would indicate the need for higher level of care including thoughts to harm self or others and/or self-harming behavior and encouraged patient to contact this office,  call 911, or present to the nearest emergency room should any of these events occur. Discussed crisis intervention services and means to access.     Follow Up:   Return for Recheck.    FAIZAN Glover  This document has been electronically signed by FAIZAN Glover  July 11, 2022 16:21 EDT    Please note that portions of this note were completed with a voice recognition program. Efforts were made to edit dictation, but occasionally words are mistranscribed.

## 2022-07-12 ENCOUNTER — OFFICE VISIT (OUTPATIENT)
Dept: BEHAVIORAL HEALTH | Facility: CLINIC | Age: 27
End: 2022-07-12

## 2022-07-12 VITALS — HEIGHT: 69 IN | WEIGHT: 243.2 LBS | BODY MASS INDEX: 36.02 KG/M2

## 2022-07-12 DIAGNOSIS — F33.0 MILD EPISODE OF RECURRENT MAJOR DEPRESSIVE DISORDER: Primary | ICD-10-CM

## 2022-07-12 PROCEDURE — 90832 PSYTX W PT 30 MINUTES: CPT | Performed by: COUNSELOR

## 2022-07-13 NOTE — PROGRESS NOTES
Follow Therapy Office Visit      Date: 2022     Patient Name: Conchita SOTO  : 1995   Time In: 4:30  Time Out: 5:05    PCP: Hannah Mckinney APRN    Chief Complaint:     ICD-10-CM ICD-9-CM   1. Mild episode of recurrent major depressive disorder (HCC)  F33.0 296.31       History of Present Illness: Conchita SOTO is a 27 y.o. female who presents today for initial therapy session. Patient arrived for session on time, clean and casually dressed without evidence of intoxication, withdrawal, or perceptual disturbance. Patient was cooperative and agreeable to treatment and interacted with therapist. The patient was seen at the [place] office today. The patient states work has been very busy. The patient states that she had a good 4th, but her father got hit with a firework in his eye, and her mom ended up taking him to [facility] for his eye. He is ok, but he might lose sight in that eye. The patient discussed that she has not been worried as much regarding the state of the world, and has decided to learn how to can from her mother in law. The patient states that she has been wearing the rubberband around her wrist, and now does not worry as much as she used to. 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        Subjective      Review of Systems:   The following portions of the patient's history were reviewed and updated as appropriate: allergies, current medications, past family history, past medical history, past social history, past surgical history and problem list.    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    Social History:   Social History      Socioeconomic History   • Marital status:    Tobacco Use   • Smoking status: Never Smoker   • Smokeless tobacco: Never Used   Vaping Use   • Vaping Use: Never used   Substance and Sexual Activity   • Alcohol use: Yes     Alcohol/week: 2.0 standard drinks     Types: 2 Glasses of wine per week     Comment: occasionly 1-2 drinks/week   • Drug use: Never   • Sexual activity: Yes     Partners: Male     Birth control/protection: I.U.D.       Trauma History:  None.     Spiritual:  unknown    Mental Illness and/or Substance Abuse: The patient has been diagnosed with Anxiety, and Depression.      History: No    Medication:     Current Outpatient Medications:   •  amitriptyline (ELAVIL) 10 MG tablet, Take 1 tablet by mouth Every Night., Disp: 30 tablet, Rfl: 4  •  azelastine (ASTELIN) 0.1 % nasal spray, , Disp: , Rfl:   •  busPIRone (BUSPAR) 5 MG tablet, Take 1 tablet by mouth 3 (Three) Times a Day As Needed (anxiety)., Disp: 90 tablet, Rfl: 3  •  EPINEPHrine (EPIPEN) 0.3 MG/0.3ML solution auto-injector injection, Auvi-Q 0.3 mg/0.3 mL injection, auto-injector  Take 1 auto as needed by injection route., Disp: , Rfl:   •  Gentamicin Sulfate (jose's solution for nasal irrigation), into the nostril(s) as directed by provider., Disp: , Rfl:   •  levonorgestrel (Mirena, 52 MG,) 20 MCG/24HR IUD, , Disp: , Rfl:   •  pseudoephedrine-guaifenesin (MUCINEX D)  MG per 12 hr tablet, Mucinex D 60 mg-600 mg tablet,extended release  1 po daily with a full glass of water x7 days then prn, Disp: , Rfl:     Allergies:   No Known Allergies    Educational/Work History:  Highest level of education obtained: The patient graduated from [employer] with a BBA in Accounting, and a BM in music.   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     Significant Life Events:   Patient been through or witnessed a divorce? no  None.     Patient experienced a death / loss  "of relationship? yes  The patients grandmother  suddenly in 2018.    Patient experienced a major accident or tragic events? no  None.     Patient experienced any other significant life events or trauma (such as verbal, physical, sexual abuse)? no  None.     Legal History:  The patient has no significant history of legal issues.  Court-ordered: No    PHQ-9 Score:   PHQ-9 Total Score:       MITUL 7: Total Score: 9    Objective     Physical Exam:   Height 175.3 cm (69\"), weight 110 kg (243 lb 3.2 oz). Body mass index is 35.91 kg/m².     Physical Exam    Patient's Support Network Includes:      Prognosis: Good with Ongoing Treatment     Mental Status Exam:   Hygiene:   good  Cooperation:  Cooperative  Eye Contact:  Good  Psychomotor Behavior:  Appropriate  Affect:  Appropriate  Mood: normal  Hopelessness: Denies  Speech:  Normal  Thought Process:  Goal directed  Thought Content:  Normal  Suicidal:  None  Homicidal:  None  Hallucinations:  None  Delusion:  None  Memory:  Intact  Orientation:  Person, Place, Time and Situation  Reliability:  good  Insight:  Good  Judgement:  Good  Impulse Control:  Good  Physical/Medical Issues:  No      Assessment / Plan      Assessment/Plan:   Diagnoses and all orders for this visit:    1. Mild episode of recurrent major depressive disorder (HCC) (Primary)         1. The therapist will continue to promote the therapeutic alliance, address the patients issues and concerns, and strengthen her self awareness, insights, and positive coping skills. These positive coping skills include: visualization, music, art, breathing, exercise, and positive self talk.  Help the patient focus on modifying her negative thoughts, behaviors, and emotional responses associated with the psychological distress that she might be feeling in regards to the state of the world.    TREATMENT PLAN/GOALS: Continue supportive psychotherapy efforts and medications as indicated. Treatment and medication options " discussed during today's visit. Patient ackowledged and verbally consented to continue with current treatment plan and was educated on the importance of compliance with treatment and follow-up appointments.     Counseled patient regarding multimodal approach with healthy nutrition, healthy sleep, regular physical activity, social activities, counseling, and medications.      Coping skills reviewed and encouraged positive framing of thoughts. No suicidal ideation or homicidal ideation at this time.      Assisted patient in processing above session content; acknowledged and normalized patient’s thoughts, feelings, and concerns.  Applied  positive coping skills and behavior management in session.    Allowed patient to freely discuss issues without interruption or judgment. Provided safe, confidential environment to facilitate the development of positive therapeutic relationship and encourage open, honest communication. Assisted patient in identifying risk factors which would indicate the need for higher level of care including thoughts to harm self or others and/or self-harming behavior and encouraged patient to contact this office, call 911, or present to the nearest emergency room should any of these events occur. Discussed crisis intervention services and means to access.     Follow Up:   No follow-ups on file.    FAIZAN Glover  This document has been electronically signed by FAIZAN Glover  July 13, 2022 09:20 EDT    Please note that portions of this note were completed with a voice recognition program. Efforts were made to edit dictation, but occasionally words are mistranscribed.

## 2022-07-26 ENCOUNTER — OFFICE VISIT (OUTPATIENT)
Dept: BEHAVIORAL HEALTH | Facility: CLINIC | Age: 27
End: 2022-07-26

## 2022-07-26 VITALS
BODY MASS INDEX: 35.7 KG/M2 | HEIGHT: 69 IN | WEIGHT: 241 LBS | DIASTOLIC BLOOD PRESSURE: 70 MMHG | SYSTOLIC BLOOD PRESSURE: 120 MMHG

## 2022-07-26 DIAGNOSIS — F33.0 MILD EPISODE OF RECURRENT MAJOR DEPRESSIVE DISORDER: Primary | ICD-10-CM

## 2022-07-26 PROCEDURE — 90832 PSYTX W PT 30 MINUTES: CPT | Performed by: COUNSELOR

## 2022-08-02 ENCOUNTER — OFFICE VISIT (OUTPATIENT)
Dept: BEHAVIORAL HEALTH | Facility: CLINIC | Age: 27
End: 2022-08-02

## 2022-08-02 VITALS
HEIGHT: 70 IN | WEIGHT: 239.8 LBS | SYSTOLIC BLOOD PRESSURE: 130 MMHG | BODY MASS INDEX: 34.33 KG/M2 | DIASTOLIC BLOOD PRESSURE: 74 MMHG

## 2022-08-02 DIAGNOSIS — F90.0 ATTENTION DEFICIT HYPERACTIVITY DISORDER (ADHD), INATTENTIVE TYPE, MILD: Primary | ICD-10-CM

## 2022-08-02 DIAGNOSIS — F41.1 GENERALIZED ANXIETY DISORDER: ICD-10-CM

## 2022-08-02 PROCEDURE — 99213 OFFICE O/P EST LOW 20 MIN: CPT

## 2022-08-02 NOTE — PROGRESS NOTES
New Patient Office Visit    Patient Name: Conchita SOTO  : 1995   MRN: [id number]   Care Team: Patient Care Team:  Hannah Mckinney APRN as PCP - General (Family Medicine)  Keyana Portillo [facility] as Counselor (Behavioral Health)        Chief Complaint:    Chief Complaint   Patient presents with   • ADHD   • Anxiety   • Med Management       History of Present Illness: Conchita SOTO is a 27 y.o. female who is here today to establish care. Patient was referred by therapist.  Patient states that she has difficulty focusing and completing tasks. Patient states that she tends to jump from one task to another consistently. She eventually will compete her tasks but normally it is after jumping back and forth between different tasks numerous times. Patient tends to get irritable with her anxiety and ADHD symptoms. Patient's mind often wanders. Patient endorses being fidgety and having a difficulty time sitting still. ADHD questionnaire completed.      Subjective   Review of Systems:    Review of Systems   Psychiatric/Behavioral: Positive for decreased concentration and sleep disturbance. The patient is nervous/anxious.    All other systems reviewed and are negative.    PSYCHIATRIC HISTORY   Current Psychiatric Medications:  Elavil (anxiety, sleep, migraines)  Buspar PRN   Prior Psychiatric Medications:  No  Currently in Counseling or Therapy:  Yes, Keyana Portillo  Prior Psychiatric Outpatient Care:  PCP  Prior Psychiatric Hospitalizations:  No   Previous Suicide Attempts:  No   Previous Self-Harming Behavior:  No   History of Seizures or TBI:  No   Abuse History:  Verbal: no  Emotional: no  Mental: no  Physical: no  Sexual: no  Rape: no  Other: Denies      Family Psychiatric History:  Sister: social anxiety   Any family history of suicide attempts:  No       Substance Abuse History:  Alcohol: social   Smoking/Cigarettes: no  Vaping: no  Smokeless Tobacco: no  Illicit Substances: no  Prescription Medication Misuse:  no    Social History:  Currently resides in [place], [place]    Marriage status:   Children: no  Lives with: The patient's currently household consists of the patient and    Highest Level of Education:Bachelors Degree Business Administration   Employment:   Legal History, Arrests, or Incarcerations: No current legal charges pending.  Patient's Support Network Includes:   and parents  Last Menstrual Period: IUD     Current Medications:   Current Outpatient Medications   Medication Sig Dispense Refill   • amitriptyline (ELAVIL) 10 MG tablet Take 1 tablet by mouth Every Night. 30 tablet 4   • azelastine (ASTELIN) 0.1 % nasal spray      • busPIRone (BUSPAR) 5 MG tablet Take 1 tablet by mouth 3 (Three) Times a Day As Needed (anxiety). 90 tablet 3   • EPINEPHrine (EPIPEN) 0.3 MG/0.3ML solution auto-injector injection Auvi-Q 0.3 mg/0.3 mL injection, auto-injector   Take 1 auto as needed by injection route.     • Gentamicin Sulfate (jose's solution for nasal irrigation) into the nostril(s) as directed by provider.     • levonorgestrel (Mirena, 52 MG,) 20 MCG/24HR IUD      • pseudoephedrine-guaifenesin (MUCINEX D)  MG per 12 hr tablet Mucinex D 60 mg-600 mg tablet,extended release   1 po daily with a full glass of water x7 days then prn     • lisdexamfetamine (Vyvanse) 20 MG capsule Take 1 capsule by mouth Every Morning 30 capsule 0     No current facility-administered medications for this visit.       Mental Status Exam:   Hygiene:   good  Cooperation:  Cooperative  Eye Contact:  Good  Psychomotor Behavior:  Appropriate  Affect:  Appropriate  Mood: normal  Speech:  Normal  Thought Process:  Goal directed and Linear  Thought Content:  Normal  Suicidal:  None  Homicidal:  None  Hallucinations:  None  Delusion:  None  Memory:  Intact  Orientation:  Person, Place, Time and Situation  Reliability:  good  Insight:  Good  Judgement:  Good  Impulse Control:  Good  Physical/Medical Issues:  No   "    Objective   Vital Signs:   /74   Ht 177.8 cm (70\")   Wt 109 kg (239 lb 12.8 oz)   BMI 34.41 kg/m²       Assessment / Plan    Diagnoses and all orders for this visit:    1. Attention deficit hyperactivity disorder (ADHD), inattentive type, mild (Primary)  -     lisdexamfetamine (Vyvanse) 20 MG capsule; Take 1 capsule by mouth Every Morning  Dispense: 30 capsule; Refill: 0  -     Compliance Drug Analysis, Ur - Urine, Clean Catch    2. Generalized anxiety disorder  -     lisdexamfetamine (Vyvanse) 20 MG capsule; Take 1 capsule by mouth Every Morning  Dispense: 30 capsule; Refill: 0       Will start Vyvanse 20mg daily. Obtained urine drug screen and controlled substance agreement signed.     A psychological evaluation was conducted in order to assess past and current level of functioning. Areas assessed included, but were not limited to: perception of social support, perception of ability to face and deal with challenges in life (positive functioning), anxiety symptoms, depressive symptoms, perspective on beliefs/belief system, coping skills for stress, intelligence level,  Therapeutic rapport was established. Interventions conducted today were geared towards incorporating medication management along with support for continued therapy. Education was also provided as to the med management with this provider and what to expect in subsequent sessions.      We discussed risks, benefits, goals and side effects of the above medication and the patient was agreeable with the plan. Patient was educated on the importance of compliance with treatment and follow-up appointments. Patient is aware to contact the [facility] with any worsening of symptoms. To call for questions or concerns and return early if necessary. Patent is agreeable to go to the Emergency Department or call 911 should they begin SI/HI.    PHQ-2/PHQ-9: Depression Screening  Little Interest or Pleasure in Doing Things: 1-->several days  Feeling " Down, Depressed or Hopeless: 1-->several days  PHQ-2 Total Score: 2  Trouble Falling or Staying Asleep, or Sleeping Too Much: 3-->nearly every day  Feeling Tired or Having Little Energy: 2-->more than half the days  Poor Appetite or Overeatin-->several days  Feeling Bad about Yourself - or that You are a Failure or Have Let Yourself or Your Family Down: 0-->not at all  Trouble Concentrating on Things, Such as Reading the Newspaper or Watching Television: 2-->more than half the days  Moving or Speaking So Slowly that Other People Could Have Noticed? Or the Opposite - Being So Fidgety: 0-->not at all  Thoughts that You Would be Better Off Dead or of Hurting Yourself in Some Way: 0-->not at all  PHQ-9: Brief Depression Severity Measure Score: 10  If You Checked Off Any Problems, How Difficult Have These Problems Made It For You to Do Your Work, Take Care of Things at Home, or Get Along with Other People?: somewhat difficult    PHQ-9 Score:   PHQ-9 Total Score: 10    Depression Screening:  Patient screened positive for depression based on a PHQ-9 score of 10 on 2022. Follow-up recommendations include: Suicide Risk Assessment performed.      MITUL-7 Score:  Feeling nervous, anxious or on edge: Several days  Not being able to stop or control worrying: Several days  Worrying too much about different things: More than half the days  Trouble Relaxing: More than half the days  Being so restless that it is hard to sit still: Several days  Feeling afraid as if something awful might happen: More than half the days  Becoming easily annoyed or irritable: Nearly every day  MITUL 7 Total Score: 12  If you checked any problems, how difficult have these problems made it for you to do your work, take care of things at home, or get along with other people: Somewhat difficult       Screening for Adults With ADHD - (1-6)  1. How often do you have trouble wrapping up the final details of a project, once the challenging parts have been  done?: Rarely  2. How often do you have difficulty getting things in order when you have to do a task that requires organization?: Rarely  3. How often do you have problems remembering appointments or obligations : Rarely  4. When you have a task that requires a lot of thought, how often do you avoid or delay getting started ?: Very Often  5. How often do you fidget or squirm with your hands or feet when you have to sit down for a long time?: Very Often  6. How often do you feel overly active and compelled to do things, like you were driven by a motor?: Rarely  7. How often do you make careless mistakes when you have to work on a boring or difficult project?: Very Often  8. How often do have difficulty keeping your attention when you are doing boring or repetitive work?: Often  9. How often do you have difficulty concentrating on what people say to you, even when they are speaking to you: Often  10.How often do you misplace or have difficulty finding things at home or at work?: Rarely  11.How often are you distracted by activity or noise around you?: Very Often  12.How often do you leave your seat in meetings or other situations in which you are expected to remain seated?: Never  13.How often do you feel restless or fidgety?: Never  14.How often do you have difficulty unwinding and relaxing when you have time to yourself?: Never  15.How often do you find yourself talking too much when you are in social situations?: Rarely  16.When you’re in a conversation, how often do you find yourself finishing the sentences of the people you are talking to, before they can finish them themselves?: Never  17.How often do you have difficulty waiting your turn in situations when turn taking is required?: Never  18.How often do you interrupt others when they are busy?: Sometimes      MEDS ORDERED DURING VISIT:  New Medications Ordered This Visit   Medications   • lisdexamfetamine (Vyvanse) 20 MG capsule     Sig: Take 1 capsule by  mouth Every Morning     Dispense:  30 capsule     Refill:  0         Follow Up   Return in about 4 weeks (around 8/30/2022).  Patient was given instructions and counseling regarding her condition or for health maintenance advice. Please see specific information pulled into the AVS if appropriate.     TREATMENT PLAN/GOALS: Continue supportive psychotherapy efforts and medications as indicated. Treatment and medication options discussed during today's visit. Patient acknowledged and verbally consented to continue with current treatment plan and was educated on the importance of compliance with treatment and follow-up appointments.    MEDICATION ISSUES:  Discussed medication options and treatment plan of prescribed medication as well as the risks, benefits, and side effects including potential falls, possible impaired driving and metabolic adversities among others. Patient is agreeable to call the office with any worsening of symptoms or onset of side effects. Patient is agreeable to call 911 or go to the nearest ER should he/she begin having SI/HI.      [facility]  [address] [place] [id number] [phone]     August 2, 2022 14:54 EDT

## 2022-08-08 LAB — DRUGS UR: NORMAL

## 2022-08-08 NOTE — PROGRESS NOTES
"          Follow Up Therapy Office Visit      Date: 2022     Patient Name: Conchita SOTO  : 1995   Time In: 4:45  Time Out: 5:20    PCP: Hannah Mckinney APRN    Chief Complaint:     ICD-10-CM ICD-9-CM   1. Mild episode of recurrent major depressive disorder (HCC)  F33.0 296.31   2. Generalized anxiety disorder  F41.1 300.02       History of Present Illness: Conchita SOTO is a 27 y.o. female who presents today for a follow up therapy session. Patient arrived for session on time, clean and casually dressed without evidence of intoxication, withdrawal, or perceptual disturbance. Patient was cooperative and agreeable to treatment and interacted with therapist.  The patient comes in the [place] office location today.  The patient has been doing okay, but she still has some anxiety regarding the possibility of \"impending doom\" of the world, she is working on stalking up her food, and working on saving up funds. The patient discussed how she is taking a break from social media.  The patient realizes that social media is causing a lot of her anxiety.  The patient discussed how her and her  have been frustrated with one another lately.  The therapist and the patient discussed ways to work on communication with each other.    Subjective      Review of Systems:   The following portions of the patient's history were reviewed and updated as appropriate: allergies, current medications, past family history, past medical history, past social history, past surgical history and problem list.    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    Social History:   Social History     Socioeconomic History   • Marital status:  "   Tobacco Use   • Smoking status: Never Smoker   • Smokeless tobacco: Never Used   Vaping Use   • Vaping Use: Never used   Substance and Sexual Activity   • Alcohol use: Yes     Alcohol/week: 2.0 standard drinks     Types: 2 Glasses of wine per week     Comment: occasionly 1-2 drinks/week   • Drug use: Never   • Sexual activity: Yes     Partners: Male     Birth control/protection: I.U.D.       Trauma History:  None.     Spiritual:  unknown    Mental Illness and/or Substance Abuse: The patient has been diagnosed with Anxiety, and Depression.      History: No    Medication:     Current Outpatient Medications:   •  amitriptyline (ELAVIL) 10 MG tablet, Take 1 tablet by mouth Every Night., Disp: 30 tablet, Rfl: 4  •  azelastine (ASTELIN) 0.1 % nasal spray, , Disp: , Rfl:   •  busPIRone (BUSPAR) 5 MG tablet, Take 1 tablet by mouth 3 (Three) Times a Day As Needed (anxiety)., Disp: 90 tablet, Rfl: 3  •  EPINEPHrine (EPIPEN) 0.3 MG/0.3ML solution auto-injector injection, Auvi-Q 0.3 mg/0.3 mL injection, auto-injector  Take 1 auto as needed by injection route., Disp: , Rfl:   •  Gentamicin Sulfate (jose's solution for nasal irrigation), into the nostril(s) as directed by provider., Disp: , Rfl:   •  levonorgestrel (Mirena, 52 MG,) 20 MCG/24HR IUD, , Disp: , Rfl:   •  lisdexamfetamine (Vyvanse) 20 MG capsule, Take 1 capsule by mouth Every Morning, Disp: 30 capsule, Rfl: 0  •  pseudoephedrine-guaifenesin (MUCINEX D)  MG per 12 hr tablet, Mucinex D 60 mg-600 mg tablet,extended release  1 po daily with a full glass of water x7 days then prn, Disp: , Rfl:     Allergies:   No Known Allergies    Educational/Work History:  Highest level of education obtained: The patient graduated from [employer] with a BBA in Accounting, and a BM in music.   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  no  None.     Patient experienced a death / loss of relationship? yes  The patients grandmother  suddenly in 2018.    Patient experienced a major accident or tragic events? no  None.     Patient experienced any other significant life events or trauma (such as verbal, physical, sexual abuse)? no  None.     Legal History:  The patient has no significant history of legal issues.  Court-ordered: No    PHQ-9 Score:   PHQ-9 Total Score: 9    MITUL 7: Total Score: 8    Objective     Physical Exam:   There were no vitals taken for this visit. There is no height or weight on file to calculate BMI.     Physical Exam    Patient's Support Network Includes:      Prognosis: Good with Ongoing Treatment     Mental Status Exam:   Hygiene:   good  Cooperation:  Cooperative  Eye Contact:  Good  Psychomotor Behavior:  Appropriate  Affect:  appropriate  Mood: normal  Hopelessness: Denies  Speech:  Normal  Thought Process:  Goal directed  Thought Content:  Normal  Suicidal:  None  Homicidal:  None  Hallucinations:  None  Delusion:  None  Memory:  Intact  Orientation:  Person, Place, Time and Situation  Reliability:  good  Insight:  Good  Judgement:  Good  Impulse Control:  Good  Physical/Medical Issues:  No      Assessment / Plan      Assessment/Plan:   Diagnoses and all orders for this visit:    1. Mild episode of recurrent major depressive disorder (HCC) (Primary)    2. Generalized anxiety disorder         1. The therapist will continue to promote the therapeutic alliance, address the patients issues and concerns, and strengthen her self awareness, insights, and positive coping skills. These positive coping skills include: visualization, music, art, breathing, exercise, and positive self talk.  The therapist to encourage the patient and her  to communicate more in the relationship.    TREATMENT PLAN/GOALS: Continue supportive psychotherapy efforts and medications as indicated. Treatment and medication options discussed  during today's visit. Patient ackowledged and verbally consented to continue with current treatment plan and was educated on the importance of compliance with treatment and follow-up appointments.     Counseled patient regarding multimodal approach with healthy nutrition, healthy sleep, regular physical activity, social activities, counseling, and medications.      Coping skills reviewed and encouraged positive framing of thoughts. No suicidal ideation or homicidal ideation at this time.      Assisted patient in processing above session content; acknowledged and normalized patient’s thoughts, feelings, and concerns.  Applied  positive coping skills and behavior management in session.    Allowed patient to freely discuss issues without interruption or judgment. Provided safe, confidential environment to facilitate the development of positive therapeutic relationship and encourage open, honest communication. Assisted patient in identifying risk factors which would indicate the need for higher level of care including thoughts to harm self or others and/or self-harming behavior and encouraged patient to contact this office, call 911, or present to the nearest emergency room should any of these events occur. Discussed crisis intervention services and means to access.     Follow Up:   Return for Recheck.    FAIZAN Glover  This document has been electronically signed by FAIZAN Glover  2022 14:36 EDT    Please note that portions of this note were completed with a voice recognition program. Efforts were made to edit dictation, but occasionally words are mistranscribed.Telehealth Video Therapy Visit      Date: 2022     Patient Name: Conchita SOTO  : 1995   Time In: 9:30  Time Out: 10:00    Disclosure: You have chosen to receive care through a video visit today. Do you consent to use the phone and/or a video visit for your behavioral health today? Yes         Chief Complaint:  Mild Episode of  "Recurrent Major Depressive Episode Disorder                                 F33.0                                 Anger      History of Present Illness: Conchita SOTO is a 27 y.o. female that has agreed to be seen via a telehealth visit today. Conchita SOTO has stated that they are in a secure environment for this session. The provider is located at [facility], [address] [place] [address], [address], [place], [place]. . The patient is seen remotely at her  home, using Epic Video Visit (HIPAA compliant). The patient discussed that her sleep has been getting better, and she is working hard to keep a structured routine. The patient discussed that lately she has had a case of the \"sads\", but states hat her medication has been doing ok. 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 The patient feel that this is more of a sustainable option for her. The patient states that her medication has been doing ok, and is getting prepared to have a Colonoscopy on the 29th of July. The patient is appropriate for a telemedicine visit. I identified myself Keyana Portillo, [facility]  along with my credentials of [facility].@ can refuse to be seen remotely at any time. The electronic data is encrypted and password protected, and the patient has been advised of the potential risks to privacy, not withstanding such measures.    Subjective      Review of Systems:   The following portions of the patient's history were reviewed and updated as appropriate: allergies, current medications, past family history, past medical history, past social history, past surgical history and problem list.    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    Social History:   Social History     Socioeconomic History   • Marital status:    Tobacco Use   • Smoking status: Never Smoker   • Smokeless tobacco: Never Used   Vaping Use   • Vaping Use: Never used   Substance and Sexual Activity   • Alcohol use: Yes     Alcohol/week: 2.0 standard drinks     Types: 2 Glasses of wine per week     Comment: occasionly 1-2 drinks/week   • Drug use: Never   • Sexual activity: Yes     Partners: Male     Birth control/protection: I.U.D.       Medications:     Current Outpatient Medications:   •  amitriptyline (ELAVIL) 10 MG tablet, Take 1 tablet by mouth Every Night., Disp: 30 tablet, Rfl: 4  •  azelastine (ASTELIN) 0.1 % nasal spray, , Disp: , Rfl:   •  busPIRone (BUSPAR) 5 MG tablet, Take 1 tablet by mouth 3 (Three) Times a Day As Needed (anxiety)., Disp: 90 tablet, Rfl: 3  •  EPINEPHrine (EPIPEN) 0.3 MG/0.3ML solution auto-injector injection, Auvi-Q 0.3 mg/0.3 mL injection, auto-injector  Take 1 auto as needed by injection route., Disp: , Rfl:   •  Gentamicin Sulfate (jose's solution for nasal irrigation), into the nostril(s) as directed by provider., Disp: , Rfl:   •  levonorgestrel (Mirena, 52 MG,) 20 MCG/24HR IUD, , Disp: , Rfl:   •  lisdexamfetamine (Vyvanse) 20 MG capsule, Take 1 capsule by mouth Every Morning, Disp: 30 capsule, Rfl: 0  •  pseudoephedrine-guaifenesin (MUCINEX D)  MG per 12 hr tablet, Mucinex D 60 mg-600 mg tablet,extended release  1 po daily with a full glass of water x7 days then prn, Disp: , Rfl:     Allergies:   No Known Allergies    PHQ-9 Score:   PHQ-9 Total Score: 11     Objective     Physical Exam:   There were no vitals taken for this visit. There is no height or weight on file to calculate BMI.     Physical Exam    Patient's Support Network Includes:      Prognosis: Good with Ongoing Treatment      Mental Status Exam:   Hygiene:   good  Cooperation:  Cooperative  Eye Contact:  Good  Psychomotor Behavior:  Appropriate  Affect:  Appropriate  Mood: normal  Hopelessness: Denies  Speech:  Normal  Thought Process:  Goal directed  Thought Content:  Normal  Suicidal:  None  Homicidal:  None  Hallucinations:  None  Delusion:  None  Memory:  Intact  Orientation:  Person, Place, Time and Situation  Reliability:  good  Insight:  Good  Judgement:  Good  Impulse Control:  Good  Physical/Medical Issues:  No      Assessment / Plan      Assessment/Plan:   Diagnoses and all orders for this visit:    1. Mild episode of recurrent major depressive disorder (HCC) (Primary)    2. Generalized anxiety disorder         2. The therapist will continue to promote the therapeutic alliance, address the patient's issues and concerns, and strengthen her self-awareness, self-esteem, insights, and positive coping skills.  These positive coping skills include visualization, music, breathing, exercising, and positive self talk.    TREATMENT PLAN/GOALS: Continue supportive psychotherapy efforts and medications as indicated. Treatment and medication options discussed during today's visit. Patient ackowledged and verbally consented to continue with current treatment plan and was educated on the importance of compliance with treatment and follow-up appointments.     Counseled patient regarding multimodal approach with healthy nutrition, healthy sleep, regular physical activity, social activities, counseling, and medications.      Coping skills reviewed and encouraged positive framing of thoughts. No suicidal ideation or homicidal ideation at this time.      Assisted patient in processing above session content; acknowledged and normalized patient’s thoughts, feelings, and concerns.  Applied  positive coping skills and behavior management in session.    Allowed patient to freely discuss issues without interruption or judgment. Provided safe,  confidential environment to facilitate the development of positive therapeutic relationship and encourage open, honest communication. Assisted patient in identifying risk factors which would indicate the need for higher level of care including thoughts to harm self or others and/or self-harming behavior and encouraged patient to contact this office, call 911, or present to the nearest emergency room should any of these events occur. Discussed crisis intervention services and means to access.     Follow Up:   Return for Recheck.    FAIZAN Glover  This document has been electronically signed by FAIZAN Glover  August 8, 2022 14:36 EDT    Please note that portions of this note were completed with a voice recognition program. Efforts were made to edit dictation, but occasionally words are mistranscribed.

## 2022-08-09 ENCOUNTER — OFFICE VISIT (OUTPATIENT)
Dept: BEHAVIORAL HEALTH | Facility: CLINIC | Age: 27
End: 2022-08-09

## 2022-08-09 VITALS — BODY MASS INDEX: 34.58 KG/M2 | WEIGHT: 241 LBS

## 2022-08-09 DIAGNOSIS — F90.0 ATTENTION DEFICIT HYPERACTIVITY DISORDER (ADHD), INATTENTIVE TYPE, MILD: Primary | ICD-10-CM

## 2022-08-09 PROCEDURE — 90832 PSYTX W PT 30 MINUTES: CPT | Performed by: COUNSELOR

## 2022-08-09 NOTE — PROGRESS NOTES
Follow Therapy Office Visit      Date: 2022     Patient Name: Conchita SOTO  : 1995   Time In: 4:30  Time Out: 4:53    PCP: Hannah Mckinney APRN    Chief Complaint:     ICD-10-CM ICD-9-CM   1. Attention deficit hyperactivity disorder (ADHD), inattentive type, mild  F90.0 314.01       History of Present Illness: Conchita SOTO is a 27 y.o. female who presents today for initial therapy session. Patient arrived for session on time, clean and casually dressed without evidence of intoxication, withdrawal, or perceptual disturbance. Patient was cooperative and agreeable to treatment and interacted with therapist. The patient was seen at the [facility] today. The patient states that her and her coworker are going to start studying in September. The patient discussed that the month of August is to try and get everything caught up at work. The patient stated that she was put on medication from our ARNP, and it is going well. The Vyvanse is helping with her motivation, but she has not noticed it helping her ADHD/focus/concentration symptoms. The patient discussed that she will be seeing Gladis again soon. The patient discussed that things have been going fine with her , but he is getting frustrated that he feels that he has been doing everything regarding cooking. The therapist suggested that they make alternating weeks of cooking, cleaning etc., so they don't feel that only one person is doing everything.     Subjective      Review of Systems:   The following portions of the patient's history were reviewed and updated as appropriate: allergies, current medications, past family history, past medical history, past social history, past surgical history and problem list.    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 No pertinent family history.    Social History:   Social History     Socioeconomic History   • Marital status:    Tobacco Use   • Smoking status: Never Smoker   • Smokeless tobacco: Never Used   Vaping Use   • Vaping Use: Never used   Substance and Sexual Activity   • Alcohol use: Yes     Alcohol/week: 2.0 standard drinks     Types: 2 Glasses of wine per week     Comment: occasionly 1-2 drinks/week   • Drug use: Never   • Sexual activity: Yes     Partners: Male     Birth control/protection: I.U.D.       Trauma History:  None.     Spiritual:  unknown    Mental Illness and/or Substance Abuse: The patient has been diagnosed with Anxiety, and Depression.      History: No    Medication:     Current Outpatient Medications:   •  amitriptyline (ELAVIL) 10 MG tablet, Take 1 tablet by mouth Every Night., Disp: 30 tablet, Rfl: 4  •  azelastine (ASTELIN) 0.1 % nasal spray, , Disp: , Rfl:   •  busPIRone (BUSPAR) 5 MG tablet, Take 1 tablet by mouth 3 (Three) Times a Day As Needed (anxiety)., Disp: 90 tablet, Rfl: 3  •  EPINEPHrine (EPIPEN) 0.3 MG/0.3ML solution auto-injector injection, Auvi-Q 0.3 mg/0.3 mL injection, auto-injector  Take 1 auto as needed by injection route., Disp: , Rfl:   •  Gentamicin Sulfate (jose's solution for nasal irrigation), into the nostril(s) as directed by provider., Disp: , Rfl:   •  levonorgestrel (Mirena, 52 MG,) 20 MCG/24HR IUD, , Disp: , Rfl:   •  lisdexamfetamine (Vyvanse) 20 MG capsule, Take 1 capsule by mouth Every Morning, Disp: 30 capsule, Rfl: 0  •  pseudoephedrine-guaifenesin (MUCINEX D)  MG per 12 hr tablet, Mucinex D 60 mg-600 mg tablet,extended release  1 po daily with a full glass of water x7 days then prn, Disp: , Rfl:     Allergies:   No Known Allergies    Educational/Work History:  Highest level of education obtained: The patient graduated from [employer] with a BBA in Accounting,  and a BM in [employer].   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     Significant Life Events:   Patient been through or witnessed a divorce? no  None.     Patient experienced a death / loss of relationship? yes  The patients grandmother  suddenly in 2018.    Patient experienced a major accident or tragic events? no  None.     Patient experienced any other significant life events or trauma (such as verbal, physical, sexual abuse)? no  None.     Legal History:  The patient has no significant history of legal issues.  Court-ordered: No    PHQ-9 Score:   PHQ-9 Total Score:       MITUL 7: Total Score: 5    Objective     Physical Exam:   Weight 109 kg (241 lb). Body mass index is 34.58 kg/m².     Physical Exam    Patient's Support Network Includes:      Prognosis: Good with Ongoing Treatment     Mental Status Exam:   Hygiene:   good  Cooperation:  Cooperative  Eye Contact:  Good  Psychomotor Behavior:  Appropriate  Affect: appropriate     Mood: happy  Hopelessness: Denies  Speech:  Normal  Thought Process:  Goal directed  Thought Content:  Normal  Suicidal:  None  Homicidal:  None  Hallucinations:  None  Delusion:  None  Memory:  Intact  Orientation:  Person, Place, Time and Situation  Reliability:  good  Insight:  Good  Judgement:  Good  Impulse Control:  Good  Physical/Medical Issues:  No      Assessment / Plan      Assessment/Plan:   Diagnoses and all orders for this visit:    1. Attention deficit hyperactivity disorder (ADHD), inattentive type, mild (Primary)         1. The therapist will continue to promote the therapeutic alliance, address the patients issues and concerns, and strengthen her self awareness, insights, and positive coping skills. These positive coping skills include: visualization, music, art, breathing, exercise, and positive self talk.  Help the patient focus on modifying her negative thoughts, behaviors, and  emotional responses associated with the psychological distress that she might be feeling in regards to the state of the world. Encourage the patient and her  take turns cooking on a weekly basis, and have a weekly family meeting to discuss the menu for the week or if they don't have a family meeting, what ever who's turn it is to cook they choose the other has to have.      TREATMENT PLAN/GOALS: Continue supportive psychotherapy efforts and medications as indicated. Treatment and medication options discussed during today's visit. Patient ackowledged and verbally consented to continue with current treatment plan and was educated on the importance of compliance with treatment and follow-up appointments.     Counseled patient regarding multimodal approach with healthy nutrition, healthy sleep, regular physical activity, social activities, counseling, and medications.      Coping skills reviewed and encouraged positive framing of thoughts. No suicidal ideation or homicidal ideation at this time.      Assisted patient in processing above session content; acknowledged and normalized patient’s thoughts, feelings, and concerns.  Applied  positive coping skills and behavior management in session.    Allowed patient to freely discuss issues without interruption or judgment. Provided safe, confidential environment to facilitate the development of positive therapeutic relationship and encourage open, honest communication. Assisted patient in identifying risk factors which would indicate the need for higher level of care including thoughts to harm self or others and/or self-harming behavior and encouraged patient to contact this office, call 911, or present to the nearest emergency room should any of these events occur. Discussed crisis intervention services and means to access.     Follow Up:   Return for Recheck.    FAIZAN Glover  This document has been electronically signed by FAIZAN Gloevr  August 9, 2022  17:25 EDT    Please note that portions of this note were completed with a voice recognition program. Efforts were made to edit dictation, but occasionally words are mistranscribed.

## 2022-08-09 NOTE — PROGRESS NOTES
Follow Therapy Office Visit      Date: 2022     Patient Name: Conchita SOTO  : 1995   Time In: 4:35  Time Out: 5:10    PCP: Hannah cMkinney APRN    Chief Complaint:     ICD-10-CM ICD-9-CM   1. Mild episode of recurrent major depressive disorder (HCC)  F33.0 296.31       History of Present Illness: Conchita SOTO is a 27 y.o. female who presents today for initial therapy session. Patient arrived for session on time, clean and casually dressed without evidence of intoxication, withdrawal, or perceptual disturbance. Patient was cooperative and agreeable to treatment and interacted with therapist. The patient was seen at the [place] office today. The patient states that she has been feeling better. 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 The patient has decided to take the exam, and they will start studying in September. The patient expressed concern that she is going to have a hard time focusing and concentrating on the material. The therapist did an adult ADHD Symptom Checklist in which the patient scored high on some of the questions. The patient is scheduled to see our Psychiatric ARNP Gladis. The therapist encouraged the patient to talk to her about her concerns.     Subjective      Review of Systems:   The following portions of the patient's history were reviewed and updated as appropriate: allergies, current medications, past family history, past medical history, past social history, past surgical history and problem list.    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    Social History:   Social History     Socioeconomic History   • Marital status:    Tobacco Use    • Smoking status: Never Smoker   • Smokeless tobacco: Never Used   Vaping Use   • Vaping Use: Never used   Substance and Sexual Activity   • Alcohol use: Yes     Alcohol/week: 2.0 standard drinks     Types: 2 Glasses of wine per week     Comment: occasionly 1-2 drinks/week   • Drug use: Never   • Sexual activity: Yes     Partners: Male     Birth control/protection: I.U.D.       Trauma History:  None.     Spiritual:  unknown    Mental Illness and/or Substance Abuse: The patient has been diagnosed with Anxiety, and Depression.      History: No    Medication:     Current Outpatient Medications:   •  amitriptyline (ELAVIL) 10 MG tablet, Take 1 tablet by mouth Every Night., Disp: 30 tablet, Rfl: 4  •  azelastine (ASTELIN) 0.1 % nasal spray, , Disp: , Rfl:   •  busPIRone (BUSPAR) 5 MG tablet, Take 1 tablet by mouth 3 (Three) Times a Day As Needed (anxiety)., Disp: 90 tablet, Rfl: 3  •  EPINEPHrine (EPIPEN) 0.3 MG/0.3ML solution auto-injector injection, Auvi-Q 0.3 mg/0.3 mL injection, auto-injector  Take 1 auto as needed by injection route., Disp: , Rfl:   •  Gentamicin Sulfate (jose's solution for nasal irrigation), into the nostril(s) as directed by provider., Disp: , Rfl:   •  levonorgestrel (Mirena, 52 MG,) 20 MCG/24HR IUD, , Disp: , Rfl:   •  pseudoephedrine-guaifenesin (MUCINEX D)  MG per 12 hr tablet, Mucinex D 60 mg-600 mg tablet,extended release  1 po daily with a full glass of water x7 days then prn, Disp: , Rfl:   •  lisdexamfetamine (Vyvanse) 20 MG capsule, Take 1 capsule by mouth Every Morning, Disp: 30 capsule, Rfl: 0    Allergies:   No Known Allergies    Educational/Work History:  Highest level of education obtained: The patient graduated from [employer] with a BBA in Accounting, and a BM in music.   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     Significant Life Events:   Patient been through or witnessed a divorce?  "no  None.     Patient experienced a death / loss of relationship? yes  The patients grandmother  suddenly in 2018.    Patient experienced a major accident or tragic events? no  None.     Patient experienced any other significant life events or trauma (such as verbal, physical, sexual abuse)? no  None.     Legal History:  The patient has no significant history of legal issues.  Court-ordered: No    PHQ-9 Score:   PHQ-9 Total Score:       MITUL 7: Total Score: 8    Objective     Physical Exam:   Blood pressure 120/70, height 175.3 cm (69\"), weight 109 kg (241 lb). Body mass index is 35.59 kg/m².     Physical Exam    Patient's Support Network Includes:      Prognosis: Good with Ongoing Treatment     Mental Status Exam:   Hygiene:   good  Cooperation:  Cooperative  Eye Contact:  Good  Psychomotor Behavior:  Appropriate  Affect: Euphoric    Mood: happy  Hopelessness: Denies  Speech:  Normal  Thought Process:  Goal directed  Thought Content:  Normal  Suicidal:  None  Homicidal:  None  Hallucinations:  None  Delusion:  None  Memory:  Intact  Orientation:  Person, Place, Time and Situation  Reliability:  good  Insight:  Good  Judgement:  Good  Impulse Control:  Good  Physical/Medical Issues:  No      Assessment / Plan      Assessment/Plan:   Diagnoses and all orders for this visit:    1. Mild episode of recurrent major depressive disorder (HCC) (Primary)         1. The therapist will continue to promote the therapeutic alliance, address the patients issues and concerns, and strengthen her self awareness, insights, and positive coping skills. These positive coping skills include: visualization, music, art, breathing, exercise, and positive self talk.  Help the patient focus on modifying her negative thoughts, behaviors, and emotional responses associated with the psychological distress that she might be feeling in regards to the state of the world. Encourage the patient and her  take turns cooking, and have a " weekly family meeting to discuss the menu for the week.     TREATMENT PLAN/GOALS: Continue supportive psychotherapy efforts and medications as indicated. Treatment and medication options discussed during today's visit. Patient ackowledged and verbally consented to continue with current treatment plan and was educated on the importance of compliance with treatment and follow-up appointments.     Counseled patient regarding multimodal approach with healthy nutrition, healthy sleep, regular physical activity, social activities, counseling, and medications.      Coping skills reviewed and encouraged positive framing of thoughts. No suicidal ideation or homicidal ideation at this time.      Assisted patient in processing above session content; acknowledged and normalized patient’s thoughts, feelings, and concerns.  Applied  positive coping skills and behavior management in session.    Allowed patient to freely discuss issues without interruption or judgment. Provided safe, confidential environment to facilitate the development of positive therapeutic relationship and encourage open, honest communication. Assisted patient in identifying risk factors which would indicate the need for higher level of care including thoughts to harm self or others and/or self-harming behavior and encouraged patient to contact this office, call 911, or present to the nearest emergency room should any of these events occur. Discussed crisis intervention services and means to access.     Follow Up:   No follow-ups on file.    FAIZAN Glover  This document has been electronically signed by FAIZAN Glover  August 9, 2022 17:09 EDT    Please note that portions of this note were completed with a voice recognition program. Efforts were made to edit dictation, but occasionally words are mistranscribed.

## 2022-09-06 ENCOUNTER — OFFICE VISIT (OUTPATIENT)
Dept: BEHAVIORAL HEALTH | Facility: CLINIC | Age: 27
End: 2022-09-06

## 2022-09-06 VITALS — WEIGHT: 236.6 LBS | BODY MASS INDEX: 33.87 KG/M2 | HEIGHT: 70 IN

## 2022-09-06 DIAGNOSIS — F41.1 GENERALIZED ANXIETY DISORDER: ICD-10-CM

## 2022-09-06 DIAGNOSIS — F33.0 MILD EPISODE OF RECURRENT MAJOR DEPRESSIVE DISORDER: ICD-10-CM

## 2022-09-06 DIAGNOSIS — F90.0 ATTENTION DEFICIT HYPERACTIVITY DISORDER (ADHD), INATTENTIVE TYPE, MILD: Primary | ICD-10-CM

## 2022-09-06 PROCEDURE — 99214 OFFICE O/P EST MOD 30 MIN: CPT

## 2022-09-06 NOTE — PROGRESS NOTES
Follow Up Office Visit    Patient Name: Conchita SOTO  : 1995   MRN: [id number]   Care Team: Patient Care Team:  Hannah Mckinney APRN as PCP - General (Family Medicine)  Keyana Portillo LPCC as Counselor (Behavioral Health)  Trang Kowalski APRN as Nurse Practitioner (Family Medicine)        Chief Complaint:    Chief Complaint   Patient presents with   • ADHD   • Depression   • Med Management   • Anxiety       History of Present Illness: Conchita SOTO is a 27 y.o. female who is here today for a medication  management follow up. Patient states that since starting the Vyvanse her focus has improved significantly. She states she feels that she has the motivation to focus and get things done that she did not have prior to starting medication. Patient did not have any concerns at this time.     Subjective   Review of Systems:    Review of Systems   All other systems reviewed and are negative.      Current Medications:   Current Outpatient Medications   Medication Sig Dispense Refill   • amitriptyline (ELAVIL) 10 MG tablet Take 1 tablet by mouth Every Night. 30 tablet 4   • azelastine (ASTELIN) 0.1 % nasal spray      • busPIRone (BUSPAR) 5 MG tablet Take 1 tablet by mouth 3 (Three) Times a Day As Needed (anxiety). 90 tablet 3   • EPINEPHrine (EPIPEN) 0.3 MG/0.3ML solution auto-injector injection Auvi-Q 0.3 mg/0.3 mL injection, auto-injector   Take 1 auto as needed by injection route.     • Gentamicin Sulfate (jose's solution for nasal irrigation) into the nostril(s) as directed by provider.     • levonorgestrel (Mirena, 52 MG,) 20 MCG/24HR IUD      • lisdexamfetamine (Vyvanse) 20 MG capsule Take 1 capsule by mouth Every Morning 30 capsule 0   • pseudoephedrine-guaifenesin (MUCINEX D)  MG per 12 hr tablet Mucinex D 60 mg-600 mg tablet,extended release   1 po daily with a full glass of water x7 days then prn       No current facility-administered medications for this visit.       Mental Status  "Exam:   Hygiene:   good  Cooperation:  Cooperative  Eye Contact:  Good  Psychomotor Behavior:  Appropriate  Affect:  Appropriate  Mood: normal  Speech:  Normal  Thought Process:  Goal directed and Linear  Thought Content:  Normal  Suicidal:  None  Homicidal:  None  Hallucinations:  None  Delusion:  None  Memory:  Intact  Orientation:  Person, Place, Time and Situation  Reliability:  good  Insight:  Good  Judgement:  Good  Impulse Control:  Good  Physical/Medical Issues:  No      Objective   Vital Signs:   Ht 177.8 cm (70\")   Wt 107 kg (236 lb 9.6 oz)   BMI 33.95 kg/m²       Assessment / Plan    Diagnoses and all orders for this visit:    1. Attention deficit hyperactivity disorder (ADHD), inattentive type, mild (Primary)  -     lisdexamfetamine (Vyvanse) 20 MG capsule; Take 1 capsule by mouth Every Morning  Dispense: 30 capsule; Refill: 0    2. Generalized anxiety disorder  -     lisdexamfetamine (Vyvanse) 20 MG capsule; Take 1 capsule by mouth Every Morning  Dispense: 30 capsule; Refill: 0    3. Mild episode of recurrent major depressive disorder (HCC)  -     lisdexamfetamine (Vyvanse) 20 MG capsule; Take 1 capsule by mouth Every Morning  Dispense: 30 capsule; Refill: 0     Will continue Vyvanse as ordered.     A psychological evaluation was conducted in order to assess past and current level of functioning. Areas assessed included, but were not limited to: perception of social support, perception of ability to face and deal with challenges in life (positive functioning), anxiety symptoms, depressive symptoms, perspective on beliefs/belief system, coping skills for stress, intelligence level,  Therapeutic rapport was established. Interventions conducted today were geared towards incorporating medication management along with support for continued therapy. Education was also provided as to the med management with this provider and what to expect in subsequent sessions.      We discussed risks, benefits, goals and " side effects of the above medication and the patient was agreeable with the plan. Patient was educated on the importance of compliance with treatment and follow-up appointments. Patient is aware to contact the [facility] with any worsening of symptoms. To call for questions or concerns and return early if necessary. Patent is agreeable to go to the Emergency Department or call 911 should they begin SI/HI.      PHQ-2/PHQ-9: Depression Screening  Little Interest or Pleasure in Doing Things: 1-->several days  Feeling Down, Depressed or Hopeless: 1-->several days  PHQ-2 Total Score: 2  Trouble Falling or Staying Asleep, or Sleeping Too Much: 2-->more than half the days  Feeling Tired or Having Little Energy: 1-->several days  Poor Appetite or Overeatin-->several days  Feeling Bad about Yourself - or that You are a Failure or Have Let Yourself or Your Family Down: 0-->not at all  Trouble Concentrating on Things, Such as Reading the Newspaper or Watching Television: 1-->several days  Moving or Speaking So Slowly that Other People Could Have Noticed? Or the Opposite - Being So Fidgety: 0-->not at all  Thoughts that You Would be Better Off Dead or of Hurting Yourself in Some Way: 0-->not at all  PHQ-9: Brief Depression Severity Measure Score: 7  If You Checked Off Any Problems, How Difficult Have These Problems Made It For You to Do Your Work, Take Care of Things at Home, or Get Along with Other People?: somewhat difficult    PHQ-9 Score:   PHQ-9 Total Score: 7    Depression Screening:  Patient screened positive for depression based on a PHQ-9 score of 7 on 2022. Follow-up recommendations include: Suicide Risk Assessment performed.    Over the last two weeks, how often have you been bothered by the following problems?  Feeling nervous, anxious or on edge: Several days  Not being able to stop or control worrying: Not at all  Worrying too much about different things: Several days  Trouble Relaxing: Several  days  Being so restless that it is hard to sit still: Not at all  Becoming easily annoyed or irritable: Several days  Feeling afraid as if something awful might happen: Several days  MITUL 7 Total Score: 5  If you checked any problems, how difficult have these problems made it for you to do your work, take care of things at home, or get along with other people: Somewhat difficult    Screening for Adults With ADHD - (1-6)  1. How often do you have trouble wrapping up the final details of a project, once the challenging parts have been done?: Rarely  2. How often do you have difficulty getting things in order when you have to do a task that requires organization?: Rarely  3. How often do you have problems remembering appointments or obligations : Rarely  4. When you have a task that requires a lot of thought, how often do you avoid or delay getting started ?: Often  5. How often do you fidget or squirm with your hands or feet when you have to sit down for a long time?: Often  6. How often do you feel overly active and compelled to do things, like you were driven by a motor?: Sometimes  7. How often do you make careless mistakes when you have to work on a boring or difficult project?: Often  8. How often do have difficulty keeping your attention when you are doing boring or repetitive work?: Often  9. How often do you have difficulty concentrating on what people say to you, even when they are speaking to you: Sometimes  10.How often do you misplace or have difficulty finding things at home or at work?: Rarely  11.How often are you distracted by activity or noise around you?: Often  12.How often do you leave your seat in meetings or other situations in which you are expected to remain seated?: Never  13.How often do you feel restless or fidgety?: Very Often  14.How often do you have difficulty unwinding and relaxing when you have time to yourself?: Often  15.How often do you find yourself talking too much when you are in  social situations?: Rarely  16.When you’re in a conversation, how often do you find yourself finishing the sentences of the people you are talking to, before they can finish them themselves?: Rarely  17.How often do you have difficulty waiting your turn in situations when turn taking is required?: Never  18.How often do you interrupt others when they are busy?: Sometimes          MEDS ORDERED DURING VISIT:  New Medications Ordered This Visit   Medications   • lisdexamfetamine (Vyvanse) 20 MG capsule     Sig: Take 1 capsule by mouth Every Morning     Dispense:  30 capsule     Refill:  0         Follow Up   Return in about 3 months (around 12/6/2022).  Patient was given instructions and counseling regarding her condition or for health maintenance advice. Please see specific information pulled into the AVS if appropriate.     TREATMENT PLAN/GOALS: Continue supportive psychotherapy efforts and medications as indicated. Treatment and medication options discussed during today's visit. Patient acknowledged and verbally consented to continue with current treatment plan and was educated on the importance of compliance with treatment and follow-up appointments.    MEDICATION ISSUES:  Discussed medication options and treatment plan of prescribed medication as well as the risks, benefits, and side effects including potential falls, possible impaired driving and metabolic adversities among others. Patient is agreeable to call the office with any worsening of symptoms or onset of side effects. Patient is agreeable to call 911 or go to the nearest ER should he/she begin having SI/HI.      KARIN Denton [facility] [address] [address]  [phone]    September 6, 2022 12:12 EDT

## 2022-09-22 ENCOUNTER — TELEPHONE (OUTPATIENT)
Dept: INTERNAL MEDICINE | Facility: CLINIC | Age: 27
End: 2022-09-22

## 2022-09-22 RX ORDER — AMITRIPTYLINE HYDROCHLORIDE 10 MG/1
10 TABLET, FILM COATED ORAL NIGHTLY
Qty: 30 TABLET | Refills: 4 | Status: SHIPPED | OUTPATIENT
Start: 2022-09-22 | End: 2023-01-12 | Stop reason: SDUPTHER

## 2022-09-22 NOTE — TELEPHONE ENCOUNTER
Rx Refill Note  Requested Prescriptions     Pending Prescriptions Disp Refills   • amitriptyline (ELAVIL) 10 MG tablet 30 tablet 4     Sig: Take 1 tablet by mouth Every Night.      Last office visit with prescribing clinician: 1/14/2022      Next office visit with prescribing clinician: 12/9/2022            ANGEL MARTINEZ MA  09/22/22, 10:07 EDT

## 2022-09-22 NOTE — TELEPHONE ENCOUNTER
Caller: Conchita SOTO    Relationship: Self    Best call back number:  [phone]    Requested Prescriptions:   Requested Prescriptions      No prescriptions requested or ordered in this encounter      amitriptyline (ELAVIL) 10 MG tablet    Pharmacy where request should be sent: [facility] [address] [place] [address] [address] AT [facility] [phone] [facility] [phone] FX       Does the patient have less than a 3 day supply:  [x] Yes  [] No    Alvin Aranda Rep   09/22/22 10:04 EDT

## 2022-09-29 ENCOUNTER — TELEMEDICINE (OUTPATIENT)
Dept: BEHAVIORAL HEALTH | Facility: CLINIC | Age: 27
End: 2022-09-29

## 2022-09-29 DIAGNOSIS — F41.1 GENERALIZED ANXIETY DISORDER: Primary | ICD-10-CM

## 2022-09-29 DIAGNOSIS — F43.9 TRAUMA AND STRESSOR-RELATED DISORDER: ICD-10-CM

## 2022-09-29 PROCEDURE — 90832 PSYTX W PT 30 MINUTES: CPT | Performed by: COUNSELOR

## 2022-10-11 ENCOUNTER — TELEMEDICINE (OUTPATIENT)
Dept: BEHAVIORAL HEALTH | Facility: CLINIC | Age: 27
End: 2022-10-11

## 2022-10-11 DIAGNOSIS — F33.0 MILD EPISODE OF RECURRENT MAJOR DEPRESSIVE DISORDER: ICD-10-CM

## 2022-10-11 DIAGNOSIS — F41.1 GENERALIZED ANXIETY DISORDER: ICD-10-CM

## 2022-10-11 DIAGNOSIS — F90.0 ATTENTION DEFICIT HYPERACTIVITY DISORDER (ADHD), INATTENTIVE TYPE, MILD: ICD-10-CM

## 2022-10-11 PROCEDURE — 90834 PSYTX W PT 45 MINUTES: CPT | Performed by: COUNSELOR

## 2022-11-08 ENCOUNTER — OFFICE VISIT (OUTPATIENT)
Dept: BEHAVIORAL HEALTH | Facility: CLINIC | Age: 27
End: 2022-11-08

## 2022-11-08 VITALS — HEIGHT: 70 IN | WEIGHT: 235 LBS | BODY MASS INDEX: 33.64 KG/M2

## 2022-11-08 DIAGNOSIS — F43.23 ADJUSTMENT DISORDER WITH MIXED ANXIETY AND DEPRESSED MOOD: Primary | ICD-10-CM

## 2022-11-08 PROCEDURE — 90832 PSYTX W PT 30 MINUTES: CPT | Performed by: COUNSELOR

## 2022-11-10 DIAGNOSIS — F90.0 ATTENTION DEFICIT HYPERACTIVITY DISORDER (ADHD), INATTENTIVE TYPE, MILD: ICD-10-CM

## 2022-11-10 DIAGNOSIS — F33.0 MILD EPISODE OF RECURRENT MAJOR DEPRESSIVE DISORDER: ICD-10-CM

## 2022-11-10 DIAGNOSIS — F41.1 GENERALIZED ANXIETY DISORDER: ICD-10-CM

## 2022-11-21 NOTE — PROGRESS NOTES
"     Telehealth Video Therapy Visit      Date: 10/11/2022     Patient Name: Conchita SOTO  : 1995   Time In: 4:25  Time Out: 5:04    Disclosure: You have chosen to receive care through a video visit today. Do you consent to use the phone and/or a video visit for your behavioral health today? Yes         Chief Complaint:  Generalized Anxiety Disorder F41.1                                 Trauma and Stress Related Disorder F43.9      History of Present Illness: Conchita SOTO is a 27 y.o. female that has agreed to be seen via a telehealth visit today. Conchita SOTO has stated that they are in a secure environment for this session. The provider is located at [facility]. The patient is seen remotely at her  home, using Epic Video Visit (HIPAA compliant). The patient states that she is doing well on her medication, and she now feels like she can focus better. The patient discussed that her sleep is been off mainly because she has been stressed and she feels that she has been having a lot of \"stress dreams\". The patient is currently stressed because she has interviewed for a job that will provide her with more opportunity for growth; as a  at a dentist office. 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 The patient is appropriate for a telemedicine visit. I identified myself Keyana Portillo [facility]  along with my credentials of [facility].@ can refuse to be seen remotely at any time. The electronic data is encrypted and password protected, and the patient has been advised of the potential risks to privacy, not withstanding such measures.    Subjective      Review of Systems:   The following portions of the patient's history were reviewed and updated as appropriate: allergies, current medications, past family history, past medical history, past social history, past surgical history and problem list.    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    Social History:   Social History     Socioeconomic History   • Marital status:    Tobacco Use   • Smoking status: Never   • Smokeless tobacco: Never   Vaping Use   • Vaping Use: Never used   Substance and Sexual Activity   • Alcohol use: Yes     Alcohol/week: 2.0 standard drinks     Types: 2 Glasses of wine per week     Comment: occasionly 1-2 drinks/week   • Drug use: Never   • Sexual activity: Yes     Partners: Male     Birth control/protection: I.U.D.       Medications:     Current Outpatient Medications:   •  amitriptyline (ELAVIL) 10 MG tablet, Take 1 tablet by mouth Every Night., Disp: 30 tablet, Rfl: 4  •  azelastine (ASTELIN) 0.1 % nasal spray, , Disp: , Rfl:   •  busPIRone (BUSPAR) 5 MG tablet, Take 1 tablet by mouth 3 (Three) Times a Day As Needed (anxiety)., Disp: 90 tablet, Rfl: 3  •  EPINEPHrine (EPIPEN) 0.3 MG/0.3ML solution auto-injector injection, Auvi-Q 0.3 mg/0.3 mL injection, auto-injector  Take 1 auto as needed by injection route., Disp: , Rfl:   •  Gentamicin Sulfate (jose's solution for nasal irrigation), into the nostril(s) as directed by provider., Disp: , Rfl:   •  levonorgestrel (Mirena, 52 MG,) 20 MCG/24HR IUD, , Disp: , Rfl:   •  lisdexamfetamine (Vyvanse) 20 MG capsule, Take 1 capsule by mouth Every Morning, Disp: 30 capsule, Rfl: 0  •  pseudoephedrine-guaifenesin (MUCINEX D)  MG per 12 hr tablet, Mucinex D 60 mg-600 mg tablet,extended release  1 po daily with a full glass of water x7 days then prn, Disp: , Rfl:     Allergies:   No Known Allergies    PHQ-9 Score:   PHQ-9 Total Score: unknown    Objective     Physical Exam:   There were no vitals taken for this visit. There is no height or  weight on file to calculate BMI.     Physical Exam    Patient's Support Network Includes:      Prognosis: Good with Ongoing Treatment     Mental Status Exam:   Hygiene:   good  Cooperation:  Cooperative  Eye Contact:  Good  Psychomotor Behavior:  Appropriate  Affect: euthymic  Mood: happy  Hopelessness: Denies  Speech:  Normal  Thought Process:  Goal directed  Thought Content:  Normal  Suicidal:  None  Homicidal:  None  Hallucinations:  None  Delusion:  None  Memory:  Intact  Orientation:  Person, Place, Time and Situation  Reliability:  good  Insight:  Good  Judgement:  Good  Impulse Control:  Good  Physical/Medical Issues:  No    Nothing has changed.  Assessment / Plan      Assessment/Plan:   There are no diagnoses linked to this encounter.     1. The therapist will continue to promote the therapeutic alliance, address the patient's issues and concerns, and strengthen her self-awareness, self-esteem, insights, and positive coping skills.  These positive coping skills include visualization, music, breathing, exercising, and positive self talk.  The therapist encouraged the patient to continue to take her medication as prescribed.    TREATMENT PLAN/GOALS: Continue supportive psychotherapy efforts and medications as indicated. Treatment and medication options discussed during today's visit. Patient ackowledged and verbally consented to continue with current treatment plan and was educated on the importance of compliance with treatment and follow-up appointments.     Counseled patient regarding multimodal approach with healthy nutrition, healthy sleep, regular physical activity, social activities, counseling, and medications.      Coping skills reviewed and encouraged positive framing of thoughts. No suicidal ideation or homicidal ideation at this time.      Assisted patient in processing above session content; acknowledged and normalized patient’s thoughts, feelings, and concerns.  Applied  positive coping  skills and behavior management in session.    Allowed patient to freely discuss issues without interruption or judgment. Provided safe, confidential environment to facilitate the development of positive therapeutic relationship and encourage open, honest communication. Assisted patient in identifying risk factors which would indicate the need for higher level of care including thoughts to harm self or others and/or self-harming behavior and encouraged patient to contact this office, call 911, or present to the nearest emergency room should any of these events occur. Discussed crisis intervention services and means to access.     Follow Up:   No follow-ups on file.    FAIZAN Glover  This document has been electronically signed by FAIZAN Glover  November 21, 2022 09:02 EST    Please note that portions of this note were completed with a voice recognition program. Efforts were made to edit dictation, but occasionally words are mistranscribed.

## 2022-12-06 ENCOUNTER — TELEMEDICINE (OUTPATIENT)
Dept: BEHAVIORAL HEALTH | Facility: CLINIC | Age: 27
End: 2022-12-06

## 2022-12-06 DIAGNOSIS — F41.1 GENERALIZED ANXIETY DISORDER: ICD-10-CM

## 2022-12-06 DIAGNOSIS — F90.0 ATTENTION DEFICIT HYPERACTIVITY DISORDER (ADHD), INATTENTIVE TYPE, MILD: Primary | ICD-10-CM

## 2022-12-06 DIAGNOSIS — F33.0 MILD EPISODE OF RECURRENT MAJOR DEPRESSIVE DISORDER: ICD-10-CM

## 2022-12-06 PROCEDURE — 99214 OFFICE O/P EST MOD 30 MIN: CPT

## 2022-12-06 NOTE — PROGRESS NOTES
This provider is located at [facility] ,[address], [address] in [place], [place],using a secure MyChart Video Visit through [employer]. Patient is being seen remotely via telehealth at their home address in [place], and stated they are in a secure environment for this session. The patient's condition being diagnosed/treated is appropriate for telemedicine. The provider identified herself as well as her credentials.   The patient, and/or patients guardian, consent to be seen remotely, and when consent is given they understand that the consent allows for patient identifiable information to be sent to a third party as needed.   They may refuse to be seen remotely at any time. The electronic data is encrypted and password protected, and the patient and/or guardian has been advised of the potential risks to privacy not withstanding such measures.    Video Visit    Patient Name: Conchita SOTO  : 1995   MRN: [id number]   Care Team: Patient Care Team:  Hannah Mckinney APRN as PCP - General (Family Medicine)  Keyana Portillo [facility] as Counselor (Behavioral Health)  Trang Kowalski APRN as Nurse Practitioner (Family Medicine)        Chief Complaint:    Chief Complaint   Patient presents with   • ADHD   • Med Management       History of Present Illness: Conchita SOTO is a 27 y.o. female who presents via video visit for a medication management follow up. Patient reports that Vyvanse continues to be effective. She feels that her focus and task completion continue to be much better than they were before medication. She states she has noticed a slight difference in her sleeping quality but nothing that is not manageable and did not want to change medication.     Subjective   Review of Systems:    Review of Systems   Psychiatric/Behavioral: Positive for sleep disturbance.   All other systems reviewed and are negative.      Current Medications:   Current Outpatient  Medications   Medication Sig Dispense Refill   • lisdexamfetamine (Vyvanse) 20 MG capsule Take 1 capsule by mouth Every Morning 30 capsule 0   • amitriptyline (ELAVIL) 10 MG tablet Take 1 tablet by mouth Every Night. 30 tablet 4   • azelastine (ASTELIN) 0.1 % nasal spray      • busPIRone (BUSPAR) 5 MG tablet Take 1 tablet by mouth 3 (Three) Times a Day As Needed (anxiety). 90 tablet 3   • EPINEPHrine (EPIPEN) 0.3 MG/0.3ML solution auto-injector injection Auvi-Q 0.3 mg/0.3 mL injection, auto-injector   Take 1 auto as needed by injection route.     • Gentamicin Sulfate (jose's solution for nasal irrigation) into the nostril(s) as directed by provider.     • levonorgestrel (Mirena, 52 MG,) 20 MCG/24HR IUD      • pseudoephedrine-guaifenesin (MUCINEX D)  MG per 12 hr tablet Mucinex D 60 mg-600 mg tablet,extended release   1 po daily with a full glass of water x7 days then prn       No current facility-administered medications for this visit.       Mental Status Exam:   Hygiene:   good  Cooperation:  Cooperative  Eye Contact:  Good  Psychomotor Behavior:  unable to assess  Affect:  Appropriate  Mood: normal  Speech:  Normal  Thought Process:  Goal directed and Linear  Thought Content:  Normal and Mood congruent  Suicidal:  None  Homicidal:  None  Hallucinations:  None  Delusion:  None  Memory:  Intact  Orientation:  Person, Place, Time and Situation  Reliability:  good  Insight:  Good  Judgement:  Good  Impulse Control:  Good  Physical/Medical Issues:  No      Objective   Vital Signs:   There were no vitals taken for this visit.      Assessment / Plan    Diagnoses and all orders for this visit:    1. Attention deficit hyperactivity disorder (ADHD), inattentive type, mild (Primary)  -     lisdexamfetamine (Vyvanse) 20 MG capsule; Take 1 capsule by mouth Every Morning  Dispense: 30 capsule; Refill: 0    2. Generalized anxiety disorder  -     lisdexamfetamine (Vyvanse) 20 MG capsule; Take 1 capsule by mouth Every  Morning  Dispense: 30 capsule; Refill: 0    3. Mild episode of recurrent major depressive disorder (HCC)  -     lisdexamfetamine (Vyvanse) 20 MG capsule; Take 1 capsule by mouth Every Morning  Dispense: 30 capsule; Refill: 0      Patient appears stable on current medication. No indication for change. Will continue Vyvanse as ordered.     A psychological evaluation was conducted in order to assess past and current level of functioning. Areas assessed included, but were not limited to: perception of social support, perception of ability to face and deal with challenges in life (positive functioning), anxiety symptoms, depressive symptoms, perspective on beliefs/belief system, coping skills for stress, intelligence level,  Therapeutic rapport was established. Interventions conducted today were geared towards incorporating medication management along with support for continued therapy. Education was also provided as to the med management with this provider and what to expect in subsequent sessions.      We discussed risks, benefits, goals and side effects of the above medication and the patient was agreeable with the plan. Patient was educated on the importance of compliance with treatment and follow-up appointments. Patient is aware to contact the [facility] with any worsening of symptoms. To call for questions or concerns and return early if necessary. Patent is agreeable to go to the Emergency Department or call 911 should they begin SI/HI.      MEDS ORDERED DURING VISIT:  New Medications Ordered This Visit   Medications   • lisdexamfetamine (Vyvanse) 20 MG capsule     Sig: Take 1 capsule by mouth Every Morning     Dispense:  30 capsule     Refill:  0         Follow Up   Return in about 3 months (around 3/6/2023).  Patient was given instructions and counseling regarding her condition or for health maintenance advice. Please see specific information pulled into the AVS if appropriate.     TREATMENT PLAN/GOALS:  Continue supportive psychotherapy efforts and medications as indicated. Treatment and medication options discussed during today's visit. Patient acknowledged and verbally consented to continue with current treatment plan and was educated on the importance of compliance with treatment and follow-up appointments.    MEDICATION ISSUES:  Discussed medication options and treatment plan of prescribed medication as well as the risks, benefits, and side effects including potential falls, possible impaired driving and metabolic adversities among others. Patient is agreeable to call the office with any worsening of symptoms or onset of side effects. Patient is agreeable to call 911 or go to the nearest ER should he/she begin having SI/HI.      KARIN Denton [facility] [address] [address] [phone]    December 6, 2022 11:52 EST

## 2022-12-29 NOTE — PROGRESS NOTES
Follow Therapy Office Visit      Date: 2022     Patient Name: Conchita SOTO  : 1995   Time In: 5:15  Time Out: 5:49    PCP: Hannah Mckinney APRN    Chief Complaint:     ICD-10-CM ICD-9-CM   1. Adjustment disorder with mixed anxiety and depressed mood  F43.23 309.28       History of Present Illness: Conchita SOTO is a 27 y.o. female who presents today for initial therapy session. Patient arrived for session on time, clean and casually dressed without evidence of intoxication, withdrawal, or perceptual disturbance. Patient was cooperative and agreeable to treatment and interacted with therapist. The patient was seen at the [place] office today. The patient discussed that she has not started her new job yet, but will be starting it on Monday. 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 The patient discussed that her  is also very excited about her new job and hopes to spend more time with her. The patient explained her frustrations regarding her 's father who invited himself over to their house. The patient feels that the Vyvanse is working, and feels that her anxiety is drastically gone.      Subjective      Review of Systems:   The following portions of the patient's history were reviewed and updated as appropriate: allergies, current medications, past family history, past medical history, past social history, past surgical history and problem list.    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 No pertinent family history.    Social History:   Social History     Socioeconomic History   • Marital status:    Tobacco Use   •  Smoking status: Never   • Smokeless tobacco: Never   Vaping Use   • Vaping Use: Never used   Substance and Sexual Activity   • Alcohol use: Yes     Alcohol/week: 2.0 standard drinks     Types: 2 Glasses of wine per week     Comment: occasionly 1-2 drinks/week   • Drug use: Never   • Sexual activity: Yes     Partners: Male     Birth control/protection: I.U.D.       Trauma History:  None.     Spiritual:  unknown    Mental Illness and/or Substance Abuse: The patient has been diagnosed with Anxiety, and Depression.      History: No    Medication:     Current Outpatient Medications:   •  amitriptyline (ELAVIL) 10 MG tablet, Take 1 tablet by mouth Every Night., Disp: 30 tablet, Rfl: 4  •  azelastine (ASTELIN) 0.1 % nasal spray, , Disp: , Rfl:   •  busPIRone (BUSPAR) 5 MG tablet, Take 1 tablet by mouth 3 (Three) Times a Day As Needed (anxiety)., Disp: 90 tablet, Rfl: 3  •  EPINEPHrine (EPIPEN) 0.3 MG/0.3ML solution auto-injector injection, Auvi-Q 0.3 mg/0.3 mL injection, auto-injector  Take 1 auto as needed by injection route., Disp: , Rfl:   •  Gentamicin Sulfate (jose's solution for nasal irrigation), into the nostril(s) as directed by provider., Disp: , Rfl:   •  levonorgestrel (Mirena, 52 MG,) 20 MCG/24HR IUD, , Disp: , Rfl:   •  pseudoephedrine-guaifenesin (MUCINEX D)  MG per 12 hr tablet, Mucinex D 60 mg-600 mg tablet,extended release  1 po daily with a full glass of water x7 days then prn, Disp: , Rfl:   •  lisdexamfetamine (Vyvanse) 20 MG capsule, Take 1 capsule by mouth Every Morning, Disp: 30 capsule, Rfl: 0    Allergies:   No Known Allergies    Educational/Work History:  Highest level of education obtained: The patient graduated from [employer] with a BBA in Accounting, and a BM in music.   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     Significant Life Events:   Patient been through or witnessed a divorce? no  None.     Patient  "experienced a death / loss of relationship? yes  The patients grandmother  suddenly in 2018.    Patient experienced a major accident or tragic events? no  None.     Patient experienced any other significant life events or trauma (such as verbal, physical, sexual abuse)? no  None.     Legal History:  The patient has no significant history of legal issues.  Court-ordered: No    PHQ-9 Score:   PHQ-9 Total Score: 1     MITUL 7: Total Score: 3    Objective     Physical Exam:   Height 177.8 cm (70\"), weight 107 kg (235 lb). Body mass index is 33.72 kg/m².     Physical Exam    Patient's Support Network Includes:      Prognosis: Good with Ongoing Treatment     Mental Status Exam:   Hygiene:   good  Cooperation:  Cooperative  Eye Contact:  Good  Psychomotor Behavior:  Appropriate  Affect: appropriate     Mood: happy  Hopelessness: Denies  Speech:  Normal  Thought Process:  Goal directed  Thought Content:  Normal  Suicidal:  None  Homicidal:  None  Hallucinations:  None  Delusion:  None  Memory:  Intact  Orientation:  Person, Place, Time and Situation  Reliability:  good  Insight:  Good  Judgement:  Good  Impulse Control:  Good  Physical/Medical Issues:  No    Nothing has changed  Assessment / Plan      Assessment/Plan:   Diagnoses and all orders for this visit:    1. Adjustment disorder with mixed anxiety and depressed mood (Primary)         1. The therapist will continue to promote the therapeutic alliance, address the patients issues and concerns, and strengthen her self awareness, insights, and positive coping skills. These positive coping skills include: visualization, music, art, breathing, exercise, and positive self talk.  Help the patient focus on modifying her negative thoughts, behaviors, and emotional responses associated with the psychological distress that she might be feeling in regards to the state of the world. Encourage the patient to have open/more communication with her  regarding " expectations of visitors.       TREATMENT PLAN/GOALS: Continue supportive psychotherapy efforts and medications as indicated. Treatment and medication options discussed during today's visit. Patient ackowledged and verbally consented to continue with current treatment plan and was educated on the importance of compliance with treatment and follow-up appointments.     Counseled patient regarding multimodal approach with healthy nutrition, healthy sleep, regular physical activity, social activities, counseling, and medications.      Coping skills reviewed and encouraged positive framing of thoughts. No suicidal ideation or homicidal ideation at this time.      Assisted patient in processing above session content; acknowledged and normalized patient’s thoughts, feelings, and concerns.  Applied  positive coping skills and behavior management in session.    Allowed patient to freely discuss issues without interruption or judgment. Provided safe, confidential environment to facilitate the development of positive therapeutic relationship and encourage open, honest communication. Assisted patient in identifying risk factors which would indicate the need for higher level of care including thoughts to harm self or others and/or self-harming behavior and encouraged patient to contact this office, call 911, or present to the nearest emergency room should any of these events occur. Discussed crisis intervention services and means to access.     Follow Up:   Return for Recheck.    FAIZAN Glover  This document has been electronically signed by FAIZAN Glover  December 29, 2022 08:47 EST    Please note that portions of this note were completed with a voice recognition program. Efforts were made to edit dictation, but occasionally words are mistranscribed.

## 2023-01-12 ENCOUNTER — OFFICE VISIT (OUTPATIENT)
Dept: INTERNAL MEDICINE | Facility: CLINIC | Age: 28
End: 2023-01-12
Payer: COMMERCIAL

## 2023-01-12 VITALS
HEART RATE: 90 BPM | TEMPERATURE: 97.1 F | HEIGHT: 70 IN | BODY MASS INDEX: 33.07 KG/M2 | WEIGHT: 231 LBS | OXYGEN SATURATION: 97 % | DIASTOLIC BLOOD PRESSURE: 84 MMHG | SYSTOLIC BLOOD PRESSURE: 118 MMHG

## 2023-01-12 DIAGNOSIS — Z71.85 IMMUNIZATION COUNSELING: ICD-10-CM

## 2023-01-12 DIAGNOSIS — F90.0 ATTENTION DEFICIT HYPERACTIVITY DISORDER (ADHD), PREDOMINANTLY INATTENTIVE TYPE: ICD-10-CM

## 2023-01-12 DIAGNOSIS — E66.09 CLASS 1 OBESITY DUE TO EXCESS CALORIES WITHOUT SERIOUS COMORBIDITY WITH BODY MASS INDEX (BMI) OF 33.0 TO 33.9 IN ADULT: ICD-10-CM

## 2023-01-12 DIAGNOSIS — G43.009 MIGRAINE WITHOUT AURA AND WITHOUT STATUS MIGRAINOSUS, NOT INTRACTABLE: ICD-10-CM

## 2023-01-12 DIAGNOSIS — Z00.00 ENCOUNTER FOR ANNUAL PHYSICAL EXAM: Primary | ICD-10-CM

## 2023-01-12 DIAGNOSIS — E78.00 ELEVATED LDL CHOLESTEROL LEVEL: ICD-10-CM

## 2023-01-12 DIAGNOSIS — F41.9 ANXIETY: ICD-10-CM

## 2023-01-12 LAB
ALBUMIN SERPL-MCNC: 4.8 G/DL (ref 3.5–5.2)
ALBUMIN/GLOB SERPL: 2 G/DL
ALP SERPL-CCNC: 78 U/L (ref 39–117)
ALT SERPL-CCNC: 8 U/L (ref 1–33)
AST SERPL-CCNC: 14 U/L (ref 1–32)
BILIRUB SERPL-MCNC: 0.2 MG/DL (ref 0–1.2)
BUN SERPL-MCNC: 15 MG/DL (ref 6–20)
BUN/CREAT SERPL: 19.2 (ref 7–25)
CALCIUM SERPL-MCNC: 9.3 MG/DL (ref 8.6–10.5)
CHLORIDE SERPL-SCNC: 105 MMOL/L (ref 98–107)
CHOLEST SERPL-MCNC: 193 MG/DL (ref 0–200)
CO2 SERPL-SCNC: 24.9 MMOL/L (ref 22–29)
CREAT SERPL-MCNC: 0.78 MG/DL (ref 0.57–1)
EGFRCR SERPLBLD CKD-EPI 2021: 107.6 ML/MIN/1.73
ERYTHROCYTE [DISTWIDTH] IN BLOOD BY AUTOMATED COUNT: 12.6 % (ref 12.3–15.4)
GLOBULIN SER CALC-MCNC: 2.4 GM/DL
GLUCOSE SERPL-MCNC: 93 MG/DL (ref 65–99)
HCT VFR BLD AUTO: 40.3 % (ref 34–46.6)
HDLC SERPL-MCNC: 40 MG/DL (ref 40–60)
HGB BLD-MCNC: 12.8 G/DL (ref 12–15.9)
LDLC SERPL CALC-MCNC: 139 MG/DL (ref 0–100)
MCH RBC QN AUTO: 29.2 PG (ref 26.6–33)
MCHC RBC AUTO-ENTMCNC: 31.8 G/DL (ref 31.5–35.7)
MCV RBC AUTO: 91.8 FL (ref 79–97)
PLATELET # BLD AUTO: 237 10*3/MM3 (ref 140–450)
POTASSIUM SERPL-SCNC: 4.3 MMOL/L (ref 3.5–5.2)
PROT SERPL-MCNC: 7.2 G/DL (ref 6–8.5)
RBC # BLD AUTO: 4.39 10*6/MM3 (ref 3.77–5.28)
SODIUM SERPL-SCNC: 140 MMOL/L (ref 136–145)
TRIGL SERPL-MCNC: 75 MG/DL (ref 0–150)
VLDLC SERPL CALC-MCNC: 14 MG/DL (ref 5–40)
WBC # BLD AUTO: 4.72 10*3/MM3 (ref 3.4–10.8)

## 2023-01-12 PROCEDURE — 90471 IMMUNIZATION ADMIN: CPT | Performed by: NURSE PRACTITIONER

## 2023-01-12 PROCEDURE — 90686 IIV4 VACC NO PRSV 0.5 ML IM: CPT | Performed by: NURSE PRACTITIONER

## 2023-01-12 PROCEDURE — 0124A PR ADM SARSCOV2 30MCG/0.3ML BST: CPT | Performed by: NURSE PRACTITIONER

## 2023-01-12 PROCEDURE — 91312 COVID-19 (PFIZER) BIVALENT BOOSTER 12+YRS: CPT | Performed by: NURSE PRACTITIONER

## 2023-01-12 PROCEDURE — 99395 PREV VISIT EST AGE 18-39: CPT | Performed by: NURSE PRACTITIONER

## 2023-01-12 RX ORDER — AMITRIPTYLINE HYDROCHLORIDE 10 MG/1
10 TABLET, FILM COATED ORAL NIGHTLY
Qty: 30 TABLET | Refills: 12 | Status: SHIPPED | OUTPATIENT
Start: 2023-01-12

## 2023-01-12 NOTE — PROGRESS NOTES
Subjective   Conchita SOTO is a 27 y.o. female and is here for a comprehensive physical exam. The patient reports no problems.    HPI: here today for annual physical exam with no acute problems today.   She continues amitriptyline for anxiety and migraine prevention. She is having migraines once per month around her menses. She is able to control them better now and not debilitating. She is seeing behavioral health for counseling and ADHD management. Currently on vyvanse. Anxiety is not worse with medication.   She would like her flu and COVID booster today.     Health Habits:  Eye exam within last 2 years? Yes.   Dental exam every 6 months? No, will schedule.   Exercise habits: She has started yoga 5 days per week at home.   Healthy diet? Does not follow a certain diet, tries to eat a balanced diet.     The ASCVD Risk score (Grecia RODRIGUEZ, et al., 2019) failed to calculate for the following reasons:    The 2019 ASCVD risk score is only valid for ages 40 to 79    Do you take any herbs or supplements that were not prescribed by a doctor? yes- multivitamin   Are you taking calcium supplements? No  Are you taking aspirin daily? No     History:  LMP: No LMP recorded. Patient has had an implant.  Menopause: No  Last pap date: 12/2021  Abnormal pap? no  Family history of breast or ovarian cancer: no       OB History   No obstetric history on file.      reports being sexually active and has had partner(s) who are male. She reports using the following method of birth control/protection: I.U.D..  The following portions of the patient's history were reviewed and updated as appropriate: She  has a past medical history of ADHD (attention deficit hyperactivity disorder), Allergic (07/14/2021), Anxiety, Migraines, and Visual impairment.  She does not have any pertinent problems on file.  She  has a past surgical history that includes Eye surgery.  Her family history is not on file.  She  reports that she has never smoked. She  has never used smokeless tobacco. She reports that she does not currently use alcohol. She reports that she does not use drugs.  Current Outpatient Medications   Medication Sig Dispense Refill   • amitriptyline (ELAVIL) 10 MG tablet Take 1 tablet by mouth Every Night. 30 tablet 4   • azelastine (ASTELIN) 0.1 % nasal spray      • busPIRone (BUSPAR) 5 MG tablet Take 1 tablet by mouth 3 (Three) Times a Day As Needed (anxiety). 90 tablet 3   • EPINEPHrine (EPIPEN) 0.3 MG/0.3ML solution auto-injector injection Auvi-Q 0.3 mg/0.3 mL injection, auto-injector   Take 1 auto as needed by injection route.     • Gentamicin Sulfate (jose's solution for nasal irrigation) into the nostril(s) as directed by provider.     • levonorgestrel (Mirena, 52 MG,) 20 MCG/24HR IUD      • lisdexamfetamine (Vyvanse) 20 MG capsule Take 1 capsule by mouth Every Morning 30 capsule 0   • pseudoephedrine-guaifenesin (MUCINEX D)  MG per 12 hr tablet Mucinex D 60 mg-600 mg tablet,extended release   1 po daily with a full glass of water x7 days then prn       No current facility-administered medications for this visit.       Review of Systems  Do you have pain that bothers you in your daily life? no    Review of Systems   Constitutional: Negative for appetite change, fatigue and fever.   HENT: Negative for congestion, sore throat and trouble swallowing.    Eyes: Negative for blurred vision and visual disturbance.   Respiratory: Negative for cough, shortness of breath and wheezing.    Cardiovascular: Negative for chest pain, palpitations and leg swelling.   Gastrointestinal: Negative for abdominal pain, blood in stool, constipation, diarrhea and nausea.   Endocrine: Negative for polydipsia, polyphagia and polyuria.   Genitourinary: Negative for dysuria.   Musculoskeletal: Negative for arthralgias, back pain and myalgias.   Skin: Negative for rash.   Neurological: Positive for headache (once monthly with menses). Negative for dizziness, weakness  "and light-headedness.   Psychiatric/Behavioral: Negative for sleep disturbance and depressed mood. The patient is nervous/anxious (intermittent).          Objective   /84   Pulse 90   Temp 97.1 °F (36.2 °C)   Ht 177.8 cm (70\")   Wt 105 kg (231 lb)   SpO2 97%   BMI 33.15 kg/m²     Physical Exam  Vitals and nursing note reviewed.   Constitutional:       General: She is not in acute distress.     Appearance: Normal appearance. She is obese.   HENT:      Right Ear: Tympanic membrane and ear canal normal.      Left Ear: Tympanic membrane and ear canal normal.      Nose: Nose normal.      Mouth/Throat:      Mouth: Mucous membranes are moist.      Pharynx: Oropharynx is clear. No posterior oropharyngeal erythema.   Eyes:      Extraocular Movements: Extraocular movements intact.      Pupils: Pupils are equal, round, and reactive to light.   Neck:      Thyroid: No thyroid mass or thyromegaly.      Vascular: No carotid bruit.   Cardiovascular:      Rate and Rhythm: Normal rate and regular rhythm.      Pulses: Normal pulses.      Heart sounds: Normal heart sounds. No murmur heard.  Pulmonary:      Effort: Pulmonary effort is normal.      Breath sounds: Normal breath sounds. No wheezing.   Abdominal:      General: Bowel sounds are normal. There is no distension.      Palpations: Abdomen is soft. There is no mass.      Tenderness: There is no abdominal tenderness.   Musculoskeletal:         General: No deformity. Normal range of motion.      Cervical back: Normal range of motion and neck supple. No muscular tenderness.   Lymphadenopathy:      Head:      Right side of head: No submandibular, tonsillar, preauricular or posterior auricular adenopathy.      Left side of head: No submandibular, tonsillar, preauricular or posterior auricular adenopathy.      Cervical: No cervical adenopathy.   Skin:     General: Skin is warm and dry.      Capillary Refill: Capillary refill takes less than 2 seconds.      Findings: No " bruising or rash.   Neurological:      General: No focal deficit present.      Mental Status: She is alert and oriented to person, place, and time.      Gait: Gait normal.      Deep Tendon Reflexes: Reflexes normal.   Psychiatric:         Mood and Affect: Mood normal.         Behavior: Behavior normal.        Assessment & Plan   Healthy female exam.    Diagnosis Plan   1. Encounter for annual physical exam  Preventative maintenance discussed during visit and information provided in AVS. Monthly self breast exams, screening pap smear every 3 years- currently UTD, and immunizations encouraged .      2. Anxiety  CBC No Differential    Comprehensive metabolic panel    Lipid panel    Stable, continue amitriptyline with as needed buspirone. Refills provided. Continue counseling, healthy diet, exercise, daily sun exposure, talking with supportive family and friends, and medication compliance.       3. Migraine without aura and without status migrainosus, not intractable  CBC No Differential    Comprehensive metabolic panel    Lipid panel    Stable. Continue amitriptyline for prevention, avoid triggers, sleep hygiene, stress management, and adequate hydration encouraged.       4. Class 1 obesity due to excess calories without serious comorbidity with body mass index (BMI) of 33.0 to 33.9 in adult  CBC No Differential    Comprehensive metabolic panel    Lipid panel    BMI is >= 30 and <35. (Class 1 Obesity). The following options were offered after discussion;: exercise counseling/recommendations and nutrition counseling/recommendations        5. Attention deficit hyperactivity disorder (ADHD), predominantly inattentive type  Stable, managed by psych- keep follow-up. No bothersome side effects from medication regimen currently.       6. Immunization counseling  We discussed the risks and benefits of Flulaval (Flu 6+months-64 yrs) and COVID-19.     Counseling was given to inform of the potential signs and symptoms of adverse  effects and when to seek medical attention.      The CDC Vaccination Information Sheet (VIS) was given for review prior to administration.  A copy of the VIS was also offered.          7. Elevated LDL cholesterol level  CBC No Differential    Comprehensive metabolic panel    Lipid panel    Update lipid panel. Low cholesterol diet encouraged.         2. Patient Counseling:  --Nutrition: Stressed importance of moderation in sodium/caffeine intake, saturated fat and cholesterol, caloric balance, sufficient intake of fresh fruits, vegetables, fiber, calcium, iron, and 1 g folate supplementation if of childbearing age.   --Discussed the issue of calcium supplement, and the daily use of baby aspirin if applicable.             --Mammogram recommended every 2 years from age 40-49 and yearly beginning at age 50.  --Exercise: Stressed the importance of regular exercise.   --Substance Abuse: Discussed cessation/primary prevention of tobacco (if applicable), alcohol, or other drug use (if applicable); driving or other dangerous activities under the influence; availability of treatment for abuse.    --Sexuality: Discussed sexually transmitted diseases, partner selection, use of condoms, avoidance of unintended pregnancy  and contraceptive alternatives.   --Injury prevention: Discussed safety belts, safety helmets, smoke detector, smoking near bedding or upholstery.   --Dental health: Discussed importance of regular tooth brushing, flossing, and dental visits every 6 months.  --Immunizations reviewed.  --After hours service discussed with patient  3. Discussed the patient's BMI with her.  The BMI is above average; BMI management plan is completed  4. Return in about 1 year (around 1/12/2024) for Annual.     KARIN Slater  01/12/2023  08:25 EST

## 2023-01-18 ENCOUNTER — PRIOR AUTHORIZATION (OUTPATIENT)
Dept: BEHAVIORAL HEALTH | Facility: CLINIC | Age: 28
End: 2023-01-18
Payer: COMMERCIAL

## 2023-01-18 ENCOUNTER — TELEPHONE (OUTPATIENT)
Dept: FAMILY MEDICINE CLINIC | Facility: CLINIC | Age: 28
End: 2023-01-18
Payer: COMMERCIAL

## 2023-01-18 DIAGNOSIS — F90.0 ATTENTION DEFICIT HYPERACTIVITY DISORDER (ADHD), INATTENTIVE TYPE, MILD: ICD-10-CM

## 2023-01-18 DIAGNOSIS — F41.1 GENERALIZED ANXIETY DISORDER: ICD-10-CM

## 2023-01-18 DIAGNOSIS — F33.0 MILD EPISODE OF RECURRENT MAJOR DEPRESSIVE DISORDER: ICD-10-CM

## 2023-01-18 NOTE — TELEPHONE ENCOUNTER
Patient called in to get sienna or candelario to give  a verbal to the pharmacy the pharmacy said they would take a verbal so she could get her medicine. If someone could give her a call to let her know if this can be done. Thank you.

## 2023-01-23 NOTE — TELEPHONE ENCOUNTER
boarding pass received a request for coverage of Vyvanse for you. This is the initial  adverse determination for this request. The request was denied because:  You do not meet the requirements of your plan.  Your plan covers this drug when your doctor provides documentation that you meet any of  these conditions:  - You have a clinical condition for which there is no appropriate formulary alternative  - You need a form of the drug that is not on the formulary  - You tried the required number of preferred formulary alternatives on your formulary first.  These drugs did not work for you or you cannot take them.  Your request has been denied based on the information we have.  Formulary alternative(s) are amphetamine-dextroamphetamine, amphetaminedextroamphetamine extended-release, atomoxetine, dexmethylphenidate,  dexmethylphenidate extended-release, methylphenidate extended-release capsule,  dextroamphetamine, dextroamphetamine extended-release, guanfacine extendedrelease, methylphenidate, methylphenidate extended-release osmotic tablet (generic  Concerta), methylphenidate extended-release capsule  . Requirement: 3 in a class with 3 or more alternatives, 2 in a class with 2 alternatives, or  1 in a class with only 1 alternative. Please refer to your plan documents for a complete list  of alternatives.  Note: Formulary alternatives may require a prior authorization. Your prescriber will be  responsible for determining what alternative is appropriate for you.  If you need assistance determining what alternative benefits, services or supplies may be  covered under your plan, please call the toll-free Customer Care number on your benefit  ID card or you may refer to your benefit plan materials.  This document contains references to brand-name prescription drugs that are trademarks or registered trademarks of pharmaceutical  manufacturers not affiliated with [employer].  Your privacy is important to us. Our employees are  trained regarding the appropriate way to handle your private health information.  [id number] TDD/TTY: [phone]  You may ask for a free copy of the actual benefit provision, guideline, protocol or othe

## 2023-01-31 DIAGNOSIS — F41.1 GENERALIZED ANXIETY DISORDER: ICD-10-CM

## 2023-01-31 DIAGNOSIS — F90.0 ATTENTION DEFICIT HYPERACTIVITY DISORDER (ADHD), INATTENTIVE TYPE, MILD: ICD-10-CM

## 2023-01-31 DIAGNOSIS — F33.0 MILD EPISODE OF RECURRENT MAJOR DEPRESSIVE DISORDER: ICD-10-CM

## 2023-01-31 NOTE — TELEPHONE ENCOUNTER
Incoming Refill Request      [facility]    [facility]    Additional details provided by patient: patient states insurance denied it and she only has 4 days left. Can this be appealed or changed to somethine else.    Best call back number: [phone]    Does the patient have less than a 3 day supply:  [] Yes  [x] No    Trish May  01/31/23, 08:33 EST

## 2023-02-02 NOTE — TELEPHONE ENCOUNTER
PT CALLED IN ABOUT HER VYVANSE PRESCRIPTION AND SHE SAID THAT SHE RECEIVED A LETTER IN THE MAIL THAT THE PA WAS DENIED AND SHE IS WANTING TO KNOW WHAT COULD BE DONE TO GET THIS APPROVED.     SHE ALSO ONLY HAS ONE DAY LEFT OF THE MEDICATION.    CONTACT -[phone]   Giancarlo Avila

## 2023-02-02 NOTE — TELEPHONE ENCOUNTER
I SPOKE TO PT AND SHE SAID THAT SHE WOULD PAY OUT OF POCKET AND REQUESTED A REFILL. [facility] CALLED ABOUT 10 MINS LATER REQUESTING APPEAL BE SUBMITTED FOR THIS PT AND GAVE HE GAVE ME THE NUMBER. I ADVISED HIM THAT IT MAY BE MONDAY DUE TO US BEING IN CLINIC AND OUT ON Friday. HE NOTIFIED PT OF THIS INFO.    APPEAL# [id number]

## 2023-02-07 DIAGNOSIS — F33.0 MILD EPISODE OF RECURRENT MAJOR DEPRESSIVE DISORDER: ICD-10-CM

## 2023-02-07 DIAGNOSIS — F90.0 ATTENTION DEFICIT HYPERACTIVITY DISORDER (ADHD), INATTENTIVE TYPE, MILD: ICD-10-CM

## 2023-02-07 DIAGNOSIS — F41.1 GENERALIZED ANXIETY DISORDER: ICD-10-CM

## 2023-02-07 NOTE — TELEPHONE ENCOUNTER
Incoming Refill Request      Medication requested (name and dose):  Baitianshi     Pharmacy where request should be sent: [facility]    Additional details provided by patient: Patient would like to know that status of PA for Baitianshi    Best call back number:  Verified in chart    Does the patient have less than a 3 day supply:  [x] Yes  [] No    Bea Thomas  02/07/23, 11:35 EST

## 2023-02-09 NOTE — TELEPHONE ENCOUNTER
I was instructed to fax a letter of medical necessity to [phone]. I faxed letter and received conf.

## 2023-03-07 ENCOUNTER — OFFICE VISIT (OUTPATIENT)
Dept: OBSTETRICS AND GYNECOLOGY | Facility: CLINIC | Age: 28
End: 2023-03-07
Payer: COMMERCIAL

## 2023-03-07 VITALS
WEIGHT: 229 LBS | DIASTOLIC BLOOD PRESSURE: 76 MMHG | BODY MASS INDEX: 32.78 KG/M2 | HEIGHT: 70 IN | SYSTOLIC BLOOD PRESSURE: 110 MMHG

## 2023-03-07 DIAGNOSIS — Z31.69 ENCOUNTER FOR PRECONCEPTION CONSULTATION: ICD-10-CM

## 2023-03-07 DIAGNOSIS — Z01.419 ENCOUNTER FOR GYNECOLOGICAL EXAMINATION WITHOUT ABNORMAL FINDING: Primary | ICD-10-CM

## 2023-03-07 PROCEDURE — 99385 PREV VISIT NEW AGE 18-39: CPT | Performed by: STUDENT IN AN ORGANIZED HEALTH CARE EDUCATION/TRAINING PROGRAM

## 2023-03-07 NOTE — PROGRESS NOTES
Annual Well Woman Visit    Subjective   Chief Complaint   Patient presents with   • Annual Exam     No PAP, consult IUD removal.      Conchita SOTO is a 27 y.o. No obstetric history on file. presenting to be seen for an annual well woman visit. She is considering trying to conceive in the near future and would like to discuss IUD removal and other pre-conception counseling. She has had her IUD in place 4.5 years. She previously was amenorrheic with it in place, however for the past 6 months she has been having menses that are heavier, requiring a pad. Prior to her IUD, she had very heavy and painful periods. She denies sexual dysfunction. She denies urinary complaints including incontinence.    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 No pertinent family history.  Social History     Tobacco Use   • Smoking status: Never   • Smokeless tobacco: Never   Vaping Use   • Vaping Use: Never used   Substance Use Topics   • Alcohol use: Not Currently     Comment: occasionly 1-2 drinks/week   • Drug use: Never     (Not in a hospital admission)    Patient has no known allergies.  Current Outpatient Medications on File Prior to Visit   Medication Sig Dispense Refill   • amitriptyline (ELAVIL) 10 MG tablet Take 1 tablet by mouth Every Night. 30 tablet 12   • azelastine (ASTELIN) 0.1 % nasal spray      • busPIRone (BUSPAR) 5 MG tablet Take 1 tablet by mouth 3 (Three) Times a Day As Needed (anxiety). 90 tablet 3   • EPINEPHrine (EPIPEN) 0.3 MG/0.3ML solution  "auto-injector injection Auvi-Q 0.3 mg/0.3 mL injection, auto-injector   Take 1 auto as needed by injection route.     • Gentamicin Sulfate (jose's solution for nasal irrigation) into the nostril(s) as directed by provider.     • levonorgestrel (Mirena, 52 MG,) 20 MCG/24HR IUD      • lisdexamfetamine (Vyvanse) 20 MG capsule Take 1 capsule by mouth Every Morning 30 capsule 0   • pseudoephedrine-guaifenesin (MUCINEX D)  MG per 12 hr tablet Mucinex D 60 mg-600 mg tablet,extended release   1 po daily with a full glass of water x7 days then prn       No current facility-administered medications on file prior to visit.     Social History    Tobacco Use      Smoking status: Never      Smokeless tobacco: Never    Review of Systems  Pertinent items are noted in HPI, all other systems were reviewed and negative       Objective   /76   Ht 177.8 cm (70\")   Wt 104 kg (229 lb)   BMI 32.86 kg/m²     Physical Exam:  General Appearance: alert, interactive, and cooperative  Breasts:  Examined in supine position  Fibrocystic changes noted in right lateral breast, otherwise symmetric without masses or skin dimpling  Nipples normal without inversion, lesions or discharge  There are no palpable axillary nodes  Abdomen: no masses, soft, non-tender, no guarding and no rebound tenderness  Pelvis:  Pelvic: Clinical staff was present for exam  External genitalia:  normal appearance of the external genitalia including Bartholin's and Verdel's glands.  :  urethral meatus normal;  Vagina:  normal pink mucosa without prolapse or lesions, small dark brown discharge present  Cervix:  normal appearance, IUD strings present  Uterus:  normal size, shape and consistency.  Adnexa:  normal bimanual exam of the adnexa.  Rectal:  digital rectal exam not performed; anus visually normal appearing       Assessment & Plan    Annual well woman exam with age appropriate screening      Diagnosis Plan   1. Encounter for gynecological examination " without abnormal finding        2. Encounter for preconception consultation           Medications ordered: None    Procedures performed: None    - Mammogram: Not indicated  - Pap screening guidelines reviewed; pap smear up to date. Next due 12/2024.  - Yearly clinical breast and pelvic exams recommended regardless of pap recommendations  - Healthy diet and exercise encouraged  - Contraception: IUD. Soo will schedule for removal when ready to start trying to conceive.  - Preconception counseling: We discussed Soo's current medications. The two potential medications of concern include amitriptyline and lisdexamfetamine (vyvanse). We discussed that amitriptyline can be used for migraine prevention in pregnancy, though is not considered first line. Studies have shown an increased risk of pre-eclampsia and JAY, though they do not indicate an increased risk of teratogenicity or PTB.    Regarding Vyvanse - there is very limited data on the use of ADHD medication in pregnancy, and most data is abstracted from outcomes of pregnancies in which recreational use of methamphetamine was present. With recreational use of methamphetamine, there is an increased risk of SGA, PTD and JAY and potential increased risk of behavioral issues in childhood. We reviewed that these conclusions are based on literature review, however this was not an exhaustive literature review and with both medications, there remains a potential for future research to reveal currently unknown risks associated with the use of these medications in pregnancy.    Alternative first line migraine prevention medications include propranolol, metoprolol, atenolol, verapamil or cyproheptadine.    Finally, Soo reports a personal and family history of congenital bilateral lid entropion. While congenital entropion does appear to have a genetic component, I am not aware of any preconception testing that could be done to evaluate Soo's risk or her children's risk.     We reviewed recommendations for timed intercourse, use of OPKs and the importance of starting a PNV. All questions were answered.    Follow up as needed for IUD removal or annual exam.    Poonam Jackman MD  Obstetrics and Gynecology  [facility]

## 2023-03-10 ENCOUNTER — PATIENT ROUNDING (BHMG ONLY) (OUTPATIENT)
Dept: OBSTETRICS AND GYNECOLOGY | Facility: CLINIC | Age: 28
End: 2023-03-10
Payer: COMMERCIAL

## 2023-03-10 NOTE — PROGRESS NOTES
March 10, 2023    Hello, may I speak with Conchita SOTO?    My name is Elizabeth Lobo  I am  with [facility]  [address], [address]  [facility] [address] [phone].    Before we get started may I verify your date of birth? 1995    I am calling to officially welcome you to our practice and ask about your recent visit. Is this a good time to talk? Yes     Tell me about your visit with us. What things went well?  Dr. Jackman was wonderful!  I was very pleased.     We're always looking for ways to make our patients' experiences even better. Do you have recommendations on ways we may improve?  No    Overall were you satisfied with your first visit to our practice? Yes       I appreciate you taking the time to speak with me today. Is there anything else I can do for you? No      Thank you, and have a great day.

## 2023-08-03 ENCOUNTER — OFFICE VISIT (OUTPATIENT)
Dept: OBSTETRICS AND GYNECOLOGY | Facility: CLINIC | Age: 28
End: 2023-08-03
Payer: COMMERCIAL

## 2023-08-03 VITALS
SYSTOLIC BLOOD PRESSURE: 118 MMHG | BODY MASS INDEX: 32.35 KG/M2 | WEIGHT: 226 LBS | HEIGHT: 70 IN | DIASTOLIC BLOOD PRESSURE: 82 MMHG

## 2023-08-03 DIAGNOSIS — Z30.432 ENCOUNTER FOR IUD REMOVAL: Primary | ICD-10-CM

## 2023-08-28 ENCOUNTER — TELEPHONE (OUTPATIENT)
Dept: OBSTETRICS AND GYNECOLOGY | Facility: CLINIC | Age: 28
End: 2023-08-28
Payer: COMMERCIAL

## 2023-08-28 NOTE — TELEPHONE ENCOUNTER
Provider:DR. KNOX     Caller: AINSLEY SOTO   Relationship to Patient: SELF   Phone Number: [phone]    Reason for Call: NOB SCHEDULED 10/13 + PREG TEST LMP: 07/28, PT RECENTLY DISCUSSED WITH DR. KNOX AT LAST VISIT ABOUT HER migraineS  SHE WOULD LIKE TO KNOW NOW SHE IS PREGNANT IS TYLENOL EXTRA STRENGTH OK TO TAKE IF NEEDED, PLEASE ADVISE THANK YOU     When was the patient last seen: 08/03  When did it start: NA  Where is it located: NAN  Characteristics of symptom/severity: NA  Timing- Is it constant or intermittent: NAN  What makes it worse: NAN  What makes it better: NA  What therapies/medications have you tried: NA    OK TO CALLBACK ANYTIME, OK TO LEAVE A VM, PT OK MYCHART MESSAGE AS WELL, THANK YOU

## 2023-09-05 ENCOUNTER — TELEPHONE (OUTPATIENT)
Dept: OBSTETRICS AND GYNECOLOGY | Facility: CLINIC | Age: 28
End: 2023-09-05
Payer: COMMERCIAL

## 2023-09-05 ENCOUNTER — LAB (OUTPATIENT)
Dept: LAB | Facility: HOSPITAL | Age: 28
End: 2023-09-05
Payer: COMMERCIAL

## 2023-09-05 DIAGNOSIS — Z32.00 POSSIBLE PREGNANCY, NOT CONFIRMED: Primary | ICD-10-CM

## 2023-09-05 DIAGNOSIS — Z32.00 POSSIBLE PREGNANCY, NOT CONFIRMED: ICD-10-CM

## 2023-09-05 LAB — HCG INTACT+B SERPL-ACNC: 2.4 MIU/ML

## 2023-09-05 PROCEDURE — 84702 CHORIONIC GONADOTROPIN TEST: CPT

## 2023-09-05 NOTE — TELEPHONE ENCOUNTER
"Caller: Conchita SOTO \"Soo\"    Relationship to patient: Self    Best call back number: [phone]    Patient is needing: PT HAD + PREG TEST- SCHEDULED 10/13/23 FOR NEW OB. PT EST W/ OFFICE. STARTED BLEEDING FRIDAY EVENING. TOOK PREG TEST THIS A.M HAS NEGATIVE RESULTS. PATIENT WOULD LIKE A FOLLOW UP CALL FROM CLINICAL TEAM ON WHAT TO DO NEXT.     "

## 2024-01-06 ENCOUNTER — APPOINTMENT (OUTPATIENT)
Dept: ULTRASOUND IMAGING | Facility: HOSPITAL | Age: 29
End: 2024-01-06
Payer: COMMERCIAL

## 2024-01-06 ENCOUNTER — HOSPITAL ENCOUNTER (EMERGENCY)
Facility: HOSPITAL | Age: 29
Discharge: HOME | End: 2024-01-07
Attending: EMERGENCY MEDICINE
Payer: COMMERCIAL

## 2024-01-06 DIAGNOSIS — O20.0 THREATENED MISCARRIAGE IN EARLY PREGNANCY: Primary | ICD-10-CM

## 2024-01-06 LAB
ABO GROUP BLD: NORMAL
B-HCG UR QL: POSITIVE
BACTERIA UR QL AUTO: ABNORMAL /HPF
BILIRUB UR QL STRIP: NEGATIVE
CLARITY UR: CLEAR
COLOR UR: YELLOW
GLUCOSE UR STRIP-MCNC: NEGATIVE MG/DL
HCG INTACT+B SERPL-ACNC: 7747 MIU/ML
HGB UR QL STRIP.AUTO: ABNORMAL
HOLD SPECIMEN: NORMAL
HOLD SPECIMEN: NORMAL
HYALINE CASTS UR QL AUTO: ABNORMAL /LPF
KETONES UR QL STRIP: NEGATIVE
LEUKOCYTE ESTERASE UR QL STRIP.AUTO: NEGATIVE
NITRITE UR QL STRIP: NEGATIVE
PH UR STRIP.AUTO: 6.5 [PH] (ref 5–8)
PROT UR QL STRIP: NEGATIVE
RBC # UR STRIP: ABNORMAL /HPF
REF LAB TEST METHOD: ABNORMAL
RH BLD: POSITIVE
SP GR UR STRIP: 1.01 (ref 1–1.03)
SQUAMOUS #/AREA URNS HPF: ABNORMAL /HPF
UROBILINOGEN UR QL STRIP: ABNORMAL
WBC # UR STRIP: ABNORMAL /HPF
WHOLE BLOOD HOLD COAG: NORMAL
WHOLE BLOOD HOLD SPECIMEN: NORMAL

## 2024-01-06 PROCEDURE — 81025 URINE PREGNANCY TEST: CPT | Performed by: EMERGENCY MEDICINE

## 2024-01-06 PROCEDURE — 99284 EMERGENCY DEPT VISIT MOD MDM: CPT

## 2024-01-06 PROCEDURE — 86901 BLOOD TYPING SEROLOGIC RH(D): CPT | Performed by: NURSE PRACTITIONER

## 2024-01-06 PROCEDURE — 86900 BLOOD TYPING SEROLOGIC ABO: CPT | Performed by: NURSE PRACTITIONER

## 2024-01-06 PROCEDURE — 84702 CHORIONIC GONADOTROPIN TEST: CPT | Performed by: NURSE PRACTITIONER

## 2024-01-06 PROCEDURE — 76817 TRANSVAGINAL US OBSTETRIC: CPT

## 2024-01-06 PROCEDURE — 81001 URINALYSIS AUTO W/SCOPE: CPT | Performed by: EMERGENCY MEDICINE

## 2024-01-06 RX ORDER — SODIUM CHLORIDE 0.9 % (FLUSH) 0.9 %
10 SYRINGE (ML) INJECTION AS NEEDED
Status: DISCONTINUED | OUTPATIENT
Start: 2024-01-06 | End: 2024-01-07 | Stop reason: HOSPADM

## 2024-01-07 VITALS
HEART RATE: 85 BPM | RESPIRATION RATE: 18 BRPM | BODY MASS INDEX: 33.64 KG/M2 | HEIGHT: 70 IN | TEMPERATURE: 98.9 F | DIASTOLIC BLOOD PRESSURE: 74 MMHG | WEIGHT: 235 LBS | SYSTOLIC BLOOD PRESSURE: 120 MMHG | OXYGEN SATURATION: 99 %

## 2024-01-07 NOTE — ED PROVIDER NOTES
Subjective  History of Present Illness:    Chief Complaint: Vaginal bleeding while 9 weeks pregnant  History of Present Illness: This is a 28-year-old female patient comes into the ED accompanied by her  with complaints of vaginal bleeding with clots over the last 2 to 3 days while being approximately 9 weeks pregnant.      Nurses Notes reviewed and agree, including vitals, allergies, social history and prior medical history.       Allergies:    Patient has no known allergies.      Past Surgical History:   Procedure Laterality Date    EYE SURGERY      several wh she was a child     WISDOM TOOTH EXTRACTION           Social History     Socioeconomic History    Marital status:    Tobacco Use    Smoking status: Never    Smokeless tobacco: Never   Vaping Use    Vaping Use: Never used   Substance and Sexual Activity    Alcohol use: Not Currently     Comment: occasionly 1-2 drinks/week    Drug use: Never    Sexual activity: Yes     Partners: Male     Birth control/protection: I.U.D.         History reviewed. No pertinent family history.    REVIEW OF SYSTEMS: All systems reviewed and not pertinent unless noted.    Review of Systems   Gastrointestinal:  Positive for abdominal pain.   Genitourinary:  Positive for vaginal bleeding.   All other systems reviewed and are negative.      Objective    Physical Exam  Vitals and nursing note reviewed.   Constitutional:       Appearance: Normal appearance.   HENT:      Head: Normocephalic and atraumatic.   Eyes:      Extraocular Movements: Extraocular movements intact.      Pupils: Pupils are equal, round, and reactive to light.   Cardiovascular:      Rate and Rhythm: Normal rate and regular rhythm.      Pulses: Normal pulses.      Heart sounds: Normal heart sounds.   Pulmonary:      Effort: Pulmonary effort is normal.      Breath sounds: Normal breath sounds.   Abdominal:      General: Abdomen is flat. Bowel sounds are normal.      Palpations: Abdomen is soft.    Musculoskeletal:      Cervical back: Normal range of motion and neck supple.   Skin:     Capillary Refill: Capillary refill takes less than 2 seconds.   Neurological:      General: No focal deficit present.      Mental Status: She is alert and oriented to person, place, and time. Mental status is at baseline.      GCS: GCS eye subscore is 4. GCS verbal subscore is 5. GCS motor subscore is 6.      Sensory: Sensation is intact.      Motor: Motor function is intact.      Gait: Gait is intact.   Psychiatric:         Attention and Perception: Attention and perception normal.         Mood and Affect: Mood and affect normal.         Speech: Speech normal.         Behavior: Behavior normal. Behavior is cooperative.           Procedures    ED Course:         Lab Results (last 24 hours)       Procedure Component Value Units Date/Time    Pregnancy, Urine - Urine, Clean Catch [[id number]]  (Abnormal) Collected: 01/06/24 2050    Specimen: Urine, Clean Catch Updated: 01/06/24 2104     HCG, Urine QL Positive    Urinalysis With Microscopic If Indicated (No Culture) - Urine, Clean Catch [[id number]]  (Abnormal) Collected: 01/06/24 2050    Specimen: Urine, Clean Catch Updated: 01/06/24 2105     Color, UA Yellow     Appearance, UA Clear     pH, UA 6.5     Specific Gravity, UA 1.008     Glucose, UA Negative     Ketones, UA Negative     Bilirubin, UA Negative     Blood, UA Large (3+)     Protein, UA Negative     Leuk Esterase, UA Negative     Nitrite, UA Negative     Urobilinogen, UA 0.2 E.U./dL    Urinalysis, Microscopic Only - Urine, Clean Catch [[id number]]  (Abnormal) Collected: 01/06/24 2050    Specimen: Urine, Clean Catch Updated: 01/06/24 2128     RBC, UA 0-2 /HPF      WBC, UA 3-5 /HPF      Bacteria, UA 2+ /HPF      Squamous Epithelial Cells, UA 0-2 /HPF      Hyaline Casts, UA None Seen /LPF      Methodology Manual Light Microscopy    hCG, Quantitative, Pregnancy [[id number]] Collected: 01/06/24 2140    Specimen: Blood Updated:  01/06/24 2228     HCG Quantitative 7,747.00 mIU/mL     Narrative:      HCG Ranges by Gestational Age    Females - non-pregnant premenopausal   </= 1mIU/mL HCG  Females - postmenopausal               </= 7mIU/mL HCG    3 Weeks         5.8 -    71.2 mIU/mL  4 Weeks         9.5 -     750 mIU/mL  5 Weeks         217 -   7,138 mIU/mL  6 Weeks         158 -  31,795 mIU/mL  7 Weeks       3,697 - 163,563 mIU/mL  8 Weeks      32,065 - 149,571 mIU/mL  9 Weeks      63,803 - 151,410 mIU/mL  10 Weeks     46,509 - 186,977 mIU/mL  12 Weeks     27,832 - 210,612 mIU/mL  14 Weeks     13,950 -  62,530 mIU/mL  15 Weeks     12,039 -  70,971 mIU/mL  16 Weeks      9,040 -  56,451 mIU/mL  17 Weeks      8,175 -  55,868 mIU/mL  18 Weeks      8,099 -  58,176 mIU/mL             US Ob Transvaginal    Result Date: 1/6/2024  FINAL REPORT TECHNIQUE: Sonographic images of the pelvis were obtained transvaginally. CLINICAL HISTORY: 9 weeks pregnant with vaginal bleeding FINDINGS: There is an irregular appearing intrauterine gestational sac containing some internal debris.  A fetal pole is not present. The mean sac diameter of 1.7 cm is compatible with a gestational age of 6 weeks, 5 days.  There are no suspicious adnexal lesions.     Impression: Findings are concerning for ongoing miscarriage. Authenticated and Electronically Signed by Yuri Ortez MD on 01/06/2024 11:54:06 PM      Medical Decision Making  This is a 28-year-old female patient comes into the ED accompanied by her  with complaints of vaginal bleeding with clots over the last 2 to 3 days while being approximately 9 weeks pregnant.    DDX: includes but is not limited to: Vaginal bleeding, urinary tract infection, threatened miscarriage, other    Problems Addressed:  Threatened miscarriage in early pregnancy: complicated acute illness or injury    Amount and/or Complexity of Data Reviewed  Labs: ordered. Decision-making details documented in ED Course.     Details: I have  personally reviewed and documented all results  Radiology: ordered. Decision-making details documented in ED Course.     Details: I have personally reviewed and documented all results  Discussion of management or test interpretation with external provider(s): Discussed assessment, treatment and plan with ER attending    Risk  Prescription drug management.  Risk Details: I have discussed with patient the finding of the test preformed today. Patient has been diagnosed with threatened miscarriage in early pregnancy and will be discharged home.  Patient requested to follow-up with primary care provider, OB/GYN within the next 7 days for reevaluation. Strict return precautions have been given and patient verbalizes understanding                  Final diagnoses:   Threatened miscarriage in early pregnancy          Stefan Caruso, APRN  01/06/24 0157

## 2024-01-07 NOTE — ED NOTES
Pt states she has been having spotting for the past 4 days that is accompanied by intermittent pelvic cramps. Pt states she is 9wks pregnant

## 2024-01-07 NOTE — DISCHARGE INSTRUCTIONS
Patient to follow-up with OB/GYN in 3 days for repeat of her hCG quantitative lab and reevaluation for threatened miscarriage

## 2024-01-08 ENCOUNTER — TELEPHONE (OUTPATIENT)
Dept: OBSTETRICS AND GYNECOLOGY | Facility: CLINIC | Age: 29
End: 2024-01-08
Payer: COMMERCIAL

## 2024-01-08 ENCOUNTER — LAB (OUTPATIENT)
Dept: LAB | Facility: HOSPITAL | Age: 29
End: 2024-01-08
Payer: COMMERCIAL

## 2024-01-08 DIAGNOSIS — O36.80X0 PREGNANCY WITH UNCERTAIN FETAL VIABILITY, SINGLE OR UNSPECIFIED FETUS: Primary | ICD-10-CM

## 2024-01-08 DIAGNOSIS — O36.80X0 PREGNANCY WITH UNCERTAIN FETAL VIABILITY, SINGLE OR UNSPECIFIED FETUS: ICD-10-CM

## 2024-01-08 LAB — HCG INTACT+B SERPL-ACNC: 5058 MIU/ML

## 2024-01-08 PROCEDURE — 84702 CHORIONIC GONADOTROPIN TEST: CPT

## 2024-01-08 NOTE — TELEPHONE ENCOUNTER
Patient seen in ER on 1/6/2024 for threatened miscarriage. They advised her to call office today for repeat HCG level. Order placed and patient advised to complete lab today.

## 2024-01-10 ENCOUNTER — OFFICE VISIT (OUTPATIENT)
Dept: OBSTETRICS AND GYNECOLOGY | Facility: CLINIC | Age: 29
End: 2024-01-10
Payer: COMMERCIAL

## 2024-01-10 ENCOUNTER — PREP FOR SURGERY (OUTPATIENT)
Dept: OTHER | Facility: HOSPITAL | Age: 29
End: 2024-01-10
Payer: COMMERCIAL

## 2024-01-10 VITALS
WEIGHT: 220 LBS | DIASTOLIC BLOOD PRESSURE: 70 MMHG | HEIGHT: 70 IN | SYSTOLIC BLOOD PRESSURE: 120 MMHG | BODY MASS INDEX: 31.5 KG/M2

## 2024-01-10 DIAGNOSIS — O36.80X0 PREGNANCY WITH UNCERTAIN FETAL VIABILITY, SINGLE OR UNSPECIFIED FETUS: Primary | ICD-10-CM

## 2024-01-10 DIAGNOSIS — O02.1 MISSED ABORTION: ICD-10-CM

## 2024-01-10 DIAGNOSIS — O02.1 MISSED ABORTION: Primary | ICD-10-CM

## 2024-01-10 PROCEDURE — S0260 H&P FOR SURGERY: HCPCS | Performed by: OBSTETRICS & GYNECOLOGY

## 2024-01-10 RX ORDER — SODIUM CHLORIDE 0.9 % (FLUSH) 0.9 %
3 SYRINGE (ML) INJECTION EVERY 12 HOURS SCHEDULED
Status: CANCELLED | OUTPATIENT
Start: 2024-01-10

## 2024-01-10 RX ORDER — SODIUM CHLORIDE 0.9 % (FLUSH) 0.9 %
10 SYRINGE (ML) INJECTION AS NEEDED
Status: CANCELLED | OUTPATIENT
Start: 2024-01-10

## 2024-01-10 RX ORDER — SODIUM CHLORIDE 9 MG/ML
40 INJECTION, SOLUTION INTRAVENOUS AS NEEDED
Status: CANCELLED | OUTPATIENT
Start: 2024-01-10

## 2024-01-10 RX ORDER — PRENATAL VIT NO.126/IRON/FOLIC 28MG-0.8MG
TABLET ORAL DAILY
COMMUNITY

## 2024-01-10 NOTE — H&P
ARLEEN Hart  Conchita Perez  : 1995  MRN: [id number]  CSN: [id number]    History and Physical  Subjective   Conchita Perez is a 28 y.o. year old  who presents for surgery due to missed AB.  Patient was seen in the emergency room with complaints of vaginal bleeding.  She had a transvaginal ultrasound as well as hCG level.  She had repeat hCG level which declined.  A follow-up ultrasound in the office showed a collapsing gestational sac.  I discussed with the patient the various options for management of her symptoms.  She is here for further evaluation and treatment with suction D&C.    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     Current Outpatient Medications on File Prior to Encounter   Medication Sig Dispense Refill    prenatal vitamin (prenatal, CLASSIC, vitamin) tablet Take  by mouth Daily.      amitriptyline (ELAVIL) 10 MG tablet Take 1 tablet by mouth Every Night. (Patient not taking: Reported on 1/10/2024) 30 tablet 12    busPIRone (BUSPAR) 5 MG tablet Take 1 tablet by mouth 3 (Three) Times a Day As Needed (anxiety). (Patient not taking: Reported on 1/10/2024) 90 tablet 3    EPINEPHrine (EPIPEN) 0.3 MG/0.3ML solution auto-injector injection Auvi-Q 0.3 mg/0.3 mL injection, auto-injector   Take 1 auto as needed by injection route.       No Known Allergies  Past Surgical History:   Procedure Laterality Date    EYE SURGERY      several wh she was a child     WISDOM TOOTH EXTRACTION       History reviewed. No pertinent family history.  Social History     Socioeconomic History    Marital status:    Tobacco Use    Smoking status: Never    Smokeless tobacco: Never   Vaping Use    Vaping Use: Never used   Substance and Sexual Activity    Alcohol  use: Not Currently     Comment: occasionly 1-2 drinks/week    Drug use: Never    Sexual activity: Yes     Partners: Male     Birth control/protection: I.U.D.     Review of Systems  The following systems were reviewed and negative:  constitution, eyes, ENT, respiratory, cardiovascular, gastrointestinal, genitourinary, integument, breast, hematologic / lymphatic, musculoskeletal, neurological, behavioral/psych, endocrine, and allergies / immunologic    Objective  Recent Vitals         1/6/2024 1/7/2024 1/10/2024       BP: 130/59 120/74 120/70     Pulse: 84 85 --     Temp: 98.8 °F (37.1 °C) 98.9 °F (37.2 °C) --     Weight: 107 kg (235 lb) -- 99.8 kg (220 lb)     BMI (Calculated): 33.7 -- 31.6           Physical Exam:  General Appearance:  Alert and cooperative, not in any acute distress.   Head:  Atraumatic and normocephalic, without obvious abnormality.   Eyes:          PERRLA, conjunctivae and sclerae normal, no Icterus. No pallor. Extraocular movements are within normal limits.   Ears:  Ears appear intact with no abnormalities noted.   Throat: No oral lesions, no thrush, oral mucosa moist.   Neck: Supple, trachea midline, no thyromegaly, no carotid bruit.   Back:   No kyphoscoliosis present. No tenderness to palpation,   range of motion normal.  No CVAT.   Lungs:   Chest shape is normal. Breath sounds heard bilaterally equally.  No crackles or wheezing. No Pleural rub or bronchial breathing. Normal respiratory effort.   Heart:  Normal S1 and S2, no murmur, no gallop, no rub. No JVD.   Abdomen:   Normal bowel sounds, no masses, no hepatomegaly, no splenomegaly. Soft, non-tender, no guarding, no rebound tenderness.   Extremities: Moves all extremities well, no edema, no cyanosis, no clubbing.  Normal range of motion. Normal strength and tone.   Skin: No bleeding, bruising or rash. No palpable masses noted or induration.   Psychiatric: Alert and oriented  to time, place, and person. Appropriate mood and affect. Thought  content organized and appropriate judgment.       Recent Labs  Lab Results (last 7 days)       ** No results found for the last 168 hours. **             Recent Imaging  US Ob Transvaginal    Result Date: 1/6/2024  FINAL REPORT TECHNIQUE: Sonographic images of the pelvis were obtained transvaginally. CLINICAL HISTORY: 9 weeks pregnant with vaginal bleeding FINDINGS: There is an irregular appearing intrauterine gestational sac containing some internal debris.  A fetal pole is not present. The mean sac diameter of 1.7 cm is compatible with a gestational age of 6 weeks, 5 days.  There are no suspicious adnexal lesions.     Findings are concerning for ongoing miscarriage. Authenticated and Electronically Signed by Yuri Ortez MD on 01/06/2024 11:54:06 PM         Assessment   Missed AB     Plan   Suction D&C.  ABx and DVT prophylaxis as indicated.  Risks, complications, benefits, and other alternatives discussed.  All questions answered and pt in agreement with plan.    Jia Damico M.D.  1/10/2024  15:47 EST

## 2024-01-10 NOTE — PRE-PROCEDURE INSTRUCTIONS
PAT phone history completed with patient for upcoming procedure on 1/11/24 with Dr. Damico.    PAT PASS reviewed with patient and they verbalize understanding of the following:     Do not eat or drink anything after midnight the night before procedure unless otherwise instructed by physician/surgeon's office, this includes no gum, candy, mints, tobacco products or e-cigarettes.  Do not shave the area to be operated on at least 48 hours prior to procedure.  Do not wear makeup, lotion, hair products, or nail polish.  Do not wear any jewelry and remove all piercings.  Do not wear any adhesive if you wear dentures.  Do not wear contacts; bring in glasses if needed.  Only take medications on the morning of procedure as instructed by PAT nurse per anesthesia guidelines or as instructed by physician's office.  If you are on any blood thinners reach out to the physician/surgeon's office for instructions on when/if they will need to be stopped prior to procedure.  Bring in picture ID and insurance card, advanced directive copies if applicable, CPAP/BIPAP/Inhalers if indicated morning of procedure, leave any other valuables at home.  Ensure you have arranged for someone to drive you home the day of your procedure and someone to care for you at home afterwards. It is recommended that you do not drive, drink alcohol, or make any major legal decisions for at least 24 hours after your procedure is complete.    Instructions given on hospital entrance and registration location.

## 2024-01-11 ENCOUNTER — ANESTHESIA (OUTPATIENT)
Dept: PERIOP | Facility: HOSPITAL | Age: 29
End: 2024-01-11
Payer: COMMERCIAL

## 2024-01-11 ENCOUNTER — HOSPITAL ENCOUNTER (OUTPATIENT)
Facility: HOSPITAL | Age: 29
Setting detail: HOSPITAL OUTPATIENT SURGERY
Discharge: HOME | End: 2024-01-11
Attending: OBSTETRICS & GYNECOLOGY | Admitting: OBSTETRICS & GYNECOLOGY
Payer: COMMERCIAL

## 2024-01-11 ENCOUNTER — ANESTHESIA EVENT (OUTPATIENT)
Dept: PERIOP | Facility: HOSPITAL | Age: 29
End: 2024-01-11
Payer: COMMERCIAL

## 2024-01-11 VITALS
TEMPERATURE: 97 F | RESPIRATION RATE: 18 BRPM | HEART RATE: 93 BPM | SYSTOLIC BLOOD PRESSURE: 125 MMHG | DIASTOLIC BLOOD PRESSURE: 83 MMHG | OXYGEN SATURATION: 98 %

## 2024-01-11 DIAGNOSIS — O02.1 MISSED ABORTION: ICD-10-CM

## 2024-01-11 LAB
BACTERIA UR QL AUTO: ABNORMAL /HPF
BASOPHILS # BLD AUTO: 0.04 10*3/MM3 (ref 0–0.2)
BASOPHILS NFR BLD AUTO: 0.9 % (ref 0–1.5)
BILIRUB UR QL STRIP: NEGATIVE
CLARITY UR: CLEAR
COLOR UR: YELLOW
DEPRECATED RDW RBC AUTO: 39.9 FL (ref 37–54)
EOSINOPHIL # BLD AUTO: 0.23 10*3/MM3 (ref 0–0.4)
EOSINOPHIL NFR BLD AUTO: 5.2 % (ref 0.3–6.2)
ERYTHROCYTE [DISTWIDTH] IN BLOOD BY AUTOMATED COUNT: 11.9 % (ref 12.3–15.4)
GLUCOSE UR STRIP-MCNC: NEGATIVE MG/DL
HCT VFR BLD AUTO: 36.6 % (ref 34–46.6)
HGB BLD-MCNC: 12.3 G/DL (ref 12–15.9)
HGB UR QL STRIP.AUTO: ABNORMAL
HYALINE CASTS UR QL AUTO: ABNORMAL /LPF
IMM GRANULOCYTES # BLD AUTO: 0 10*3/MM3 (ref 0–0.05)
IMM GRANULOCYTES NFR BLD AUTO: 0 % (ref 0–0.5)
KETONES UR QL STRIP: NEGATIVE
LEUKOCYTE ESTERASE UR QL STRIP.AUTO: NEGATIVE
LYMPHOCYTES # BLD AUTO: 1.33 10*3/MM3 (ref 0.7–3.1)
LYMPHOCYTES NFR BLD AUTO: 29.8 % (ref 19.6–45.3)
MCH RBC QN AUTO: 30.6 PG (ref 26.6–33)
MCHC RBC AUTO-ENTMCNC: 33.6 G/DL (ref 31.5–35.7)
MCV RBC AUTO: 91 FL (ref 79–97)
MONOCYTES # BLD AUTO: 0.36 10*3/MM3 (ref 0.1–0.9)
MONOCYTES NFR BLD AUTO: 8.1 % (ref 5–12)
NEUTROPHILS NFR BLD AUTO: 2.5 10*3/MM3 (ref 1.7–7)
NEUTROPHILS NFR BLD AUTO: 56 % (ref 42.7–76)
NITRITE UR QL STRIP: POSITIVE
NRBC BLD AUTO-RTO: 0 /100 WBC (ref 0–0.2)
PH UR STRIP.AUTO: 7.5 [PH] (ref 5–8)
PLATELET # BLD AUTO: 182 10*3/MM3 (ref 140–450)
PMV BLD AUTO: 10.3 FL (ref 6–12)
PROT UR QL STRIP: NEGATIVE
RBC # BLD AUTO: 4.02 10*6/MM3 (ref 3.77–5.28)
RBC # UR STRIP: ABNORMAL /HPF
REF LAB TEST METHOD: ABNORMAL
SP GR UR STRIP: 1.02 (ref 1–1.03)
SQUAMOUS #/AREA URNS HPF: ABNORMAL /HPF
UROBILINOGEN UR QL STRIP: ABNORMAL
WBC # UR STRIP: ABNORMAL /HPF
WBC NRBC COR # BLD AUTO: 4.46 10*3/MM3 (ref 3.4–10.8)

## 2024-01-11 PROCEDURE — 25810000003 LACTATED RINGERS PER 1000 ML: Performed by: OBSTETRICS & GYNECOLOGY

## 2024-01-11 PROCEDURE — 25010000002 DEXAMETHASONE PER 1 MG: Performed by: NURSE ANESTHETIST, CERTIFIED REGISTERED

## 2024-01-11 PROCEDURE — 59820 CARE OF MISCARRIAGE: CPT | Performed by: OBSTETRICS & GYNECOLOGY

## 2024-01-11 PROCEDURE — 25010000002 KETOROLAC TROMETHAMINE PER 15 MG: Performed by: NURSE ANESTHETIST, CERTIFIED REGISTERED

## 2024-01-11 PROCEDURE — 25010000002 FENTANYL CITRATE PF 50 MCG/ML SOLUTION PREFILLED SYRINGE: Performed by: NURSE ANESTHETIST, CERTIFIED REGISTERED

## 2024-01-11 PROCEDURE — 25010000002 MIDAZOLAM PER 1MG: Performed by: NURSE ANESTHETIST, CERTIFIED REGISTERED

## 2024-01-11 PROCEDURE — 25010000002 PROPOFOL 200 MG/20ML EMULSION: Performed by: NURSE ANESTHETIST, CERTIFIED REGISTERED

## 2024-01-11 PROCEDURE — 25010000002 LIDOCAINE 1 % SOLUTION: Performed by: OBSTETRICS & GYNECOLOGY

## 2024-01-11 PROCEDURE — 25010000002 ONDANSETRON PER 1 MG: Performed by: NURSE ANESTHETIST, CERTIFIED REGISTERED

## 2024-01-11 PROCEDURE — 85025 COMPLETE CBC W/AUTO DIFF WBC: CPT | Performed by: OBSTETRICS & GYNECOLOGY

## 2024-01-11 PROCEDURE — 25010000002 METOCLOPRAMIDE PER 10 MG: Performed by: NURSE ANESTHETIST, CERTIFIED REGISTERED

## 2024-01-11 PROCEDURE — 81001 URINALYSIS AUTO W/SCOPE: CPT | Performed by: OBSTETRICS & GYNECOLOGY

## 2024-01-11 PROCEDURE — 25010000002 GLYCOPYRROLATE PF 0.4 MG/2ML SOLUTION: Performed by: NURSE ANESTHETIST, CERTIFIED REGISTERED

## 2024-01-11 RX ORDER — IBUPROFEN 600 MG/1
600 TABLET ORAL EVERY 6 HOURS PRN
Qty: 60 TABLET | Refills: 0 | Status: SHIPPED | OUTPATIENT
Start: 2024-01-11

## 2024-01-11 RX ORDER — SODIUM CHLORIDE 0.9 % (FLUSH) 0.9 %
10 SYRINGE (ML) INJECTION AS NEEDED
Status: DISCONTINUED | OUTPATIENT
Start: 2024-01-11 | End: 2024-01-11 | Stop reason: HOSPADM

## 2024-01-11 RX ORDER — SODIUM CHLORIDE, SODIUM LACTATE, POTASSIUM CHLORIDE, CALCIUM CHLORIDE 600; 310; 30; 20 MG/100ML; MG/100ML; MG/100ML; MG/100ML
1000 INJECTION, SOLUTION INTRAVENOUS CONTINUOUS
Status: DISCONTINUED | OUTPATIENT
Start: 2024-01-11 | End: 2024-01-11 | Stop reason: HOSPADM

## 2024-01-11 RX ORDER — PROMETHAZINE HYDROCHLORIDE 25 MG/1
12.5 TABLET ORAL ONCE AS NEEDED
Status: DISCONTINUED | OUTPATIENT
Start: 2024-01-11 | End: 2024-01-11 | Stop reason: HOSPADM

## 2024-01-11 RX ORDER — KETOROLAC TROMETHAMINE 30 MG/ML
INJECTION, SOLUTION INTRAMUSCULAR; INTRAVENOUS AS NEEDED
Status: DISCONTINUED | OUTPATIENT
Start: 2024-01-11 | End: 2024-01-11 | Stop reason: SURG

## 2024-01-11 RX ORDER — SODIUM CHLORIDE 9 MG/ML
40 INJECTION, SOLUTION INTRAVENOUS AS NEEDED
Status: DISCONTINUED | OUTPATIENT
Start: 2024-01-11 | End: 2024-01-11 | Stop reason: HOSPADM

## 2024-01-11 RX ORDER — FENTANYL CITRATE 50 UG/ML
INJECTION, SOLUTION INTRAMUSCULAR; INTRAVENOUS AS NEEDED
Status: DISCONTINUED | OUTPATIENT
Start: 2024-01-11 | End: 2024-01-11 | Stop reason: SURG

## 2024-01-11 RX ORDER — ONDANSETRON 2 MG/ML
4 INJECTION INTRAMUSCULAR; INTRAVENOUS ONCE AS NEEDED
Status: DISCONTINUED | OUTPATIENT
Start: 2024-01-11 | End: 2024-01-11 | Stop reason: HOSPADM

## 2024-01-11 RX ORDER — DOXYCYCLINE 100 MG/1
200 CAPSULE ORAL ONCE
Status: COMPLETED | OUTPATIENT
Start: 2024-01-11 | End: 2024-01-11

## 2024-01-11 RX ORDER — ONDANSETRON 2 MG/ML
INJECTION INTRAMUSCULAR; INTRAVENOUS AS NEEDED
Status: DISCONTINUED | OUTPATIENT
Start: 2024-01-11 | End: 2024-01-11 | Stop reason: SURG

## 2024-01-11 RX ORDER — MIDAZOLAM HYDROCHLORIDE 2 MG/2ML
INJECTION, SOLUTION INTRAMUSCULAR; INTRAVENOUS AS NEEDED
Status: DISCONTINUED | OUTPATIENT
Start: 2024-01-11 | End: 2024-01-11 | Stop reason: SURG

## 2024-01-11 RX ORDER — DEXAMETHASONE SODIUM PHOSPHATE 4 MG/ML
INJECTION, SOLUTION INTRA-ARTICULAR; INTRALESIONAL; INTRAMUSCULAR; INTRAVENOUS; SOFT TISSUE AS NEEDED
Status: DISCONTINUED | OUTPATIENT
Start: 2024-01-11 | End: 2024-01-11 | Stop reason: SURG

## 2024-01-11 RX ORDER — LIDOCAINE HCL/PF 100 MG/5ML
SYRINGE (ML) INJECTION AS NEEDED
Status: DISCONTINUED | OUTPATIENT
Start: 2024-01-11 | End: 2024-01-11 | Stop reason: SURG

## 2024-01-11 RX ORDER — METOCLOPRAMIDE HYDROCHLORIDE 5 MG/ML
INJECTION INTRAMUSCULAR; INTRAVENOUS AS NEEDED
Status: DISCONTINUED | OUTPATIENT
Start: 2024-01-11 | End: 2024-01-11 | Stop reason: SURG

## 2024-01-11 RX ORDER — ONDANSETRON 4 MG/1
4 TABLET, ORALLY DISINTEGRATING ORAL ONCE AS NEEDED
Status: DISCONTINUED | OUTPATIENT
Start: 2024-01-11 | End: 2024-01-11 | Stop reason: HOSPADM

## 2024-01-11 RX ORDER — HYDROCODONE BITARTRATE AND ACETAMINOPHEN 5; 325 MG/1; MG/1
1 TABLET ORAL EVERY 6 HOURS PRN
Qty: 12 TABLET | Refills: 0 | Status: SHIPPED | OUTPATIENT
Start: 2024-01-11

## 2024-01-11 RX ORDER — SODIUM CHLORIDE 0.9 % (FLUSH) 0.9 %
3 SYRINGE (ML) INJECTION EVERY 12 HOURS SCHEDULED
Status: DISCONTINUED | OUTPATIENT
Start: 2024-01-11 | End: 2024-01-11 | Stop reason: HOSPADM

## 2024-01-11 RX ORDER — KETAMINE HCL IN NACL, ISO-OSM 100MG/10ML
SYRINGE (ML) INJECTION AS NEEDED
Status: DISCONTINUED | OUTPATIENT
Start: 2024-01-11 | End: 2024-01-11 | Stop reason: SURG

## 2024-01-11 RX ORDER — PROPOFOL 10 MG/ML
INJECTION, EMULSION INTRAVENOUS AS NEEDED
Status: DISCONTINUED | OUTPATIENT
Start: 2024-01-11 | End: 2024-01-11 | Stop reason: SURG

## 2024-01-11 RX ORDER — ONDANSETRON HYDROCHLORIDE 8 MG/1
8 TABLET, FILM COATED ORAL EVERY 8 HOURS PRN
Qty: 15 TABLET | Refills: 0 | Status: SHIPPED | OUTPATIENT
Start: 2024-01-11

## 2024-01-11 RX ORDER — LIDOCAINE HYDROCHLORIDE 10 MG/ML
INJECTION, SOLUTION INFILTRATION; PERINEURAL AS NEEDED
Status: DISCONTINUED | OUTPATIENT
Start: 2024-01-11 | End: 2024-01-11 | Stop reason: HOSPADM

## 2024-01-11 RX ADMIN — Medication 50 MG: at 12:22

## 2024-01-11 RX ADMIN — SODIUM CHLORIDE, POTASSIUM CHLORIDE, SODIUM LACTATE AND CALCIUM CHLORIDE 1000 ML: 600; 310; 30; 20 INJECTION, SOLUTION INTRAVENOUS at 10:45

## 2024-01-11 RX ADMIN — GLYCOPYRROLATE 0.4 MCG: 0.2 INJECTION, SOLUTION INTRAMUSCULAR; INTRAVENOUS at 12:22

## 2024-01-11 RX ADMIN — ONDANSETRON 4 MG: 2 INJECTION INTRAMUSCULAR; INTRAVENOUS at 12:22

## 2024-01-11 RX ADMIN — KETOROLAC TROMETHAMINE 30 MG: 30 INJECTION, SOLUTION INTRAMUSCULAR; INTRAVENOUS at 12:22

## 2024-01-11 RX ADMIN — Medication 100 MG: at 12:22

## 2024-01-11 RX ADMIN — METOCLOPRAMIDE 10 MG: 5 INJECTION, SOLUTION INTRAMUSCULAR; INTRAVENOUS at 12:22

## 2024-01-11 RX ADMIN — DEXAMETHASONE SODIUM PHOSPHATE 8 MG: 4 INJECTION, SOLUTION INTRA-ARTICULAR; INTRALESIONAL; INTRAMUSCULAR; INTRAVENOUS; SOFT TISSUE at 12:22

## 2024-01-11 RX ADMIN — DOXYCYCLINE 200 MG: 100 CAPSULE ORAL at 11:31

## 2024-01-11 RX ADMIN — FENTANYL CITRATE 100 MCG: 50 INJECTION, SOLUTION INTRAMUSCULAR; INTRAVENOUS at 12:22

## 2024-01-11 RX ADMIN — PROPOFOL 370 MG: 10 INJECTION, EMULSION INTRAVENOUS at 12:22

## 2024-01-11 RX ADMIN — MIDAZOLAM HYDROCHLORIDE 2 MG: 1 INJECTION, SOLUTION INTRAMUSCULAR; INTRAVENOUS at 12:11

## 2024-01-11 NOTE — PROGRESS NOTES
Chief Complaint  Miscarriage (Patient seen in ER on 24. Patient advised she has been spotting x 1 week, is getting heavier. )     History of Present Illness:  Patient is 28 y.o.  who presents to [facility] here for follow-up evaluation of possible miscarriage.  Patient was seen in the emergency room on .  She she had spotting for 1 week.  She reports the bleeding has been getting heavier.  She has been passing small clots.  She has been having cramping as well.  She did have a decline in her hCG level.  She denies any fever or chills.  She has been weak and had fatigue.  She denies being dizzy or lightheaded.    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 (Patient not taking: Reported on 1/10/2024) 30 tablet 12    busPIRone (BUSPAR) 5 MG tablet Take 1 tablet by mouth 3 (Three) Times a Day As Needed (anxiety). (Patient not taking: Reported on 1/10/2024) 90 tablet 3    EPINEPHrine (EPIPEN) 0.3 MG/0.3ML solution auto-injector injection Auvi-Q 0.3 mg/0.3 mL injection, auto-injector   Take 1 auto as needed by injection route.       No current facility-administered medications on file prior to visit.     No Known Allergies  Past Surgical History:   Procedure Laterality Date    EYE SURGERY      several wh she was a child     WISDOM TOOTH EXTRACTION       History reviewed. No pertinent family history.  Social History     Socioeconomic History    Marital status:    Tobacco Use    Smoking status: Never    Smokeless tobacco: Never   Vaping Use    Vaping Use: Never used   Substance and Sexual Activity    Alcohol use: Not Currently     Comment:  "occasionly 1-2 drinks/week    Drug use: Never    Sexual activity: Yes     Partners: Male     Birth control/protection: I.U.D.       Physical Examination:  Vital Signs: /70   Ht 177.8 cm (70\")   Wt 99.8 kg (220 lb)   BMI 31.57 kg/m²     General Appearance: alert, appears stated age, and cooperative  Breasts: Not performed.  Abdomen: no masses, no hepatomegaly, no splenomegaly, soft non-tender, no guarding, and no rebound tenderness  Pelvic: Not performed.    Data Review:  The following data was reviewed by: Jia Damico MD on 01/10/2024:     Labs:  Pregnancy, Urine - Urine, Clean Catch (2024 20:50)  Urinalysis With Microscopic If Indicated (No Culture) - Urine, Clean Catch (2024 20:50)  Urinalysis, Microscopic Only - Urine, Clean Catch (2024 20:50)  hCG, Quantitative, Pregnancy (2024 21:40)  ABO / Rh (2024 21:48)  hCG, Quantitative, Pregnancy (2024 09:56)  CBC and Differential (01/10/2024 08:50)  Urinalysis With Culture If Indicated - (01/10/2024 08:50)  Imaging:  US Ob Transvaginal (2024 23:08) -images reviewed and agree with interpretation  US Ob Transvaginal (01/10/2024 08:28)  Medical Records:  None    Assessment and Plan   1. Pregnancy with uncertain fetal viability, single or unspecified fetus  Transvaginal ultrasound obtained.  Patient has been informed regarding the findings.  - US Ob Transvaginal    2. Missed   Patient with missed AB.  I discussed with the patient the various options for management of her symptoms.  I discussed with the patient expectant management, medical management versus surgical.  Patient desires to proceed with a suction D&C.  Orders have been placed as noted.  Instructions and precautions have been given.  Patient voices understanding.  - US Ob Transvaginal    Follow Up/Instructions:  Follow up as noted.  Patient was given instructions and counseling regarding her condition or for health maintenance advice. Please see " specific information pulled into the AVS if appropriate.     Note: Speech recognition transcription software may have been used to dictate portions of this document.  An attempt at proofreading has been made though minor errors in transcription may still be present.    This note was electronically signed.  Jia Damico M.D.   none

## 2024-01-11 NOTE — DISCHARGE INSTRUCTIONS
No pushing, pulling, tugging,  heavy lifting, or strenuous activity.  No major decision making, driving, or drinking alcoholic beverages for 24 hours. ( due to the medications you have  received)  Always use good hand hygiene/washing techniques.  NO driving while taking pain medications.    * if you have an incision:  Check your incision area every day for signs of infection.   Check for:  * more redness, swelling, or pain  *more fluid or blood  *warmth  *pus or bad smell.    To assist you in voiding:  Drink plenty of fluids  Listen to running water while attempting to void.    If you are unable to urinate and you have an uncomfortable urge to void or it has been   6 hours since you were discharged, return to the Emergency Room.    Pelvic rest is best described as not putting anything in your vagina. This includes tampons, douching, tub bathing or sexual activity.

## 2024-01-11 NOTE — ANESTHESIA PREPROCEDURE EVALUATION
Anesthesia Evaluation     Patient summary reviewed and Nursing notes reviewed   no history of anesthetic complications:   NPO Solid Status: > 8 hours  NPO Liquid Status: > 8 hours           Airway   Mallampati: II  TM distance: >3 FB  Neck ROM: full  no difficulty expected and Possible difficult intubation  Dental - normal exam     Pulmonary - negative pulmonary ROS and normal exam   Cardiovascular - negative cardio ROS and normal exam    Rhythm: regular  Rate: normal        Neuro/Psych  (+) headaches, psychiatric history Anxiety  GI/Hepatic/Renal/Endo - negative ROS     Musculoskeletal (-) negative ROS    Abdominal    Substance History - negative use     OB/GYN negative ob/gyn ROS         Other - negative ROS                   Anesthesia Plan    ASA 2     MAC     (Pt told that intravenous sedation will be used as the primary anesthetic along with local anesthesia if necessary. Every effort will be made to make sure the patient is comfortable.     The patient was told they may or may not have recall for the procedure. It was further explained that if the MAC was not adequate that a general anesthetic with either an LMA or endotracheal tube would be required.     Will proceed with the plan of care.)  intravenous induction     Anesthetic plan, risks, benefits, and alternatives have been provided, discussed and informed consent has been obtained with: patient.    CODE STATUS:

## 2024-01-11 NOTE — OP NOTE
Tanner Perez  : 1995  MRN: [id number]  CSN: [id number]  Date: 2024    Operative Report  Pre-op Diagnosis:  Missed  [O02.1]     Post-op Diagnosis:  Post-Op Diagnosis Codes:     * Missed  [O02.1]     Procedure: Procedure(s):  DILATATION AND CURETTAGE WITH SUCTION     Surgeon: Jia Damico M.D.     Assist: Staff was responsible for performing the following activities: Retraction and Suction and their skilled assistance was necessary for the success of this case.     OR Staff: Circulator: Nikole Box RN  Scrub Person: Kwame Taylor     Anesthesia: IV sedation with 1% lidocaine paracervical block     Estimated Blood Loss: 25 mls     ABx: Doxycycline p.o.     Specimens:  Products of conception     Findings: Products of conception noted at the cervical os     Complications: none     Description of Procedure:  After the appropriate time out and after adequate dosing of her anesthesia, the patient had been prepped and draped in the usual sterile fashion.  The patient had been placed in the dorsal lithotomy position using Ibrahima stirrups.  The bladder had been drained with a red rubber catheter per nursing.  A weighted speculum was placed in the vagina.  The anterior lip of the cervix was grasped with a single tooth tenaculum.  The cervix was injected at the 3 and 9 o'clock positions with 1% lidocaine without any complications.  The cervix was then progressively dilated with Godoy dilators.  Suction curettage was then performed using a 8 mm suction cannula followed by sharp curettage with a good cry throughout.  The cervical tenaculum was removed and the cervix was noted to be hemostatic after the application of 2-0 chromic suture in a figure-of-eight fashion.  The patient tolerated the procedure well.  There were no complications.  All instrument and sponge counts were correct at the end of the procedure.  She was taken to postoperative recovery room in stable  condition.    Jia Damico M.D.  1/11/2024  12:43 EST

## 2024-01-11 NOTE — ANESTHESIA POSTPROCEDURE EVALUATION
Patient: Conchita Perez    Procedure Summary       Date: 24 Room / Location: [facility] OR 1 /  [facility]    Anesthesia Start: 1217 Anesthesia Stop: 1244    Procedure: DILATATION AND CURETTAGE WITH SUCTION (Vagina) Diagnosis:       Missed       (Missed  [O02.1])    Surgeons: Jia Damico MD Provider: Sukh Valdez CRNA    Anesthesia Type: MAC ASA Status: 2            Anesthesia Type: MAC    Vitals  Vitals Value Taken Time   /83 24 1311   Temp     Pulse 93 24 1311   Resp 18 24 1311   SpO2 98 % 24 1311           Post Anesthesia Care and Evaluation    Patient location during evaluation: PHASE II  Patient participation: complete - patient participated  Level of consciousness: awake  Pain score: 1  Pain management: adequate    Airway patency: patent  Anesthetic complications: No anesthetic complications  PONV Status: controlled  Cardiovascular status: acceptable and stable  Respiratory status: acceptable  Hydration status: acceptable    Comments: See Nursing record for post procedural Vital Signs as per protocol.

## 2024-01-15 LAB — REF LAB TEST METHOD: NORMAL

## 2024-01-16 ENCOUNTER — OFFICE VISIT (OUTPATIENT)
Dept: INTERNAL MEDICINE | Facility: CLINIC | Age: 29
End: 2024-01-16
Payer: COMMERCIAL

## 2024-01-16 VITALS
BODY MASS INDEX: 31.9 KG/M2 | DIASTOLIC BLOOD PRESSURE: 83 MMHG | SYSTOLIC BLOOD PRESSURE: 131 MMHG | TEMPERATURE: 98 F | OXYGEN SATURATION: 99 % | WEIGHT: 222.8 LBS | HEART RATE: 78 BPM | HEIGHT: 70 IN

## 2024-01-16 DIAGNOSIS — E66.09 CLASS 1 OBESITY DUE TO EXCESS CALORIES WITHOUT SERIOUS COMORBIDITY WITH BODY MASS INDEX (BMI) OF 31.0 TO 31.9 IN ADULT: ICD-10-CM

## 2024-01-16 DIAGNOSIS — G43.009 MIGRAINE WITHOUT AURA AND WITHOUT STATUS MIGRAINOSUS, NOT INTRACTABLE: ICD-10-CM

## 2024-01-16 DIAGNOSIS — Z71.85 IMMUNIZATION COUNSELING: ICD-10-CM

## 2024-01-16 DIAGNOSIS — O02.1 MISSED ABORTION: ICD-10-CM

## 2024-01-16 DIAGNOSIS — Z00.00 ANNUAL PHYSICAL EXAM: Primary | ICD-10-CM

## 2024-01-16 DIAGNOSIS — F41.1 GENERALIZED ANXIETY DISORDER: ICD-10-CM

## 2024-01-16 PROCEDURE — 90471 IMMUNIZATION ADMIN: CPT | Performed by: NURSE PRACTITIONER

## 2024-01-16 PROCEDURE — 90686 IIV4 VACC NO PRSV 0.5 ML IM: CPT | Performed by: NURSE PRACTITIONER

## 2024-01-16 PROCEDURE — 99395 PREV VISIT EST AGE 18-39: CPT | Performed by: NURSE PRACTITIONER

## 2024-01-16 NOTE — PROGRESS NOTES
Subjective   Conchita Perez is a 28 y.o. female and is here for a comprehensive physical exam. The patient reports no problems.    HPI: here today for annual physical exam with no acute complaints.   Recently had miscarriage requiring D & C. Feels like she is going through typical grieving process. When she found out she was pregnant stopped amitriptyline and buspirone. Feels like mood and migraines are stable without medication at this time. She does plan on following up with GYN.   Would like her flu shot today.       Health Habits:  Eye exam within last 2 years? Yes.   Dental exam every 6 months? Yes   Exercise habits: no structured exercise.   Healthy diet? Typical American diet     The ASCVD Risk score (Grecia RODRIGUEZ, et al., 2019) failed to calculate for the following reasons:    The 2019 ASCVD risk score is only valid for ages 40 to 79        Do you take any herbs or supplements that were not prescribed by a doctor? no  Are you taking calcium supplements? No  Are you taking aspirin daily? No     History:  LMP: Patient's last menstrual period was 2023.  Menopause: No  Last pap date: 2021  Abnormal pap? no  Family history of breast or ovarian cancer: no      OB History    Para Term  AB Living   0 0 0 0 0 0   SAB IAB Ectopic Molar Multiple Live Births   0 0 0 0 0 0      reports being sexually active and has had partner(s) who are male. She reports using the following method of birth control/protection: I.U.D..    The following portions of the patient's history were reviewed and updated as appropriate: She  has a past medical history of ADHD (attention deficit hyperactivity disorder), Allergic (2021), Anxiety, Migraines, Visual impairment, and Wears glasses.  She does not have any pertinent problems on file.  She  has a past surgical history that includes Eye surgery; [place] tooth extraction; and d & c with suction (N/A, 2024).  Her family history is not on file.  She  reports  "that she has never smoked. She has never been exposed to tobacco smoke. She has never used smokeless tobacco. She reports that she does not currently use alcohol. She reports that she does not use drugs.  Current Outpatient Medications   Medication Sig Dispense Refill    EPINEPHrine (EPIPEN) 0.3 MG/0.3ML solution auto-injector injection Auvi-Q 0.3 mg/0.3 mL injection, auto-injector   Take 1 auto as needed by injection route.      HYDROcodone-acetaminophen (NORCO) 5-325 MG per tablet Take 1 tablet by mouth Every 6 (Six) Hours As Needed for Pain 12 tablet 0    ibuprofen (ADVIL,MOTRIN) 600 MG tablet Take 1 tablet by mouth Every 6 (Six) Hours As Needed for Mild Pain. 60 tablet 0    ondansetron (ZOFRAN) 8 MG tablet Take 1 tablet by mouth Every 8 (Eight) Hours As Needed for Nausea or Vomiting. 15 tablet 0    prenatal vitamin (prenatal, CLASSIC, vitamin) tablet Take  by mouth Daily.       No current facility-administered medications for this visit.       Objective   /83   Pulse 78   Temp 98 °F (36.7 °C) (Tympanic)   Ht 177.8 cm (70\")   Wt 101 kg (222 lb 12.8 oz)   LMP 11/01/2023   SpO2 99%   BMI 31.97 kg/m²     Physical Exam  Vitals and nursing note reviewed.   Constitutional:       General: She is not in acute distress.     Appearance: Normal appearance.   HENT:      Right Ear: Tympanic membrane and ear canal normal.      Left Ear: Tympanic membrane and ear canal normal.      Nose: Nose normal.      Mouth/Throat:      Mouth: Mucous membranes are moist.      Pharynx: Oropharynx is clear. No posterior oropharyngeal erythema.   Eyes:      Extraocular Movements: Extraocular movements intact.      Pupils: Pupils are equal, round, and reactive to light.   Neck:      Thyroid: No thyroid mass or thyromegaly.      Vascular: No carotid bruit.   Cardiovascular:      Rate and Rhythm: Normal rate and regular rhythm.      Pulses: Normal pulses.      Heart sounds: Normal heart sounds. No murmur heard.  Pulmonary:      " Effort: Pulmonary effort is normal.      Breath sounds: Normal breath sounds. No wheezing.   Abdominal:      General: Bowel sounds are normal. There is no distension.      Palpations: Abdomen is soft. There is no mass.      Tenderness: There is abdominal tenderness (mild RLQ./suprapubic tenderness).   Musculoskeletal:         General: No deformity. Normal range of motion.      Cervical back: Normal range of motion and neck supple. No muscular tenderness.   Lymphadenopathy:      Head:      Right side of head: No submandibular, tonsillar, preauricular or posterior auricular adenopathy.      Left side of head: No submandibular, tonsillar, preauricular or posterior auricular adenopathy.      Cervical: No cervical adenopathy.   Skin:     General: Skin is warm and dry.      Capillary Refill: Capillary refill takes less than 2 seconds.      Findings: No bruising or rash.   Neurological:      Mental Status: She is alert and oriented to person, place, and time.      Gait: Gait normal.      Deep Tendon Reflexes: Reflexes normal.   Psychiatric:         Mood and Affect: Mood normal.         Behavior: Behavior normal.       Assessment & Plan   Healthy female exam.    Diagnosis Plan   1. Annual physical exam  Preventative maintenance discussed during visit and information provided in AVS.       2. Immunization counseling  Fluzone (or Fluarix & Flulaval for VFC) >6mos    We discussed the risks and benefits of Flulaval (Flu 6+months-64 yrs).      Counseling was given to inform of the potential signs and symptoms of adverse effects and when to seek medical attention.      The CDC Vaccination Information Sheet (VIS) was given for review prior to administration.  A copy of the VIS was also offered.          3. Class 1 obesity due to excess calories without serious comorbidity with body mass index (BMI) of 31.0 to 31.9 in adult  CBC No Differential    Comprehensive metabolic panel    Lipid Panel    TSH Rfx On Abnormal To Free  T4    Healthy diet and exercise recommendations discussed.       4. Generalized anxiety disorder  CBC No Differential    Comprehensive metabolic panel    Lipid Panel    TSH Rfx On Abnormal To Free T4    Controlled without medication currently. Recommend healthy diet, exercise, daily sun exposure, sleep hygiene measures, and talking with supportive family and friends.       5. Migraine without aura and without status migrainosus, not intractable  CBC No Differential    Comprehensive metabolic panel    Lipid Panel    TSH Rfx On Abnormal To Free T4    Stable without preventative medication currently. Recommend avoiding triggers, adequate hydration, stress management.       6. Missed   CBC No Differential    Comprehensive metabolic panel    Lipid Panel    TSH Rfx On Abnormal To Free T4    S/P D&C, keep scheduled follow-up with GYN. Continue prenatal vitamin.             2. Patient Counseling:  --Nutrition: Stressed importance of moderation in sodium/caffeine intake, saturated fat and cholesterol, caloric balance, sufficient intake of fresh fruits, vegetables, fiber, calcium, iron, and 1 g folate supplementation if of childbearing age.   --Discussed the issue of calcium supplement, and the daily use of baby aspirin if applicable.             --Mammogram recommended every 2 years from age 40-49 and yearly beginning at age 50.  --Exercise: Stressed the importance of regular exercise.   --Substance Abuse: Discussed cessation/primary prevention of tobacco (if applicable), alcohol, or other drug use (if applicable); driving or other dangerous activities under the influence; availability of treatment for abuse.    --Sexuality: Discussed sexually transmitted diseases, partner selection, use of condoms, avoidance of unintended pregnancy  and contraceptive alternatives.   --Injury prevention: Discussed safety belts, safety helmets, smoke detector, smoking near bedding or upholstery.   --Dental health: Discussed importance  of regular tooth brushing, flossing, and dental visits every 6 months.  --Immunizations reviewed.  --Discussed benefits of screening colonoscopy (if applicable).  --After hours service discussed with patient    3. Discussed the patient's BMI with her.  The BMI is above average; BMI management plan is completed  BMI is >= 30 and <35. (Class 1 Obesity). The following options were offered after discussion;: Information on healthy weight added to patient's after visit summary.     4. Return in about 1 year (around 1/16/2025) for Annual.  KARIN Slater  01/16/2024  08:08 EST

## 2024-01-17 LAB
ALBUMIN SERPL-MCNC: 4.9 G/DL (ref 3.5–5.2)
ALBUMIN/GLOB SERPL: 2.2 G/DL
ALP SERPL-CCNC: 71 U/L (ref 39–117)
ALT SERPL-CCNC: 12 U/L (ref 1–33)
AST SERPL-CCNC: 12 U/L (ref 1–32)
BILIRUB SERPL-MCNC: 0.3 MG/DL (ref 0–1.2)
BUN SERPL-MCNC: 13 MG/DL (ref 6–20)
BUN/CREAT SERPL: 15.9 (ref 7–25)
CALCIUM SERPL-MCNC: 9.7 MG/DL (ref 8.6–10.5)
CHLORIDE SERPL-SCNC: 103 MMOL/L (ref 98–107)
CHOLEST SERPL-MCNC: 180 MG/DL (ref 0–200)
CO2 SERPL-SCNC: 25.3 MMOL/L (ref 22–29)
CREAT SERPL-MCNC: 0.82 MG/DL (ref 0.57–1)
EGFRCR SERPLBLD CKD-EPI 2021: 100.7 ML/MIN/1.73
ERYTHROCYTE [DISTWIDTH] IN BLOOD BY AUTOMATED COUNT: 12.1 % (ref 12.3–15.4)
GLOBULIN SER CALC-MCNC: 2.2 GM/DL
GLUCOSE SERPL-MCNC: 95 MG/DL (ref 65–99)
HCT VFR BLD AUTO: 39.4 % (ref 34–46.6)
HDLC SERPL-MCNC: 46 MG/DL (ref 40–60)
HGB BLD-MCNC: 12.9 G/DL (ref 12–15.9)
LDLC SERPL CALC-MCNC: 122 MG/DL (ref 0–100)
MCH RBC QN AUTO: 30.1 PG (ref 26.6–33)
MCHC RBC AUTO-ENTMCNC: 32.7 G/DL (ref 31.5–35.7)
MCV RBC AUTO: 92.1 FL (ref 79–97)
PLATELET # BLD AUTO: 201 10*3/MM3 (ref 140–450)
POTASSIUM SERPL-SCNC: 4.5 MMOL/L (ref 3.5–5.2)
PROT SERPL-MCNC: 7.1 G/DL (ref 6–8.5)
RBC # BLD AUTO: 4.28 10*6/MM3 (ref 3.77–5.28)
SODIUM SERPL-SCNC: 140 MMOL/L (ref 136–145)
TRIGL SERPL-MCNC: 61 MG/DL (ref 0–150)
TSH SERPL DL<=0.005 MIU/L-ACNC: 1.89 UIU/ML (ref 0.27–4.2)
VLDLC SERPL CALC-MCNC: 12 MG/DL (ref 5–40)
WBC # BLD AUTO: 4.91 10*3/MM3 (ref 3.4–10.8)

## 2024-01-24 ENCOUNTER — OFFICE VISIT (OUTPATIENT)
Dept: OBSTETRICS AND GYNECOLOGY | Facility: CLINIC | Age: 29
End: 2024-01-24
Payer: COMMERCIAL

## 2024-01-24 VITALS
BODY MASS INDEX: 31.7 KG/M2 | DIASTOLIC BLOOD PRESSURE: 80 MMHG | SYSTOLIC BLOOD PRESSURE: 120 MMHG | HEIGHT: 70 IN | WEIGHT: 221.4 LBS

## 2024-01-24 DIAGNOSIS — Z09 POSTOPERATIVE FOLLOW-UP: Primary | ICD-10-CM

## 2024-01-24 PROCEDURE — 99024 POSTOP FOLLOW-UP VISIT: CPT | Performed by: PHYSICIAN ASSISTANT

## 2024-01-24 NOTE — PROGRESS NOTES
Subjective   Chief Complaint   Patient presents with    Post-op     2 weeks post-op suction D&C, doing well       Conchita Perez is a 28 y.o. year old  presenting to be seen for post op check.  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    Anxiety     Migraines     Visual impairment     Wears glasses         Current Outpatient Medications:     EPINEPHrine (EPIPEN) 0.3 MG/0.3ML solution auto-injector injection, Auvi-Q 0.3 mg/0.3 mL injection, auto-injector  Take 1 auto as needed by injection route., Disp: , Rfl:     prenatal vitamin (prenatal, CLASSIC, vitamin) tablet, Take  by mouth Daily., Disp: , Rfl:    No Known Allergies   Past Surgical History:   Procedure Laterality Date    D & C WITH SUCTION N/A 2024    Procedure: DILATATION AND CURETTAGE WITH SUCTION;  Surgeon: Jia Damico MD;  Location: [facility];  Service: Obstetrics/Gynecology;  Laterality: N/A;    EYE SURGERY      several wh she was a child     WISDOM TOOTH EXTRACTION        Social History     Socioeconomic History    Marital status:    Tobacco Use    Smoking status: Never     Passive exposure: Never    Smokeless tobacco: Never   Vaping Use    Vaping Use: Never used   Substance and Sexual Activity    Alcohol use: Not Currently     Comment: occasionly 1-2 drinks/week    Drug use: Never    Sexual activity: Yes     Partners: Male     Birth control/protection: I.U.D.      History reviewed. No pertinent family history.    Review of Systems   Constitutional:  Negative for chills, diaphoresis and fever.   Gastrointestinal:  Negative for constipation, diarrhea, nausea and vomiting.   Genitourinary:  Negative for difficulty urinating, dysuria, pelvic pain and vaginal bleeding.           Objective   /80   Ht  "177.8 cm (70\")   Wt 100 kg (221 lb 6.4 oz)   LMP 11/01/2023   BMI 31.77 kg/m²     Physical Exam  Constitutional:       Appearance: Normal appearance. She is well-developed and well-groomed.   Eyes:      General: Lids are normal.      Extraocular Movements: Extraocular movements intact.      Conjunctiva/sclera: Conjunctivae normal.   Neurological:      General: No focal deficit present.      Mental Status: She is alert and oriented to person, place, and time.   Psychiatric:         Attention and Perception: Attention normal.         Mood and Affect: Mood normal.         Speech: Speech normal.         Behavior: Behavior is cooperative.            Result Review :           TISSUE EXAM, [facility] ([facility]) (01/11/2024 12:32)         Assessment and Plan  Diagnoses and all orders for this visit:    1. Postoperative follow-up (Primary)      Patient Instructions   Continue prenatal vitamins  Encourage to have 2 normal cycles before trying for conception again           This note was electronically signed.    Rupa Chen PA-C   January 24, 2024  "

## 2024-07-03 ENCOUNTER — INITIAL PRENATAL (OUTPATIENT)
Dept: OBSTETRICS AND GYNECOLOGY | Facility: CLINIC | Age: 29
End: 2024-07-03
Payer: COMMERCIAL

## 2024-07-03 VITALS — BODY MASS INDEX: 31.34 KG/M2 | DIASTOLIC BLOOD PRESSURE: 78 MMHG | SYSTOLIC BLOOD PRESSURE: 120 MMHG | WEIGHT: 218.4 LBS

## 2024-07-03 DIAGNOSIS — Z34.81 ENCOUNTER FOR SUPERVISION OF OTHER NORMAL PREGNANCY IN FIRST TRIMESTER: Primary | ICD-10-CM

## 2024-07-03 DIAGNOSIS — O21.9 NAUSEA/VOMITING IN PREGNANCY: ICD-10-CM

## 2024-07-03 DIAGNOSIS — R51.9 HEADACHE IN PREGNANCY, ANTEPARTUM, FIRST TRIMESTER: ICD-10-CM

## 2024-07-03 DIAGNOSIS — O36.80X0 ENCOUNTER TO DETERMINE FETAL VIABILITY OF PREGNANCY, SINGLE OR UNSPECIFIED FETUS: ICD-10-CM

## 2024-07-03 DIAGNOSIS — O26.891 HEADACHE IN PREGNANCY, ANTEPARTUM, FIRST TRIMESTER: ICD-10-CM

## 2024-07-03 PROCEDURE — 0501F PRENATAL FLOW SHEET: CPT | Performed by: STUDENT IN AN ORGANIZED HEALTH CARE EDUCATION/TRAINING PROGRAM

## 2024-07-03 RX ORDER — MAGNESIUM OXIDE 400 MG/1
400 TABLET ORAL DAILY
Qty: 30 TABLET | Refills: 5 | Status: SHIPPED | OUTPATIENT
Start: 2024-07-03

## 2024-07-03 RX ORDER — DIPHENHYDRAMINE HYDROCHLORIDE 25 MG/1
25 CAPSULE ORAL NIGHTLY
Qty: 30 TABLET | Refills: 5 | Status: SHIPPED | OUTPATIENT
Start: 2024-07-03

## 2024-07-03 RX ORDER — ONDANSETRON 4 MG/1
4 TABLET, ORALLY DISINTEGRATING ORAL EVERY 8 HOURS PRN
Qty: 30 TABLET | Refills: 3 | Status: SHIPPED | OUTPATIENT
Start: 2024-07-03

## 2024-07-03 NOTE — PROGRESS NOTES
New Pregnancy Visit    Subjective   Chief Complaint   Patient presents with    Initial Prenatal Visit     No complaints.      Conchita Perez is a 29 y.o.  at 11w1d, Estimated Date of Delivery: 25 by LMP c/w sono today. She presents to initiate prenatal care with our group today.     Reports intermittent N/V this pregnancy with symptoms worsened by headaches/ migraines. Tylenol helps with headaches if 1000mg are taken at a time. Denies any vaginal bleeding this pregnancy.    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    Anxiety     Migraines     Visual impairment     Wears glasses       Past Surgical History:   Procedure Laterality Date    D & C WITH SUCTION N/A 2024    Procedure: DILATATION AND CURETTAGE WITH SUCTION;  Surgeon: Jia Damico MD;  Location: [facility];  Service: Obstetrics/Gynecology;  Laterality: N/A;    EYE SURGERY      several wh she was a child     WISDOM TOOTH EXTRACTION        Social History    Tobacco Use      Smoking status: Never        Passive exposure: Never      Smokeless tobacco: Never    Current Outpatient Medications on File Prior to Visit   Medication Sig Dispense Refill    EPINEPHrine (EPIPEN) 0.3 MG/0.3ML solution auto-injector injection Auvi-Q 0.3 mg/0.3 mL injection, auto-injector   Take 1 auto as needed by injection route.      prenatal vitamin (prenatal, CLASSIC, vitamin) tablet Take  by mouth Daily.       No current facility-administered medications on file prior to visit.        Objective   /78   Wt 99.1 kg (218 lb 6.4 oz)   LMP 2024   BMI 31.34 kg/m²   Physical Exam:  See  prenatal physical     Lab Review:   TSH   Date Value Ref Range Status   01/16/2024 1.890 0.270 - 4.200 uIU/mL Final       Latest Reference Range & Units 01/16/24 08:33   Sodium 136 - 145 mmol/L 140   Potassium 3.5 - 5.2 mmol/L 4.5   Chloride 98 - 107 mmol/L 103   CO2 22.0 - 29.0 mmol/L 25.3   BUN 6 - 20 mg/dL 13   Creatinine 0.57 - 1.00 mg/dL 0.82   BUN/Creatinine Ratio 7.0 - 25.0  15.9   EGFR Result >60.0 mL/min/1.73 100.7   Glucose 65 - 99 mg/dL 95   Calcium 8.6 - 10.5 mg/dL 9.7   Alkaline Phosphatase 39 - 117 U/L 71   Total Protein 6.0 - 8.5 g/dL 7.1   Albumin 3.5 - 5.2 g/dL 4.9   A/G Ratio g/dL 2.2   AST (SGOT) 1 - 32 U/L 12   ALT (SGPT) 1 - 33 U/L 12   Total Bilirubin 0.0 - 1.2 mg/dL 0.3   WBC 3.40 - 10.80 10*3/mm3 4.91   RBC 3.77 - 5.28 10*6/mm3 4.28   Hemoglobin 12.0 - 15.9 g/dL 12.9   Hematocrit 34.0 - 46.6 % 39.4   Platelets 140 - 450 10*3/mm3 201   RDW 12.3 - 15.4 % 12.1 (L)   MCV 79.0 - 97.0 fL 92.1   MCH 26.6 - 33.0 pg 30.1   MCHC 31.5 - 35.7 g/dL 32.7   (L): Data is abnormally low    Imaging Review:   US performed today -   IUP measuring 11w5d with appropriate fetal cardiac activity. ANIVAL is set at 1/21/25, consistent with 11w1d today, by last menstrual period. The cervix and bilateral ovaries are normal in appearance. No free fluid in the cul-de-sac.      Assessment & Plan     IUP at 11w1d  Routine OB -   Prenatal labs ordered today  Pap smear completed today   Genetic testing reviewed:  cfDNA, carrier screening ordered  Information reviewed: exercise in pregnancy, nutrition in pregnancy, weight gain in pregnancy, work and travel restrictions during pregnancy, list of OTC medications acceptable in pregnancy, and call coverage groups  H/o anxiety, previously on buspar. Currently stable without, discussed this can be restarted if symptoms worsen.  N/V: Unisom, B6, zofran sent to pharmacy  Frequent HA: Magnesium oxide sent to pharmacy     Diagnosis Plan   1. Encounter for supervision of other normal  pregnancy in first trimester  OB Panel With HIV    Urine Drug Screen - Urine, Clean Catch    LIQUID-BASED PAP SMEAR WITH HPV GENOTYPING IF ASCUS ([facility])    [id number] PLUS Core+SCA - Blood,    Sickle Cell Screen    Cystic Fibrosis Diagnostic Study    Spinal Muscular Atrophy (SMA) - Blood,      2. Encounter to determine fetal viability of pregnancy, single or unspecified fetus  US Ob < 14 Weeks Single or First Gestation      3. Nausea/vomiting in pregnancy  doxylamine (UNISOM) 25 MG tablet    vitamin B-6 (PYRIDOXINE) 25 MG tablet    ondansetron ODT (ZOFRAN-ODT) 4 MG disintegrating tablet      4. Headache in pregnancy, antepartum, first trimester  magnesium oxide (MAG-OX) 400 MG tablet         Follow up: 4 week(s)    Poonam Jackman MD  Obstetrics and Gynecology  [facility]

## 2024-07-11 LAB — REF LAB TEST METHOD: NORMAL

## 2024-07-19 LAB
ABO GROUP BLD: NORMAL
AMPHETAMINES UR QL SCN: NEGATIVE NG/ML
BARBITURATES UR QL SCN: NEGATIVE NG/ML
BASOPHILS # BLD AUTO: 0 X10E3/UL (ref 0–0.2)
BASOPHILS NFR BLD AUTO: 0 %
BENZODIAZ UR QL SCN: NEGATIVE NG/ML
BLD GP AB SCN SERPL QL: NEGATIVE
BZE UR QL SCN: NEGATIVE NG/ML
CANNABINOIDS UR QL SCN: NEGATIVE NG/ML
CFDNA.FET/CFDNA.TOTAL SFR FETUS: NORMAL %
CFTR MUT ANL BLD/T: NORMAL
CITATION REF LAB TEST: NORMAL
CLINICAL INFO: NORMAL
CLINICAL INFO: NORMAL
CREAT UR-MCNC: 71.4 MG/DL (ref 20–300)
EOSINOPHIL # BLD AUTO: 0.1 X10E3/UL (ref 0–0.4)
EOSINOPHIL NFR BLD AUTO: 1 %
ERYTHROCYTE [DISTWIDTH] IN BLOOD BY AUTOMATED COUNT: 12.4 % (ref 11.7–15.4)
ETHNIC BACKGROUND STATED: NORMAL
ETHNIC BACKGROUND STATED: NORMAL
FET 13+18+21+X+Y ANEUP PLAS.CFDNA: NEGATIVE
FET CHR 21 TS PLAS.CFDNA QL: NEGATIVE
FET MS X RISK WBC.DNA+CFDNA QL: NOT DETECTED
FET SEX PLAS.CFDNA DOSAGE CFDNA: NORMAL
FET TS 13 RISK PLAS.CFDNA QL: NEGATIVE
FET TS 18 RISK WBC.DNA+CFDNA QL: NEGATIVE
FET X + Y ANEUP RISK PLAS.CFDNA SEQ-IMP: NOT DETECTED
GA EST FROM CONCEPTION DATE: NORMAL D
GENE DIS ANL CARRIER INTERP-IMP: NORMAL
GENE DIS ANL CARRIER INTERP-IMP: NORMAL
GENE MUT TESTED BLD/T: NORMAL
GENE MUT TESTED BLD/T: NORMAL
GESTATIONAL AGE > 9:: YES
HBV SURFACE AG SERPL QL IA: NEGATIVE
HCT VFR BLD AUTO: 38.4 % (ref 34–46.6)
HCV IGG SERPL QL IA: NON REACTIVE
HGB BLD-MCNC: 12.5 G/DL (ref 11.1–15.9)
HGB S BLD QL SOLY: NEGATIVE
HIV 1+2 AB+HIV1 P24 AG SERPL QL IA: NON REACTIVE
IMM GRANULOCYTES # BLD AUTO: 0 X10E3/UL (ref 0–0.1)
IMM GRANULOCYTES NFR BLD AUTO: 0 %
LAB DIRECTOR NAME PROVIDER: NORMAL
LABORATORY COMMENT REPORT: NORMAL
LABORATORY COMMENT REPORT: NORMAL
LIMITATIONS OF THE TEST: NORMAL
LYMPHOCYTES # BLD AUTO: 1.2 X10E3/UL (ref 0.7–3.1)
LYMPHOCYTES NFR BLD AUTO: 20 %
MCH RBC QN AUTO: 29.8 PG (ref 26.6–33)
MCHC RBC AUTO-ENTMCNC: 32.6 G/DL (ref 31.5–35.7)
MCV RBC AUTO: 92 FL (ref 79–97)
METHADONE UR QL SCN: NEGATIVE NG/ML
MONOCYTES # BLD AUTO: 0.4 X10E3/UL (ref 0.1–0.9)
MONOCYTES NFR BLD AUTO: 7 %
NEGATIVE PREDICTIVE VALUE: NORMAL
NEUTROPHILS # BLD AUTO: 4 X10E3/UL (ref 1.4–7)
NEUTROPHILS NFR BLD AUTO: 72 %
NOTE: NORMAL
OPIATES UR QL SCN: NEGATIVE NG/ML
OXYCODONE+OXYMORPHONE UR QL SCN: NEGATIVE NG/ML
PCP UR QL: NEGATIVE NG/ML
PERFORMANCE CHARACTERISTICS: NORMAL
PH UR: 6.7 [PH] (ref 4.5–8.9)
PLATELET # BLD AUTO: 192 X10E3/UL (ref 150–450)
POSITIVE PREDICTIVE VALUE: NORMAL
PROPOXYPH UR QL SCN: NEGATIVE NG/ML
RBC # BLD AUTO: 4.19 X10E6/UL (ref 3.77–5.28)
REASON FOR REFERRAL (NARRATIVE): NORMAL
REASON FOR REFERRAL (NARRATIVE): NORMAL
RECOMMENDATION PATIENT DOC-IMP: NORMAL
RECOMMENDATION PATIENT DOC-IMP: NORMAL
REF LAB TEST METHOD: NORMAL
RH BLD: POSITIVE
RPR SER QL: NON REACTIVE
RUBV IGG SERPL IA-ACNC: 10 INDEX
SERVICE CMNT-IMP: NORMAL
SERVICE CMNT-IMP: NORMAL
SMN1 GENE MUT ANL BLD/T: NORMAL
SPECIMEN SOURCE: NORMAL
SPECIMEN SOURCE: NORMAL
TEST PERFORMANCE INFO SPEC: NORMAL
WBC # BLD AUTO: 5.6 X10E3/UL (ref 3.4–10.8)

## 2024-07-29 ENCOUNTER — ROUTINE PRENATAL (OUTPATIENT)
Dept: OBSTETRICS AND GYNECOLOGY | Facility: CLINIC | Age: 29
End: 2024-07-29
Payer: COMMERCIAL

## 2024-07-29 VITALS — DIASTOLIC BLOOD PRESSURE: 68 MMHG | WEIGHT: 218.2 LBS | BODY MASS INDEX: 31.31 KG/M2 | SYSTOLIC BLOOD PRESSURE: 110 MMHG

## 2024-07-29 DIAGNOSIS — O21.9 NAUSEA AND VOMITING DURING PREGNANCY PRIOR TO 22 WEEKS GESTATION: ICD-10-CM

## 2024-07-29 DIAGNOSIS — Z34.82 ENCOUNTER FOR SUPERVISION OF OTHER NORMAL PREGNANCY, SECOND TRIMESTER: Primary | ICD-10-CM

## 2024-07-29 RX ORDER — HYDROCODONE BITARTRATE AND ACETAMINOPHEN 5; 325 MG/1; MG/1
TABLET ORAL
COMMUNITY

## 2024-07-29 RX ORDER — AMITRIPTYLINE HYDROCHLORIDE 10 MG/1
1 TABLET, FILM COATED ORAL DAILY
COMMUNITY

## 2024-07-29 RX ORDER — ONDANSETRON HYDROCHLORIDE 8 MG/1
TABLET, FILM COATED ORAL
COMMUNITY

## 2024-07-29 NOTE — PROGRESS NOTES
Chief Complaint  Routine Prenatal Visit (No concerns. )    History of Present Illness:  Conchita is a  currently at 14w6d who presents today with no significant complaints.  Patient does have a headache this morning.  She did have emesis as well.  She does have a history of migraines.  She does report however her nausea and vomiting has improved.  She has been taking her Diclegis and vitamin B6.  She has not noticed any fetal movement at present.  She denies any vaginal bleeding or spotting.    Exam:  Vitals:  See prenatal flowsheet as noted and reviewed  General: Alert, cooperative, and does not appear in any distress  Abdomen:   See prenatal flowsheet as noted and reviewed    Uterus gravid, non-tender; no palpable masses    No guarding or rebound tenderness  Pelvic:  See prenatal flowsheet as noted and reviewed  Ext:  See prenatal flowsheet as noted and reviewed    Moves extremities well, no cyanosis and no redness  Urine:  See prenatal flowsheet as noted and reviewed    Data Review:  The following data was reviewed by: Jia Damico MD on 2024:  Prenatal Labs:  Lab Results   Component Value Date    HGB 12.5 2024    RUBELLAABIGG 10.00 2024    HEPBSAG Negative 2024    ABO O 2024    RH Positive 2024    ABSCRN Negative 2024    [id number] Non Reactive 2024    HEPCVIRUSABY Non Reactive 2024       Initial Prenatal on 2024   Component Date Value    Hepatitis B Surface Ag 2024 Negative     Hep C Virus Ab 2024 Non Reactive     RPR 2024 Non Reactive     Rubella Antibodies, IgG 2024 10.00     ABO Type 2024 O     Rh Factor 2024 Positive     Antibody Screen 2024 Negative     HIV Screen 4th Gen w/RFX* 2024 Non Reactive     WBC 2024 5.6     RBC 2024 4.19     Hemoglobin 2024 12.5     Hematocrit 2024 38.4     MCV 2024 92     MCH 2024 29.8     MCHC 2024 32.6     RDW 2024  12.4     Platelets 2024 192     Neutrophil Rel % 2024 72     Lymphocyte Rel % 2024 20     Monocyte Rel % 2024 7     Eosinophil Rel % 2024 1     Basophil Rel % 2024 0     Neutrophils Absolute 2024 4.0     Lymphocytes Absolute 2024 1.2     Monocytes Absolute 2024 0.4     Eosinophils Absolute 2024 0.1     Basophils Absolute 2024 0.0     Immature Granulocyte Rel* 2024 0     Immature Grans Absolute 2024 0.0     Amphetamine, Urine Qual 2024 Negative     Barbiturates Screen, Uri* 2024 Negative     Benzodiazepine Screen, U* 2024 Negative     THC Screen, Urine 2024 Negative     Cocaine Screen, Urine 2024 Negative     Opiate Screen, Urine 2024 Negative     Oxycodone/Oxymorphone, U* 2024 Negative     Phencyclidine (PCP), Uri* 2024 Negative     Methadone Screen, Urine 2024 Negative     Propoxyphene Screen 2024 Negative     Creatinine, Urine 2024 71.4     pH, UA 2024 6.7     Please note 2024 Comment     Reference Lab Report 2024                      Value:[facility]  [address] [place] [address]  Phone: [phone] or [phone]  Fax: [phone]  Clyde Serrano M.D., Medical Director    PATIENT NAME                                     LABORATORY NO.  AINSLEY GORE                                  [id number]  [id number]                                 AGE                    SEX   SSN              CLIENT REF #  [facility]                        29        1995      F     [id number]      [id number]    3 [facility] 3          REQUESTING M.D.           ATTENDING M.D.         COPY TO.  LAZ Moore                                    [place] [address]                         DATE COLLECTED            DATE RECEIVED          DATE REPORTED  2024              07/11/2024    ThinPrep Pap with [facility]    DIAGNOSIS:  Negative for intraepithelial lesion or malignancy    Multiple factors can influence accuracy of Pap                           tests; therefore, screening at  regular intervals is necessary for early cancer detection.      SPECIMEN ADEQUACY:  SATISFACTORY FOR EVALUATION  Transformation zone is present.  SOURCE OF SPECIMEN:       CERVICAL  SLIDES:  1  CLINICAL HISTORY:  Encounter for supervision of other normal pregnancy in first trimester  Encounter to determine fetal viability of pregnancy, single  or unspecified fetus  Nausea/vomiting in pregnancy  Headache in pregnancy, antepartum, first trimester    Chlamydia / Gonorrhea  CHLAMYDIA TRACHOMATIS: Negative  NEISSERIA GONORRHOEAE: Negative    The Aptima Combo 2 assay is a target amplification nucleic acid probe test that  utilizes target capture for the in vitro qualitative detection and differentiation of  ribosomal RNA from Chlamydia trachomatis and Neisseria gonorrhoeae to aid  in the diagnosis of chlamydial and gonococcal disease using the [employer] [place] system.    CYTOTECHNOLOGIST:             FELICIANO PAPPAS (ASCP)    CPT CODES:  49293, 21410, 73998      Gestation 07/03/2024 Leonardo     Fetal Fraction 07/03/2024 6%     Gestational Age >9: 07/03/2024 Yes     Result 07/03/2024 Negative      Comments 07/03/2024 Comment     Approved By 07/03/2024 Comment     TRISOMY 21 (DOWN SYNDROM* 07/03/2024 Negative     TRISOMY 18 (SORENSON SYND* 07/03/2024 Negative     TRISOMY 13 (PATAU SYNDRO* 07/03/2024 Negative     FETAL SEX 07/03/2024 Comment     MONOSOMY X (FERNANDO SYNDR* 07/03/2024 Not Detected     XYY (ARAIZA SYNDROME) 07/03/2024 Not Detected     XXY (KLINEFELTER SYNDROM* 07/03/2024 Not Detected     XXX (TRIPLE X SYNDROME) 07/03/2024 Not Detected     NEGATIVE PREDICTIVE VALUE 07/03/2024 Note     POSITIVE PREDICTIVE VALUE 07/03/2024 N/A     About The Test 07/03/2024 Comment     Test Method 07/03/2024  Comment     Performance 07/03/2024 Comment     PERFORMANCE CHARACTERIST* 07/03/2024 Note     Limitations of the Test 07/03/2024 Comment     Note 07/03/2024 Comment     References 07/03/2024 Comment     Hemoglobin (Hgb) Solubil* 07/03/2024 Negative     Ethnicity 07/03/2024 Comment     Specimen Type 07/03/2024 Comment     Indication: 07/03/2024 Comment     Result 07/03/2024 Comment     Interpretation 07/03/2024 Comment     RECOMMENDATIONS 07/03/2024 Comment     Additional Clinic Info 07/03/2024 Comment     COMMENTS 07/03/2024 Comment     METHODS AND LIMITATIONS  07/03/2024 Comment     Information Table 07/03/2024 Comment     References: 07/03/2024 Comment     Director Review 07/03/2024 Comment     Ethnicity 07/03/2024 Comment     Specimen Type: 07/03/2024 Comment     Indication: 07/03/2024 Comment     Result 07/03/2024 Comment     Interpretation 07/03/2024 Comment     RECOMMENDATIONS 07/03/2024 Comment     Additional ClinicalInfor* 07/03/2024 Comment     COMMENTS 07/03/2024 Comment     METHODS AND LIMITATIONS  07/03/2024 Comment     Information Table 07/03/2024 Comment     References 07/03/2024 Comment     Director Review/Release 07/03/2024 Comment      Imaging:  US Ob < 14 Weeks Single or First Gestation  Impression:   IUP measuring 11w5d with appropriate fetal cardiac activity. ANIVAL is set at   1/21/25, consistent with 11w1d today, by last menstrual period. The cervix   and bilateral ovaries are normal in appearance. No free fluid in the   cul-de-sac.    Poonam Jackman MD   Obstetrics and Gynecology  [facility]      Medical Records:  None    Assessment and Plan:  Problem List Items Addressed This Visit    None  Visit Diagnoses       Encounter for supervision of other normal pregnancy, second trimester    -  Primary  Topics discussed:     ab precautions  genetic screening - Today we discussed genetic testing.  She is aware that the MSAFP-4 is a screening test.  A screening test is not a diagnostic test.   This means that a negative test does not guarantee an unaffected fetus and a positive test does not mean the fetus has the condition for which the test is being performed.  If the test returns positive, a diagnostic test should be consider to determine if the fetus in fact has the condition.  After considering the options previously presented, she is still undecided if she is interested in having genetic testing performed.  kick counts and fetal movement  PIH precautions  Anatomic scan next visit    Relevant Orders    US Ob 14 + Weeks Single or First Gestation    Nausea and vomiting during pregnancy prior to 22 weeks gestation      Patient to continue her current medication as noted.          Follow Up/Instructions:  Follow up as scheduled.  Patient was given instructions and counseling regarding her condition or for health maintenance advice. Please see specific information pulled into the AVS if appropriate.     Note: Speech recognition transcription software may have been used to dictate portions of this document.  An attempt at proofreading has been made though minor errors in transcription may still be present.    This note was electronically signed.  Jia Damico M.D.

## 2024-08-19 ENCOUNTER — ROUTINE PRENATAL (OUTPATIENT)
Dept: OBSTETRICS AND GYNECOLOGY | Facility: CLINIC | Age: 29
End: 2024-08-19
Payer: COMMERCIAL

## 2024-08-19 VITALS — SYSTOLIC BLOOD PRESSURE: 120 MMHG | DIASTOLIC BLOOD PRESSURE: 60 MMHG | WEIGHT: 219 LBS | BODY MASS INDEX: 31.42 KG/M2

## 2024-08-19 DIAGNOSIS — Z34.92 SECOND TRIMESTER PREGNANCY: Primary | ICD-10-CM

## 2024-08-19 PROCEDURE — 0502F SUBSEQUENT PRENATAL CARE: CPT | Performed by: OBSTETRICS & GYNECOLOGY

## 2024-08-19 NOTE — PROGRESS NOTES
Chief Complaint   Patient presents with    Routine Prenatal Visit     Prenatal visit with Anatomy scan done today. No problems or concerns        HPI:   , 17w6d gestation reports doing well    ROS:  See Prenatal Episode/Flowsheet  /60   Wt 99.3 kg (219 lb)   LMP 2024   BMI 31.42 kg/m²      EXAM:  EXTREMITIES:  No swelling-See Prenatal Episode/Flowsheet    ABDOMEN:  FHTs/Movement noted-See Prenatal Episode/Flowsheet    URINE GLUCOSE/PROTEIN:  See Prenatal Episode/Flowsheet    PELVIC EXAM:  See Prenatal Episode/Flowsheet  CV:  Lungs:  GYN:    MDM:    Lab Results   Component Value Date    HGB 12.5 2024    RUBELLAABIGG 10.00 2024    HEPBSAG Negative 2024    ABO O 2024    RH Positive 2024    ABSCRN Negative 2024    [id number] Non Reactive 2024    HEPCVIRUSABY Non Reactive 2024       U/S: Size consistent with dates.  Vertex.  Three-vessel cord.  Active male fetus.  Cardiac views profile not seen due to positioning    1. IUP 17w6d  2. Routine care   3.  Follow-up views next time

## 2024-09-16 ENCOUNTER — ROUTINE PRENATAL (OUTPATIENT)
Dept: OBSTETRICS AND GYNECOLOGY | Facility: CLINIC | Age: 29
End: 2024-09-16
Payer: COMMERCIAL

## 2024-09-16 VITALS — SYSTOLIC BLOOD PRESSURE: 122 MMHG | DIASTOLIC BLOOD PRESSURE: 70 MMHG | WEIGHT: 224 LBS | BODY MASS INDEX: 32.14 KG/M2

## 2024-09-16 DIAGNOSIS — O21.9 NAUSEA AND VOMITING DURING PREGNANCY PRIOR TO 22 WEEKS GESTATION: ICD-10-CM

## 2024-09-16 DIAGNOSIS — Z34.82 ENCOUNTER FOR SUPERVISION OF OTHER NORMAL PREGNANCY, SECOND TRIMESTER: Primary | ICD-10-CM

## 2024-09-16 DIAGNOSIS — Z36.2 ENCOUNTER FOR FOLLOW-UP ULTRASOUND OF FETAL ANATOMY: ICD-10-CM

## 2024-09-16 PROCEDURE — 0502F SUBSEQUENT PRENATAL CARE: CPT | Performed by: OBSTETRICS & GYNECOLOGY

## 2024-10-14 ENCOUNTER — ROUTINE PRENATAL (OUTPATIENT)
Dept: OBSTETRICS AND GYNECOLOGY | Facility: CLINIC | Age: 29
End: 2024-10-14
Payer: COMMERCIAL

## 2024-10-14 VITALS — WEIGHT: 227.6 LBS | DIASTOLIC BLOOD PRESSURE: 60 MMHG | BODY MASS INDEX: 32.66 KG/M2 | SYSTOLIC BLOOD PRESSURE: 110 MMHG

## 2024-10-14 DIAGNOSIS — Z3A.25 25 WEEKS GESTATION OF PREGNANCY: Primary | ICD-10-CM

## 2024-10-14 PROCEDURE — 0502F SUBSEQUENT PRENATAL CARE: CPT | Performed by: OBSTETRICS & GYNECOLOGY

## 2024-10-14 NOTE — PROGRESS NOTES
Chief Complaint   Patient presents with    Routine Prenatal Visit     Prenatal visit with Glucola done today. No problems or concerns.        HPI:   , 25w6d gestation reports doing well    ROS:  See Prenatal Episode/Flowsheet  /60   Wt 103 kg (227 lb 9.6 oz)   LMP 2024   BMI 32.66 kg/m²      EXAM:  EXTREMITIES:  No swelling-See Prenatal Episode/Flowsheet    ABDOMEN:  FHTs/Movement noted-See Prenatal Episode/Flowsheet    URINE GLUCOSE/PROTEIN:  See Prenatal Episode/Flowsheet    PELVIC EXAM:  See Prenatal Episode/Flowsheet  CV:  Lungs:  GYN:    MDM:    Lab Results   Component Value Date    HGB 12.5 2024    RUBELLAABIGG 10.00 2024    HEPBSAG Negative 2024    ABO O 2024    RH Positive 2024    ABSCRN Negative 2024    [id number] Non Reactive 2024    HEPCVIRUSABY Non Reactive 2024       U/S:US Ob Follow Up Transabdominal Approach (2024 14:34)     1. IUP 25w6d  2. Routine care   3. Glucola done today

## 2024-10-15 LAB
BASOPHILS # BLD AUTO: 0.01 10*3/MM3 (ref 0–0.2)
BASOPHILS NFR BLD AUTO: 0.1 % (ref 0–1.5)
EOSINOPHIL # BLD AUTO: 0.1 10*3/MM3 (ref 0–0.4)
EOSINOPHIL NFR BLD AUTO: 1.5 % (ref 0.3–6.2)
ERYTHROCYTE [DISTWIDTH] IN BLOOD BY AUTOMATED COUNT: 11.9 % (ref 12.3–15.4)
GLUCOSE 1H P 50 G GLC PO SERPL-MCNC: 106 MG/DL (ref 65–139)
HCT VFR BLD AUTO: 32.6 % (ref 34–46.6)
HGB BLD-MCNC: 10.5 G/DL (ref 12–15.9)
IMM GRANULOCYTES # BLD AUTO: 0.02 10*3/MM3 (ref 0–0.05)
IMM GRANULOCYTES NFR BLD AUTO: 0.3 % (ref 0–0.5)
LYMPHOCYTES # BLD AUTO: 1.24 10*3/MM3 (ref 0.7–3.1)
LYMPHOCYTES NFR BLD AUTO: 18.3 % (ref 19.6–45.3)
MCH RBC QN AUTO: 31.1 PG (ref 26.6–33)
MCHC RBC AUTO-ENTMCNC: 32.2 G/DL (ref 31.5–35.7)
MCV RBC AUTO: 96.4 FL (ref 79–97)
MONOCYTES # BLD AUTO: 0.48 10*3/MM3 (ref 0.1–0.9)
MONOCYTES NFR BLD AUTO: 7.1 % (ref 5–12)
NEUTROPHILS # BLD AUTO: 4.94 10*3/MM3 (ref 1.7–7)
NEUTROPHILS NFR BLD AUTO: 72.7 % (ref 42.7–76)
NRBC BLD AUTO-RTO: 0 /100 WBC (ref 0–0.2)
PLATELET # BLD AUTO: 202 10*3/MM3 (ref 140–450)
RBC # BLD AUTO: 3.38 10*6/MM3 (ref 3.77–5.28)
WBC # BLD AUTO: 6.79 10*3/MM3 (ref 3.4–10.8)

## 2024-10-15 RX ORDER — FERROUS SULFATE 325(65) MG
325 TABLET ORAL
Qty: 60 TABLET | Refills: 10 | Status: SHIPPED | OUTPATIENT
Start: 2024-10-15

## 2024-10-30 ENCOUNTER — TELEPHONE (OUTPATIENT)
Dept: OBSTETRICS AND GYNECOLOGY | Facility: CLINIC | Age: 29
End: 2024-10-30

## 2024-10-30 NOTE — TELEPHONE ENCOUNTER
Caller: MARIA EUGENIA MENEZES [facility]    Relationship: [facility]     Best call back number: [phone]    What is the best time to reach you: ANY    Who are you requesting to speak with (clinical staff, provider,  specific staff member): CLINICAL     What was the call regarding: PROVIDER CALLING TO DISCUSS IF DR BARRIGA WILL SIGN FOR PT BREAST PUMP. ORDERED FAXED 10/29/24  REF NUMBER; [id number]

## 2024-11-12 ENCOUNTER — ROUTINE PRENATAL (OUTPATIENT)
Dept: OBSTETRICS AND GYNECOLOGY | Facility: CLINIC | Age: 29
End: 2024-11-12
Payer: COMMERCIAL

## 2024-11-12 VITALS — BODY MASS INDEX: 33.72 KG/M2 | DIASTOLIC BLOOD PRESSURE: 64 MMHG | WEIGHT: 235 LBS | SYSTOLIC BLOOD PRESSURE: 122 MMHG

## 2024-11-12 DIAGNOSIS — Z34.93 THIRD TRIMESTER PREGNANCY: ICD-10-CM

## 2024-11-12 DIAGNOSIS — Z34.82 ENCOUNTER FOR SUPERVISION OF OTHER NORMAL PREGNANCY, SECOND TRIMESTER: Primary | ICD-10-CM

## 2024-11-12 NOTE — PROGRESS NOTES
Chief Complaint   Patient presents with    Routine Prenatal Visit     No complaints.         HPI:   , 30w0d gestation reports doing well    ROS:  See Prenatal Episode/Flowsheet  /64   Wt 107 kg (235 lb)   LMP 2024   BMI 33.72 kg/m²      EXAM:  EXTREMITIES:  No swelling-See Prenatal Episode/Flowsheet    ABDOMEN:  FHTs/Movement noted-See Prenatal Episode/Flowsheet    URINE GLUCOSE/PROTEIN:  See Prenatal Episode/Flowsheet    PELVIC EXAM:  See Prenatal Episode/Flowsheet  CV:  Lungs:  GYN:    MDM:    Lab Results   Component Value Date    HGB 10.5 (L) 10/14/2024    RUBELLAABIGG 10.00 2024    HEPBSAG Negative 2024    ABO O 2024    RH Positive 2024    ABSCRN Negative 2024    [id number] Non Reactive 2024    HEPCVIRUSABY Non Reactive 2024       U/S:    1. IUP 30w0d  2. Routine care   3. Anemia: taking Fe and PNV  4. Growth 2 weeks

## 2024-11-13 ENCOUNTER — PATIENT OUTREACH (OUTPATIENT)
Dept: LABOR AND DELIVERY | Facility: HOSPITAL | Age: 29
End: 2024-11-13
Payer: COMMERCIAL

## 2024-11-13 ENCOUNTER — REFERRAL TRIAGE (OUTPATIENT)
Dept: LABOR AND DELIVERY | Facility: HOSPITAL | Age: 29
End: 2024-11-13
Payer: COMMERCIAL

## 2024-11-13 NOTE — OUTREACH NOTE
[facility]  Enrollment    Current Estimated Gestational Age: 30w1d    Questions/Answers      Flowsheet Row Responses   Would like to participate? Yes   Date of Intake Visit 11/13/24                Indy BOOTH  Maternity Nurse Navigator    11/13/2024, 15:47 EST

## 2024-11-26 ENCOUNTER — ROUTINE PRENATAL (OUTPATIENT)
Dept: OBSTETRICS AND GYNECOLOGY | Facility: CLINIC | Age: 29
End: 2024-11-26
Payer: COMMERCIAL

## 2024-11-26 VITALS — SYSTOLIC BLOOD PRESSURE: 120 MMHG | WEIGHT: 236 LBS | BODY MASS INDEX: 33.86 KG/M2 | DIASTOLIC BLOOD PRESSURE: 70 MMHG

## 2024-11-26 DIAGNOSIS — O99.019 IRON DEFICIENCY ANEMIA DURING PREGNANCY: ICD-10-CM

## 2024-11-26 DIAGNOSIS — D50.9 IRON DEFICIENCY ANEMIA DURING PREGNANCY: ICD-10-CM

## 2024-11-26 DIAGNOSIS — Z34.83 ENCOUNTER FOR SUPERVISION OF OTHER NORMAL PREGNANCY, THIRD TRIMESTER: Primary | ICD-10-CM

## 2024-11-26 DIAGNOSIS — O36.63X0 EXCESSIVE FETAL GROWTH AFFECTING MANAGEMENT OF PREGNANCY IN THIRD TRIMESTER, SINGLE OR UNSPECIFIED FETUS: ICD-10-CM

## 2024-11-27 ENCOUNTER — PATIENT OUTREACH (OUTPATIENT)
Dept: LABOR AND DELIVERY | Facility: HOSPITAL | Age: 29
End: 2024-11-27
Payer: COMMERCIAL

## 2024-11-27 LAB
ERYTHROCYTE [DISTWIDTH] IN BLOOD BY AUTOMATED COUNT: 11.7 % (ref 12.3–15.4)
HCT VFR BLD AUTO: 34.1 % (ref 34–46.6)
HGB BLD-MCNC: 11.6 G/DL (ref 12–15.9)
MCH RBC QN AUTO: 31.4 PG (ref 26.6–33)
MCHC RBC AUTO-ENTMCNC: 34 G/DL (ref 31.5–35.7)
MCV RBC AUTO: 92.2 FL (ref 79–97)
PLATELET # BLD AUTO: 203 10*3/MM3 (ref 140–450)
RBC # BLD AUTO: 3.7 10*6/MM3 (ref 3.77–5.28)
WBC # BLD AUTO: 9.43 10*3/MM3 (ref 3.4–10.8)

## 2024-11-27 NOTE — OUTREACH NOTE
[facility]  Intake    Current Estimated Gestational Age: 32w1d    Intake Assessment      Flowsheet Row Responses   Best Method for Contacting Cell   Currently Employed Yes   Able to keep appointments as scheduled No   Gender(s) and Name(s) Boy-   Do you have a dentist? No   Resources Presently Utilizing: WIC (Women, Infant, Children)   Maternal Warning Signs Provided   Other: Provided   Comments LH tour given today. Met with lactation counselor and educated provided about breastfeeding. Educated about birthing class, breastfeeding, labor and maternal warning signs.              Met with pt on LH. RN educated pt on labor processes, LH tour provided, Maternal warning signs, and hospital safety. PT VU. RN encouraged question, answered questions, and encouraged pt to contact RN with any questions or concerns. RN to send pt education through [employer].     Tobacco, Alcohol, and Drug History     reports that she has never smoked. She has never been exposed to tobacco smoke. She has never used smokeless tobacco.   reports that she does not currently use alcohol.   reports no history of drug use.    Indy BOOTH  Maternity Nurse Navigator    11/27/2024, 14:37 EST

## 2024-12-03 NOTE — PROGRESS NOTES
Chief Complaint  Routine Prenatal Visit (Growth scan today, no complaints. )    History of Present Illness:  Conchita is a  currently at 32w0d who presents today with no significant complaints.  Patient does report good fetal movement.  She denies any significant cramping or contractions.  She does report good fetal movement.  She has been taking her prenatal vitamins as well as iron supplements.    Exam:  Vitals:  See prenatal flowsheet as noted and reviewed  General: Alert, cooperative, and does not appear in any distress  Abdomen:   See prenatal flowsheet as noted and reviewed    Uterus gravid, non-tender; no palpable masses    No guarding or rebound tenderness  Pelvic:  See prenatal flowsheet as noted and reviewed  Ext:  See prenatal flowsheet as noted and reviewed    Moves extremities well, no cyanosis and no redness  Urine:  See prenatal flowsheet as noted and reviewed    Data Review:  The following data was reviewed by: Jia Damico MD on 2024:  Prenatal Labs:  Lab Results   Component Value Date    HGB 11.6 (L) 2024    RUBELLAABIGG 10.00 2024    HEPBSAG Negative 2024    ABO O 2024    RH Positive 2024    ABSCRN Negative 2024    [id number] Non Reactive 2024    HEPCVIRUSABY Non Reactive 2024       Routine Prenatal on 2024   Component Date Value    WBC 2024 9.43     RBC 2024 3.70 (L)     Hemoglobin 2024 11.6 (L)     Hematocrit 2024 34.1     MCV 2024 92.2     MCH 2024 31.4     MCHC 2024 34.0     RDW 2024 11.7 (L)     Platelets 2024 203      Imaging:  US Ob Follow Up Transabdominal Approach  Conchita Perez  : 1995  MRN: [id number]  Date: 2024    Reason for exam/History:  Growth    Ultrasound images are reviewed.  There is noted to be a viable   intrauterine pregnancy. The pregnancy is measuring 34 weeks 6 days   gestation.  The estimated fetal weight is 2431 grams at the >96.7 % for    growth.  The HC was at the 97.6 % and the AC was at the 98 %.  The   amniotic fluid index was 17.37 cms.  The fetal heart rate was normal.     The infant was in the vertex presentation.  The placental location was   noted to be anterior.      The exam limitations noted:  none    See ultrasound report for measurements and structures identified.    Jia Damico MD, [facility]  OB GYN [place]      Medical Records:  None    Assessment and Plan:  Problem List Items Addressed This Visit    None  Visit Diagnoses       Encounter for supervision of other normal pregnancy, third trimester    -  Primary  Topics discussed:     iron supplementation  kick counts and fetal movement  PIH precautions   labor signs and symptoms  Scan for growth as noted; recommend repeat at 36 to 37 weeks.    Relevant Orders    CBC (No Diff) (Completed)    Iron deficiency anemia during pregnancy      Patient to continue her iron supplements.  CBC today.    Relevant Orders    CBC (No Diff) (Completed)    Excessive fetal growth affecting management of pregnancy in third trimester, single or unspecified fetus      Patient inform regarding the findings on ultrasound.  Recommend patient repeat at 36 to 37 weeks gestation.          Follow Up/Instructions:  Follow up as scheduled.  Patient was given instructions and counseling regarding her condition or for health maintenance advice. Please see specific information pulled into the AVS if appropriate.     Note: Speech recognition transcription software may have been used to dictate portions of this document.  An attempt at proofreading has been made though minor errors in transcription may still be present.    This note was electronically signed.  Jia Damico M.D.

## 2024-12-10 ENCOUNTER — ROUTINE PRENATAL (OUTPATIENT)
Dept: OBSTETRICS AND GYNECOLOGY | Facility: CLINIC | Age: 29
End: 2024-12-10
Payer: COMMERCIAL

## 2024-12-10 VITALS — BODY MASS INDEX: 34.44 KG/M2 | WEIGHT: 240 LBS | DIASTOLIC BLOOD PRESSURE: 70 MMHG | SYSTOLIC BLOOD PRESSURE: 122 MMHG

## 2024-12-10 DIAGNOSIS — O36.63X0 EXCESSIVE FETAL GROWTH AFFECTING MANAGEMENT OF PREGNANCY IN THIRD TRIMESTER, SINGLE OR UNSPECIFIED FETUS: ICD-10-CM

## 2024-12-10 DIAGNOSIS — Z34.93 PRENATAL CARE IN THIRD TRIMESTER: Primary | ICD-10-CM

## 2024-12-11 PROBLEM — O36.63X0 EXCESSIVE FETAL GROWTH AFFECTING MANAGEMENT OF MOTHER IN THIRD TRIMESTER, ANTEPARTUM: Status: ACTIVE | Noted: 2024-12-11

## 2024-12-11 NOTE — PROGRESS NOTES
Prenatal Care Visit    Subjective   Chief Complaint   Patient presents with    Routine Prenatal Visit     No complaints, wants to discuss labs.        History:   Conchita is a  currently at 34w0d who presents for a prenatal care visit today.    No major issues.    Social History    Tobacco Use      Smoking status: Never        Passive exposure: Never      Smokeless tobacco: Never       Objective   /70   Wt 109 kg (240 lb)   LMP 2024   BMI 34.44 kg/m²   Physical Exam:  Normal, gestational age-appropriate exam today        Plan   Medical Decision Making:    I have reviewed the prenatal labs and ultrasound(s) today. I have reviewed the most recent prenatal progress note(s).    Diagnosis: Supervision of low risk pregnancy  LGA   Tests/Orders/Rx today: No orders of the defined types were placed in this encounter.      Medication Management: none     Topics discussed: Prenatal care milestones  kick counts and fetal movement  PIH precautions   labor signs and symptoms  Recent U/S findings   Tests next visit: U/S for EFW   Next visit: 2 week(s)     Raymundo Sanford MD  Obstetrics and Gynecology  [facility]

## 2024-12-27 ENCOUNTER — ROUTINE PRENATAL (OUTPATIENT)
Dept: OBSTETRICS AND GYNECOLOGY | Facility: CLINIC | Age: 29
End: 2024-12-27
Payer: COMMERCIAL

## 2024-12-27 ENCOUNTER — PATIENT OUTREACH (OUTPATIENT)
Dept: LABOR AND DELIVERY | Facility: HOSPITAL | Age: 29
End: 2024-12-27
Payer: COMMERCIAL

## 2024-12-27 VITALS — BODY MASS INDEX: 34.72 KG/M2 | WEIGHT: 242 LBS | DIASTOLIC BLOOD PRESSURE: 78 MMHG | SYSTOLIC BLOOD PRESSURE: 120 MMHG

## 2024-12-27 DIAGNOSIS — Z34.83 ENCOUNTER FOR SUPERVISION OF OTHER NORMAL PREGNANCY, THIRD TRIMESTER: Primary | ICD-10-CM

## 2024-12-27 DIAGNOSIS — O36.63X0 EXCESSIVE FETAL GROWTH AFFECTING MANAGEMENT OF PREGNANCY IN THIRD TRIMESTER, SINGLE OR UNSPECIFIED FETUS: ICD-10-CM

## 2024-12-27 DIAGNOSIS — D50.9 IRON DEFICIENCY ANEMIA DURING PREGNANCY: ICD-10-CM

## 2024-12-27 DIAGNOSIS — R60.0 BILATERAL LEG EDEMA: ICD-10-CM

## 2024-12-27 DIAGNOSIS — O99.019 IRON DEFICIENCY ANEMIA DURING PREGNANCY: ICD-10-CM

## 2024-12-27 NOTE — PROGRESS NOTES
Chief Complaint  Pregnancy Ultrasound (Growth scan done today. GBS done)    History of Present Illness:  Conchita is a  currently at 36w3d who presents today with no significant complaints other than lower extremity edema bilaterally.  Patient denies any headaches or visual disturbances.  She denies any significant cramping or contractions.  Patient has continued working in [employer].  She would like to consider changing to working from home.  She denies any vaginal bleeding or spotting.  Scan is obtained today for growth as noted.    Exam:  Vitals:  See prenatal flowsheet as noted and reviewed  General: Alert, cooperative, and does not appear in any distress  Abdomen:   See prenatal flowsheet as noted and reviewed    Uterus gravid, non-tender; no palpable masses    No guarding or rebound tenderness  Pelvic:  See prenatal flowsheet as noted and reviewed  Ext:  See prenatal flowsheet as noted and reviewed    Moves extremities well, no cyanosis and no redness  Urine:  See prenatal flowsheet as noted and reviewed    Data Review:  The following data was reviewed by: Jia Damico MD on 2024:  Prenatal Labs:  Lab Results   Component Value Date    HGB 11.6 (L) 2024    RUBELLAABIGG 10.00 2024    HEPBSAG Negative 2024    ABO O 2024    RH Positive 2024    ABSCRN Negative 2024    [id number] Non Reactive 2024    HEPCVIRUSABY Non Reactive 2024       No visits with results within 1 Month(s) from this visit.   Latest known visit with results is:   Routine Prenatal on 2024   Component Date Value    WBC 2024 9.43     RBC 2024 3.70 (L)     Hemoglobin 2024 11.6 (L)     Hematocrit 2024 34.1     MCV 2024 92.2     MCH 2024 31.4     MCHC 2024 34.0     RDW 2024 11.7 (L)     Platelets 2024 203      Imaging:  US Ob Follow Up Transabdominal Approach  Conchita Perez  : 1995  MRN: [id number]  Date:  2024    Reason for exam/History:  Growth, LGA    Ultrasound images are reviewed.  There is noted to be a viable   intrauterine pregnancy. The pregnancy is measuring 38 weeks 1 days   gestation.  The estimated fetal weight is 3323 grams at the 86.1 % for   growth.  The HC was at the 85.1 % and the AC was at the 93.2 %.  The   amniotic fluid index was 21.49 cms.  The fetal heart rate was normal.     The infant was in the vertex presentation.  The placental location was   noted to be anterior.      The exam limitations noted:  none    See ultrasound report for measurements and structures identified.    Jia Damico MD, [facility]  OB GYN [place]      Medical Records:  None    Assessment and Plan:  Problem List Items Addressed This Visit          Gravid and     Excessive fetal growth affecting management of mother in third trimester, antepartum  Patient inform regarding the findings.     Other Visit Diagnoses       Encounter for supervision of other normal pregnancy, third trimester    -  Primary  Topics discussed:     iron supplementation  kick counts and fetal movement  labor signs and symptoms  PIH precautions  Given for work to start working from home starting on .  GBS obtained.    Relevant Orders    Group B Streptococcus Culture - Swab, Vaginal/Rectum    Iron deficiency anemia during pregnancy      Patient to continue her prenatal vitamins and iron supplements.    Bilateral leg edema      Recommend elevation of her feet and low-sodium diet.  Patient to increase p.o. fluids.  Note given for work as noted.          Follow Up/Instructions:  Follow up as scheduled.  Patient was given instructions and counseling regarding her condition or for health maintenance advice. Please see specific information pulled into the AVS if appropriate.     Note: Speech recognition transcription software may have been used to dictate portions of this document.  An attempt at proofreading has  been made though minor errors in transcription may still be present.    This note was electronically signed.  Jia Damico M.D.

## 2024-12-27 NOTE — OUTREACH NOTE
[facility]  Check-In    Current Estimated Gestational Age: 36w3d      Questions/Answers      Flowsheet Row Responses   Best Method for Contacting Cell   Demographics Reviewed Yes   Currently Employed Yes   Able to keep appointments as scheduled No   Gender(s) and Name(s) Boy-   Baby Active/Feeling Fetal Movemen Yes   How are you presently feeling? Good   Questions regarding prenatal visits or tests to be ordered? No   Education related to new diagnoses/home equipment No   Resource/Environmental Concerns None   Do you have any questions related to your care experience, your pregnancy, plans for delivery, any concerns, etc? No   Other Education HANDS, How to find a pediatrician, How to find a primary care provider, Insurance benefits/Incentives, Meds to Beds, WIC Benefits            Met with pt in office. Pt states everything is going well. Pt denies any questions, concerns, or needs at this time.     Indy BOOTH  Maternity Nurse Navigator    12/27/2024, 16:18 EST

## 2024-12-31 LAB — B-HEM STREP SPEC QL CULT: NEGATIVE

## 2025-01-03 ENCOUNTER — ROUTINE PRENATAL (OUTPATIENT)
Dept: OBSTETRICS AND GYNECOLOGY | Facility: CLINIC | Age: 30
End: 2025-01-03
Payer: COMMERCIAL

## 2025-01-03 VITALS — BODY MASS INDEX: 35.33 KG/M2 | WEIGHT: 246.2 LBS | SYSTOLIC BLOOD PRESSURE: 120 MMHG | DIASTOLIC BLOOD PRESSURE: 64 MMHG

## 2025-01-03 DIAGNOSIS — O36.63X0 EXCESSIVE FETAL GROWTH AFFECTING MANAGEMENT OF PREGNANCY IN THIRD TRIMESTER, SINGLE OR UNSPECIFIED FETUS: Primary | ICD-10-CM

## 2025-01-03 DIAGNOSIS — Z34.83 ENCOUNTER FOR SUPERVISION OF OTHER NORMAL PREGNANCY IN THIRD TRIMESTER: ICD-10-CM

## 2025-01-03 NOTE — PROGRESS NOTES
Chief Complaint   Patient presents with    Routine Prenatal Visit     Cervix check        HPI: Conchita is a  currently at 37w3d here for prenatal visit who reports the following:  Baby is active. She wants baby circumcised and plans to breast feed. Plans are open as to pain meds in labor.                EXAM:     Vitals:    25 1045   BP: 120/64      Abdomen:   See prenatal flowsheet as noted and reviewed, soft, nontender   Pelvic:  FT/50%/-3 very posterior   Urine:  See prenatal flowsheet as noted and reviewed    Lab Results   Component Value Date    ABO O 2024    RH Positive 2024    ABSCRN Negative 2024       MDM:  Impression: Supervision of low risk pregnancy  Anemia in pregnancy   Tests done today: none   Topics discussed: breast feeding  kick counts and fetal movement  labor signs and symptoms  pain management options for labor  Reviewed OB labs   Tests next visit: none                RTO:                        1 weeks    This note was electronically signed.  Rand Singh, KARIN  1/3/2025   Saucerization Excision Additional Text (Leave Blank If You Do Not Want): The margin was drawn around the clinically apparent lesion.  Incisions were then made along these lines, in a tangential fashion, to the appropriate tissue plane and the lesion was extirpated.

## 2025-01-09 ENCOUNTER — ROUTINE PRENATAL (OUTPATIENT)
Dept: OBSTETRICS AND GYNECOLOGY | Facility: CLINIC | Age: 30
End: 2025-01-09
Payer: COMMERCIAL

## 2025-01-09 VITALS — WEIGHT: 247 LBS | BODY MASS INDEX: 35.44 KG/M2 | DIASTOLIC BLOOD PRESSURE: 80 MMHG | SYSTOLIC BLOOD PRESSURE: 130 MMHG

## 2025-01-09 DIAGNOSIS — Z34.93 THIRD TRIMESTER PREGNANCY: Primary | ICD-10-CM

## 2025-01-09 NOTE — PROGRESS NOTES
Chief Complaint   Patient presents with    Routine Prenatal Visit     Prenatal visit with complaints of headache that started yesterday.        HPI:   , 38w2d gestation reports doing well    ROS:  See Prenatal Episode/Flowsheet  /80   Wt 112 kg (247 lb)   LMP 2024   BMI 35.44 kg/m²      EXAM:  EXTREMITIES:  No swelling-See Prenatal Episode/Flowsheet    ABDOMEN:  FHTs/Movement noted-See Prenatal Episode/Flowsheet    URINE GLUCOSE/PROTEIN:  See Prenatal Episode/Flowsheet    PELVIC EXAM:  See Prenatal Episode/Flowsheet  CV:  Lungs:  GYN:    MDM:    Lab Results   Component Value Date    HGB 11.6 (L) 2024    RUBELLAABIGG 10.00 2024    HEPBSAG Negative 2024    ABO O 2024    RH Positive 2024    ABSCRN Negative 2024    [id number] Non Reactive 2024    HEPCVIRUSABY Non Reactive 2024    STREPGPB Negative 2024       U/S:US Ob Follow Up Transabdominal Approach (2024 13:47)     1. IUP 38w2d  2. Routine care   3, Cervix: maybe Ft, ballot  4. GBS negative

## 2025-01-13 ENCOUNTER — ROUTINE PRENATAL (OUTPATIENT)
Dept: OBSTETRICS AND GYNECOLOGY | Facility: CLINIC | Age: 30
End: 2025-01-13
Payer: COMMERCIAL

## 2025-01-13 VITALS — WEIGHT: 247 LBS | SYSTOLIC BLOOD PRESSURE: 122 MMHG | BODY MASS INDEX: 35.44 KG/M2 | DIASTOLIC BLOOD PRESSURE: 72 MMHG

## 2025-01-13 DIAGNOSIS — Z34.83 ENCOUNTER FOR SUPERVISION OF OTHER NORMAL PREGNANCY IN THIRD TRIMESTER: ICD-10-CM

## 2025-01-13 DIAGNOSIS — Z34.93 PRENATAL CARE IN THIRD TRIMESTER: Primary | ICD-10-CM

## 2025-01-13 PROCEDURE — 0502F SUBSEQUENT PRENATAL CARE: CPT | Performed by: OBSTETRICS & GYNECOLOGY

## 2025-01-13 NOTE — PROGRESS NOTES
Prenatal Care Visit    Subjective   Chief Complaint   Patient presents with    Routine Prenatal Visit     Patient complains of cramping and pressure.        History:   Conchita is a  currently at 38w6d who presents for a prenatal care visit today.    No major issues.    Social History    Tobacco Use      Smoking status: Never        Passive exposure: Never      Smokeless tobacco: Never       Objective   /72   Wt 112 kg (247 lb)   LMP 2024   BMI 35.44 kg/m²   Physical Exam:  Normal, gestational age-appropriate exam today   SVE 0.5/25/-3       Plan   Medical Decision Making:    I have reviewed the prenatal labs and ultrasound(s) today. I have reviewed the most recent prenatal progress note(s).    Diagnosis: Supervision of low risk pregnancy  LGA   Tests/Orders/Rx today: No orders of the defined types were placed in this encounter.      Medication Management: none     Topics discussed: Prenatal care milestones  induction of labor  kick counts and fetal movement  labor signs and symptoms  PIH precautions  Recent U/S findings   Tests next visit: none   Next visit: 1 week(s)     Raymundo Sanford MD  Obstetrics and Gynecology  [facility]

## 2025-01-20 ENCOUNTER — ROUTINE PRENATAL (OUTPATIENT)
Dept: OBSTETRICS AND GYNECOLOGY | Facility: CLINIC | Age: 30
End: 2025-01-20
Payer: COMMERCIAL

## 2025-01-20 VITALS — WEIGHT: 255 LBS | BODY MASS INDEX: 36.59 KG/M2 | DIASTOLIC BLOOD PRESSURE: 78 MMHG | SYSTOLIC BLOOD PRESSURE: 120 MMHG

## 2025-01-20 DIAGNOSIS — Z34.93 PRENATAL CARE IN THIRD TRIMESTER: Primary | ICD-10-CM

## 2025-01-20 NOTE — PROGRESS NOTES
Prenatal Care Visit    Subjective   Chief Complaint   Patient presents with    Routine Prenatal Visit     Patient would like her cervix checked today. Patient has no other concerns.      History:   Conchita is a  currently at 39w6d who presents for a prenatal care visit today.    Denies regular CTX, VB, LOF. Reports (+) FM.       Objective   /78   Wt 116 kg (255 lb)   LMP 2024   BMI 36.59 kg/m²   Physical Exam:  Normal, gestational age-appropriate exam today   CVX: fingertip/50/-4, posterior, softening       Assessment & Plan     IUP @ 39w6d  Routine care: I have reviewed the prenatal labs and ultrasound(s) today. I have reviewed the most recent prenatal progress note(s). GBS (-).  Suspected LGA: EFW 86%, AC 93% at 36 wks. Approx 3900g currently.     Diagnosis Plan   1. Prenatal care in third trimester             Medication Management: continue PNV    Topics discussed: Prenatal care milestones  Kick counts and fetal movement  Labor signs and symptoms  Birth plan  Induction of labor - would like to await spontaneous labor. Discussed risks/ benefits.    Tests next visit: none   Next visit: 1 week(s)     Poonam Jackman MD  Obstetrics and Gynecology  [facility]

## 2025-01-24 ENCOUNTER — PREP FOR SURGERY (OUTPATIENT)
Dept: OTHER | Facility: HOSPITAL | Age: 30
End: 2025-01-24
Payer: COMMERCIAL

## 2025-01-24 ENCOUNTER — ROUTINE PRENATAL (OUTPATIENT)
Dept: OBSTETRICS AND GYNECOLOGY | Facility: CLINIC | Age: 30
End: 2025-01-24
Payer: COMMERCIAL

## 2025-01-24 ENCOUNTER — HOSPITAL ENCOUNTER (OUTPATIENT)
Facility: HOSPITAL | Age: 30
Discharge: HOME | End: 2025-01-24
Attending: MIDWIFE | Admitting: MIDWIFE
Payer: COMMERCIAL

## 2025-01-24 VITALS
RESPIRATION RATE: 16 BRPM | OXYGEN SATURATION: 97 % | HEART RATE: 74 BPM | HEIGHT: 70 IN | SYSTOLIC BLOOD PRESSURE: 114 MMHG | WEIGHT: 253.6 LBS | TEMPERATURE: 98.5 F | DIASTOLIC BLOOD PRESSURE: 58 MMHG | BODY MASS INDEX: 36.31 KG/M2

## 2025-01-24 VITALS — DIASTOLIC BLOOD PRESSURE: 90 MMHG | SYSTOLIC BLOOD PRESSURE: 142 MMHG | BODY MASS INDEX: 36.3 KG/M2 | WEIGHT: 253 LBS

## 2025-01-24 DIAGNOSIS — Z34.90 ENCOUNTER FOR INDUCTION OF LABOR: Primary | ICD-10-CM

## 2025-01-24 DIAGNOSIS — O36.63X0 EXCESSIVE FETAL GROWTH AFFECTING MANAGEMENT OF PREGNANCY IN THIRD TRIMESTER, SINGLE OR UNSPECIFIED FETUS: Primary | ICD-10-CM

## 2025-01-24 PROBLEM — O47.03 PRETERM UTERINE CONTRACTIONS IN THIRD TRIMESTER, ANTEPARTUM: Status: RESOLVED | Noted: 2025-01-24 | Resolved: 2025-01-24

## 2025-01-24 PROBLEM — O47.03 PRETERM UTERINE CONTRACTIONS IN THIRD TRIMESTER, ANTEPARTUM: Status: ACTIVE | Noted: 2025-01-24

## 2025-01-24 LAB
BILIRUB BLD-MCNC: NEGATIVE MG/DL
CLARITY, POC: CLEAR
COLOR UR: YELLOW
GLUCOSE UR STRIP-MCNC: NEGATIVE MG/DL
KETONES UR QL: NEGATIVE
LEUKOCYTE EST, POC: NEGATIVE
NITRITE UR-MCNC: NEGATIVE MG/ML
PH UR: 7.5 [PH] (ref 5–8)
PROT UR STRIP-MCNC: NEGATIVE MG/DL
RBC # UR STRIP: NEGATIVE /UL
SP GR UR: 1.01 (ref 1–1.03)
UROBILINOGEN UR QL: NORMAL

## 2025-01-24 PROCEDURE — 81002 URINALYSIS NONAUTO W/O SCOPE: CPT | Performed by: MIDWIFE

## 2025-01-24 PROCEDURE — G0463 HOSPITAL OUTPT CLINIC VISIT: HCPCS

## 2025-01-24 PROCEDURE — 59025 FETAL NON-STRESS TEST: CPT

## 2025-01-24 RX ORDER — HYDROXYZINE HYDROCHLORIDE 25 MG/1
50 TABLET, FILM COATED ORAL NIGHTLY PRN
OUTPATIENT
Start: 2025-01-24

## 2025-01-24 RX ORDER — PROMETHAZINE HYDROCHLORIDE 12.5 MG/1
12.5 TABLET ORAL EVERY 6 HOURS PRN
OUTPATIENT
Start: 2025-01-24

## 2025-01-24 RX ORDER — MISOPROSTOL 200 UG/1
800 TABLET ORAL AS NEEDED
OUTPATIENT
Start: 2025-01-24

## 2025-01-24 RX ORDER — SODIUM CHLORIDE 0.9 % (FLUSH) 0.9 %
10 SYRINGE (ML) INJECTION AS NEEDED
OUTPATIENT
Start: 2025-01-24

## 2025-01-24 RX ORDER — ONDANSETRON 2 MG/ML
4 INJECTION INTRAMUSCULAR; INTRAVENOUS EVERY 6 HOURS PRN
OUTPATIENT
Start: 2025-01-24

## 2025-01-24 RX ORDER — OXYTOCIN/0.9 % SODIUM CHLORIDE 30/500 ML
1-20 PLASTIC BAG, INJECTION (ML) INTRAVENOUS
OUTPATIENT
Start: 2025-01-24

## 2025-01-24 RX ORDER — ONDANSETRON 2 MG/ML
4 INJECTION INTRAMUSCULAR; INTRAVENOUS ONCE AS NEEDED
OUTPATIENT
Start: 2025-01-24

## 2025-01-24 RX ORDER — SODIUM CHLORIDE, SODIUM LACTATE, POTASSIUM CHLORIDE, CALCIUM CHLORIDE 600; 310; 30; 20 MG/100ML; MG/100ML; MG/100ML; MG/100ML
30 INJECTION, SOLUTION INTRAVENOUS CONTINUOUS
OUTPATIENT
Start: 2025-01-24 | End: 2025-01-26

## 2025-01-24 RX ORDER — PROMETHAZINE HYDROCHLORIDE 12.5 MG/1
12.5 SUPPOSITORY RECTAL EVERY 6 HOURS PRN
OUTPATIENT
Start: 2025-01-24

## 2025-01-24 RX ORDER — HYDROCODONE BITARTRATE AND ACETAMINOPHEN 5; 325 MG/1; MG/1
1 TABLET ORAL EVERY 4 HOURS PRN
OUTPATIENT
Start: 2025-01-24 | End: 2025-01-31

## 2025-01-24 RX ORDER — SODIUM CHLORIDE 0.9 % (FLUSH) 0.9 %
10 SYRINGE (ML) INJECTION EVERY 12 HOURS SCHEDULED
OUTPATIENT
Start: 2025-01-24

## 2025-01-24 RX ORDER — ONDANSETRON 4 MG/1
4 TABLET, ORALLY DISINTEGRATING ORAL EVERY 6 HOURS PRN
OUTPATIENT
Start: 2025-01-24

## 2025-01-24 RX ORDER — METHYLERGONOVINE MALEATE 0.2 MG/ML
200 INJECTION INTRAVENOUS ONCE AS NEEDED
OUTPATIENT
Start: 2025-01-24

## 2025-01-24 RX ORDER — CARBOPROST TROMETHAMINE 250 UG/ML
250 INJECTION, SOLUTION INTRAMUSCULAR AS NEEDED
OUTPATIENT
Start: 2025-01-24

## 2025-01-24 RX ORDER — ONDANSETRON 4 MG/1
4 TABLET, ORALLY DISINTEGRATING ORAL ONCE AS NEEDED
OUTPATIENT
Start: 2025-01-24

## 2025-01-24 RX ORDER — ACETAMINOPHEN 325 MG/1
650 TABLET ORAL ONCE AS NEEDED
OUTPATIENT
Start: 2025-01-24

## 2025-01-24 RX ORDER — MORPHINE SULFATE 2 MG/ML
2 INJECTION, SOLUTION INTRAMUSCULAR; INTRAVENOUS ONCE AS NEEDED
OUTPATIENT
Start: 2025-01-24

## 2025-01-24 RX ORDER — OXYTOCIN/0.9 % SODIUM CHLORIDE 30/500 ML
999 PLASTIC BAG, INJECTION (ML) INTRAVENOUS ONCE
OUTPATIENT
Start: 2025-01-24 | End: 2025-01-24

## 2025-01-24 RX ORDER — ACETAMINOPHEN 325 MG/1
650 TABLET ORAL EVERY 4 HOURS PRN
OUTPATIENT
Start: 2025-01-24

## 2025-01-24 RX ORDER — SODIUM CHLORIDE 9 MG/ML
40 INJECTION, SOLUTION INTRAVENOUS AS NEEDED
OUTPATIENT
Start: 2025-01-24

## 2025-01-24 RX ORDER — LIDOCAINE HYDROCHLORIDE 10 MG/ML
0.5 INJECTION, SOLUTION INFILTRATION; PERINEURAL ONCE AS NEEDED
OUTPATIENT
Start: 2025-01-24

## 2025-01-24 RX ORDER — OXYTOCIN/0.9 % SODIUM CHLORIDE 30/500 ML
250 PLASTIC BAG, INJECTION (ML) INTRAVENOUS CONTINUOUS
OUTPATIENT
Start: 2025-01-24 | End: 2025-01-24

## 2025-01-24 NOTE — NON STRESS TEST
Triage Note - Nursing Documentation  Labor and Delivery Admission Log    Conchita Perez  : 1995  MRN: [id number]  CSN: [id number]    Date in / Time in:  2025  Time in: 1122    Date out / Time out:    Time out: 1305    Nurse: Brittni Carrasco RN    Patient Info: She is a 29 y.o. year old  at 40w3d with an ANIVAL of 2025, by Last Menstrual Period who was seen on the [facility].    Chief Complaint:   Chief Complaint   Patient presents with    Elevated Blood Pressure     Sent from office for NST and increased blood        Provider Instructions / Disposition:     Patient educated on signs/symptoms of labor, preeclampsia, fetal movement and PO hydration. KARIN [place], discussed with patient Induction of Labor. Patient induction scheduled for 2025 @1600. Patient verbalized understanding to instructions and signed. Induction of labor consent signed. Patient instructed if any change in condition; patient to return to [facility] for assessment/evaluation. Patient discharged home in stable condition. - Brittni Carrasco RN.    Patient Active Problem List   Diagnosis    Migraine without aura and without status migrainosus, not intractable    Anxiety    Acne vulgaris    Missed     Excessive fetal growth affecting management of mother in third trimester, antepartum     uterine contractions in third trimester, antepartum       NST Documentation (Only applicable > 32 weeks): Interpretation A  Nonstress Test Interpretation A: Reactive (25 1259 : Brittni Carrasco, RN)

## 2025-01-24 NOTE — PROGRESS NOTES
Chief Complaint   Patient presents with    Routine Prenatal Visit     No Complaints/concerns        HPI: Conchita is a  currently at 40w3d here for prenatal visit who reports the following:  Baby is active. She hasn't had many ctxs. She wants to discuss induction. She denies any headache, visual disturbances epigastric pain, or edema                EXAM:     Vitals:    25 1050   BP: 142/90      Abdomen:   See prenatal flowsheet as noted and reviewed, soft, nontender   Pelvic:  FT/50%/-3   Urine:  See prenatal flowsheet as noted and reviewed    Lab Results   Component Value Date    ABO O 2024    RH Positive 2024    ABSCRN Negative 2024       MDM:  Impression: Supervision of low risk pregnancy  Elevated blood pressure  Anemia in pregnancy   Tests done today: To  for NST and serial BPs. Will discuss induction after monitoring   Topics discussed: induction of labor  kick counts and fetal movement  labor signs and symptoms  Reviewed OB labs   Tests next visit: none                RTO:                        Induction 25    This note was electronically signed.  Rand Singh, KARIN  2025

## 2025-01-28 ENCOUNTER — PATIENT OUTREACH (OUTPATIENT)
Dept: LABOR AND DELIVERY | Facility: HOSPITAL | Age: 30
End: 2025-01-28
Payer: COMMERCIAL

## 2025-01-28 ENCOUNTER — HOSPITAL ENCOUNTER (INPATIENT)
Facility: HOSPITAL | Age: 30
LOS: 3 days | Discharge: HOME | End: 2025-01-31
Attending: MIDWIFE | Admitting: MIDWIFE
Payer: COMMERCIAL

## 2025-01-28 ENCOUNTER — HOSPITAL ENCOUNTER (OUTPATIENT)
Dept: LABOR AND DELIVERY | Facility: HOSPITAL | Age: 30
Discharge: HOME | End: 2025-01-28
Payer: COMMERCIAL

## 2025-01-28 DIAGNOSIS — Z34.90 ENCOUNTER FOR INDUCTION OF LABOR: ICD-10-CM

## 2025-01-28 DIAGNOSIS — O36.8390 NON-REASSURING FETAL HEART TONES COMPLICATING PREGNANCY, ANTEPARTUM: Primary | ICD-10-CM

## 2025-01-28 DIAGNOSIS — Z98.891 S/P CESAREAN SECTION: ICD-10-CM

## 2025-01-28 LAB
ABO GROUP BLD: NORMAL
BASOPHILS # BLD AUTO: 0.02 10*3/MM3 (ref 0–0.2)
BASOPHILS NFR BLD AUTO: 0.2 % (ref 0–1.5)
BLD GP AB SCN SERPL QL: NEGATIVE
DEPRECATED RDW RBC AUTO: 44.7 FL (ref 37–54)
EOSINOPHIL # BLD AUTO: 0.06 10*3/MM3 (ref 0–0.4)
EOSINOPHIL NFR BLD AUTO: 0.6 % (ref 0.3–6.2)
ERYTHROCYTE [DISTWIDTH] IN BLOOD BY AUTOMATED COUNT: 13 % (ref 12.3–15.4)
HCT VFR BLD AUTO: 36.8 % (ref 34–46.6)
HGB BLD-MCNC: 12.3 G/DL (ref 12–15.9)
IMM GRANULOCYTES # BLD AUTO: 0.04 10*3/MM3 (ref 0–0.05)
IMM GRANULOCYTES NFR BLD AUTO: 0.4 % (ref 0–0.5)
LYMPHOCYTES # BLD AUTO: 1.58 10*3/MM3 (ref 0.7–3.1)
LYMPHOCYTES NFR BLD AUTO: 15.2 % (ref 19.6–45.3)
MCH RBC QN AUTO: 31.1 PG (ref 26.6–33)
MCHC RBC AUTO-ENTMCNC: 33.4 G/DL (ref 31.5–35.7)
MCV RBC AUTO: 93.2 FL (ref 79–97)
MONOCYTES # BLD AUTO: 0.66 10*3/MM3 (ref 0.1–0.9)
MONOCYTES NFR BLD AUTO: 6.3 % (ref 5–12)
NEUTROPHILS NFR BLD AUTO: 77.3 % (ref 42.7–76)
NEUTROPHILS NFR BLD AUTO: 8.06 10*3/MM3 (ref 1.7–7)
NRBC BLD AUTO-RTO: 0 /100 WBC (ref 0–0.2)
PLATELET # BLD AUTO: 204 10*3/MM3 (ref 140–450)
PMV BLD AUTO: 11.2 FL (ref 6–12)
RBC # BLD AUTO: 3.95 10*6/MM3 (ref 3.77–5.28)
RH BLD: POSITIVE
T&S EXPIRATION DATE: NORMAL
TREPONEMA PALLIDUM IGG+IGM AB [PRESENCE] IN SERUM OR PLASMA BY IMMUNOASSAY: NORMAL
WBC NRBC COR # BLD AUTO: 10.42 10*3/MM3 (ref 3.4–10.8)

## 2025-01-28 PROCEDURE — 86900 BLOOD TYPING SEROLOGIC ABO: CPT | Performed by: MIDWIFE

## 2025-01-28 PROCEDURE — 85025 COMPLETE CBC W/AUTO DIFF WBC: CPT | Performed by: MIDWIFE

## 2025-01-28 PROCEDURE — 99222 1ST HOSP IP/OBS MODERATE 55: CPT | Performed by: MIDWIFE

## 2025-01-28 PROCEDURE — 86901 BLOOD TYPING SEROLOGIC RH(D): CPT | Performed by: MIDWIFE

## 2025-01-28 PROCEDURE — G0463 HOSPITAL OUTPT CLINIC VISIT: HCPCS

## 2025-01-28 PROCEDURE — 86780 TREPONEMA PALLIDUM: CPT | Performed by: MIDWIFE

## 2025-01-28 PROCEDURE — 59025 FETAL NON-STRESS TEST: CPT

## 2025-01-28 PROCEDURE — 51702 INSERT TEMP BLADDER CATH: CPT

## 2025-01-28 PROCEDURE — 86850 RBC ANTIBODY SCREEN: CPT | Performed by: MIDWIFE

## 2025-01-28 PROCEDURE — 3E033VJ INTRODUCTION OF OTHER HORMONE INTO PERIPHERAL VEIN, PERCUTANEOUS APPROACH: ICD-10-PCS | Performed by: MIDWIFE

## 2025-01-28 PROCEDURE — 25810000003 LACTATED RINGERS PER 1000 ML: Performed by: MIDWIFE

## 2025-01-28 RX ORDER — OXYTOCIN/0.9 % SODIUM CHLORIDE 30/500 ML
1-20 PLASTIC BAG, INJECTION (ML) INTRAVENOUS
Status: DISCONTINUED | OUTPATIENT
Start: 2025-01-28 | End: 2025-01-29 | Stop reason: HOSPADM

## 2025-01-28 RX ORDER — SODIUM CHLORIDE 9 MG/ML
40 INJECTION, SOLUTION INTRAVENOUS AS NEEDED
Status: DISCONTINUED | OUTPATIENT
Start: 2025-01-28 | End: 2025-01-29 | Stop reason: HOSPADM

## 2025-01-28 RX ORDER — SODIUM CHLORIDE 0.9 % (FLUSH) 0.9 %
10 SYRINGE (ML) INJECTION EVERY 12 HOURS SCHEDULED
Status: DISCONTINUED | OUTPATIENT
Start: 2025-01-28 | End: 2025-01-29 | Stop reason: HOSPADM

## 2025-01-28 RX ORDER — ONDANSETRON 4 MG/1
4 TABLET, ORALLY DISINTEGRATING ORAL EVERY 6 HOURS PRN
Status: DISCONTINUED | OUTPATIENT
Start: 2025-01-28 | End: 2025-01-29 | Stop reason: HOSPADM

## 2025-01-28 RX ORDER — PROMETHAZINE HYDROCHLORIDE 12.5 MG/1
12.5 TABLET ORAL EVERY 6 HOURS PRN
Status: DISCONTINUED | OUTPATIENT
Start: 2025-01-28 | End: 2025-01-29 | Stop reason: HOSPADM

## 2025-01-28 RX ORDER — HYDROXYZINE HYDROCHLORIDE 25 MG/1
50 TABLET, FILM COATED ORAL NIGHTLY PRN
Status: DISCONTINUED | OUTPATIENT
Start: 2025-01-28 | End: 2025-01-29 | Stop reason: HOSPADM

## 2025-01-28 RX ORDER — ONDANSETRON 2 MG/ML
4 INJECTION INTRAMUSCULAR; INTRAVENOUS EVERY 6 HOURS PRN
Status: DISCONTINUED | OUTPATIENT
Start: 2025-01-28 | End: 2025-01-29 | Stop reason: HOSPADM

## 2025-01-28 RX ORDER — ACETAMINOPHEN 325 MG/1
650 TABLET ORAL EVERY 4 HOURS PRN
Status: DISCONTINUED | OUTPATIENT
Start: 2025-01-28 | End: 2025-01-29 | Stop reason: HOSPADM

## 2025-01-28 RX ORDER — LIDOCAINE HYDROCHLORIDE 10 MG/ML
0.5 INJECTION, SOLUTION INFILTRATION; PERINEURAL ONCE AS NEEDED
Status: DISCONTINUED | OUTPATIENT
Start: 2025-01-28 | End: 2025-01-29 | Stop reason: HOSPADM

## 2025-01-28 RX ORDER — SODIUM CHLORIDE, SODIUM LACTATE, POTASSIUM CHLORIDE, CALCIUM CHLORIDE 600; 310; 30; 20 MG/100ML; MG/100ML; MG/100ML; MG/100ML
30 INJECTION, SOLUTION INTRAVENOUS CONTINUOUS
Status: DISCONTINUED | OUTPATIENT
Start: 2025-01-28 | End: 2025-01-29

## 2025-01-28 RX ORDER — PROMETHAZINE HYDROCHLORIDE 12.5 MG/1
12.5 SUPPOSITORY RECTAL EVERY 6 HOURS PRN
Status: DISCONTINUED | OUTPATIENT
Start: 2025-01-28 | End: 2025-01-29 | Stop reason: HOSPADM

## 2025-01-28 RX ORDER — SODIUM CHLORIDE 0.9 % (FLUSH) 0.9 %
10 SYRINGE (ML) INJECTION AS NEEDED
Status: DISCONTINUED | OUTPATIENT
Start: 2025-01-28 | End: 2025-01-29 | Stop reason: HOSPADM

## 2025-01-28 RX ADMIN — SODIUM CHLORIDE, POTASSIUM CHLORIDE, SODIUM LACTATE AND CALCIUM CHLORIDE 30 ML/HR: 600; 310; 30; 20 INJECTION, SOLUTION INTRAVENOUS at 16:17

## 2025-01-28 RX ADMIN — HYDROXYZINE HYDROCHLORIDE 50 MG: 25 TABLET, FILM COATED ORAL at 22:21

## 2025-01-28 RX ADMIN — Medication 1 MILLI-UNITS/MIN: at 19:59

## 2025-01-28 NOTE — H&P
ARLEEN Hart  Conchita Perez  : 1995  MRN: [id number]  CSN: [id number]    History and Physical    Chief Complaint   Patient presents with    Scheduled Induction      Subjective   Conchita Perez is a 29 y.o. year old  with an Estimated Date of Delivery: 25 currently 41w0d presenting for induction of labor for elective at term per patient request and post dates.     Risks of labor induction including prolongation of labor, increased risks for both  section and operative vaginal birth have been discussed at length.     Prenatal care has been with E STEFANO Hart.  It has been complicated by anemia and Elevated BP x 1 . First PNV on 7/3/24 @ 11 weeks with 14 visits. Weight gain = 35 lbs.     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    Anxiety     Migraines     Visual impairment     Wears glasses      Past Surgical History:   Procedure Laterality Date    D & C WITH SUCTION N/A 2024    Procedure: DILATATION AND CURETTAGE WITH SUCTION;  Surgeon: Jia Damico MD;  Location: [facility];  Service: Obstetrics/Gynecology;  Laterality: N/A;    EYE SURGERY      several wh she was a child     WISDOM TOOTH EXTRACTION         Current Facility-Administered Medications:     acetaminophen (TYLENOL) tablet 650 mg, 650 mg, Oral, Q4H PRN, Francisco, Rand A, CNM    hydrOXYzine (ATARAX) tablet 50 mg, 50 mg, Oral, Nightly PRN, Foster, Rand A, CNM    lactated ringers bolus 1,000 mL, 1,000 mL, Intravenous, Once, Francisco Rand A, CNM    lactated ringers infusion, 30 mL/hr, Intravenous, Continuous, Hedy Singhorah A, CNM, Last Rate: 30 mL/hr at 25 1617, 30 mL/hr at  01/28/25 1617    lidocaine (XYLOCAINE) 1 % injection 0.5 mL, 0.5 mL, Intradermal, Once PRN, Foster, Rand A, CNM    morphine injection 4 mg, 4 mg, Intravenous, Q4H PRN, Foster, Rand A, CNM    morphine injection 6 mg, 6 mg, Intravenous, Q4H PRN, Foster, Rand A, CNM    ondansetron ODT (ZOFRAN-ODT) disintegrating tablet 4 mg, 4 mg, Oral, Q6H PRN **OR** ondansetron (ZOFRAN) injection 4 mg, 4 mg, Intravenous, Q6H PRN, Foster, Rand A, CNM    oxytocin (PITOCIN) 30 units in 0.9% sodium chloride 500 mL (premix), 1-20 art-units/min, Intravenous, Titrated, Foster, Rand A, CNM    promethazine (PHENERGAN) suppository 12.5 mg, 12.5 mg, Rectal, Q6H PRN **OR** promethazine (PHENERGAN) tablet 12.5 mg, 12.5 mg, Oral, Q6H PRN, Foster, Rand A, CNM    sodium chloride 0.9 % flush 10 mL, 10 mL, Intravenous, Q12H, Foster, Rand A, CNM    sodium chloride 0.9 % flush 10 mL, 10 mL, Intravenous, PRN, Foster, Rand A, CNM    sodium chloride 0.9 % infusion 40 mL, 40 mL, Intravenous, PRN, Foster, Rand A, CNM    No Known Allergies  Social History    Tobacco Use      Smoking status: Never        Passive exposure: Never      Smokeless tobacco: Never    Review of Systems   Constitutional: Negative.    Respiratory: Negative.     Cardiovascular: Negative.    Gastrointestinal: Negative.    Genitourinary: Negative.    Musculoskeletal: Negative.    Neurological: Negative.    Psychiatric/Behavioral: Negative.     All other systems reviewed and are negative.        Objective   Wt 115 kg (253 lb 12.8 oz)   LMP 04/16/2024   Breastfeeding Yes   BMI 36.42 kg/m²                                           General:  well developed; well nourished  no acute distress  mentation appropriate   Neck:    Heart:   supple and no masses  normal apical impulse  regular rate and rhythm   Lungs: breathing is unlabored   Abdomen: Gravid and non-tender  EFW: 8#, growth scan @ 38 wks=86%, AC 93%, anterior placenta   FHT's: reassuring, reactive, and  category 1   Cervix: was checked (by me): 1-2 cm / 60 % / -3 posterior   Presentation: cephalic   Contractions: none - external monitors used   Extremities:   normal appearance with no cyanosis or edema and no calf tenderness   Skin:  Skin color, texture, turgor normal. No rashes or lesions or Skin warm and dry   Psych:  Normal. and Alert and oriented, appropriate affect.       Prenatal Labs  Lab Results   Component Value Date    HGB 12.3 01/28/2025    HEPBSAG Negative 07/03/2024    ABSCRN Negative 07/03/2024    LUU4YPH4 Non Reactive 07/03/2024    HEPCVIRUSABY Non Reactive 07/03/2024    STREPGPB Negative 12/27/2024       Current Labs Reviewed   Lab Results (last 24 hours)       Procedure Component Value Units Date/Time    CBC & Differential [[id number]]  (Abnormal) Collected: 01/28/25 1619    Specimen: Blood Updated: 01/28/25 1636    Narrative:      The following orders were created for panel order CBC & Differential.  Procedure                               Abnormality         Status                     ---------                               -----------         ------                     CBC Auto Differential[[id number]]        Abnormal            Final result                 Please view results for these tests on the individual orders.    CBC Auto Differential [[id number]]  (Abnormal) Collected: 01/28/25 1619    Specimen: Blood Updated: 01/28/25 1636     WBC 10.42 10*3/mm3      RBC 3.95 10*6/mm3      Hemoglobin 12.3 g/dL      Hematocrit 36.8 %      MCV 93.2 fL      MCH 31.1 pg      MCHC 33.4 g/dL      RDW 13.0 %      RDW-SD 44.7 fl      MPV 11.2 fL      Platelets 204 10*3/mm3      Neutrophil % 77.3 %      Lymphocyte % 15.2 %      Monocyte % 6.3 %      Eosinophil % 0.6 %      Basophil % 0.2 %      Immature Grans % 0.4 %      Neutrophils, Absolute 8.06 10*3/mm3      Lymphocytes, Absolute 1.58 10*3/mm3      Monocytes, Absolute 0.66 10*3/mm3      Eosinophils, Absolute 0.06 10*3/mm3      Basophils, Absolute 0.02 10*3/mm3       Immature Grans, Absolute 0.04 10*3/mm3      nRBC 0.0 /100 WBC     Treponema pallidum AB w/Reflex RPR [[id number]] Collected: 25    Specimen: Blood Updated: 25               ASSESSMENT  IUP at 41w0d  Induction of labor because of elective at term per patient request and post dates  Group B strep status: negative  Reactive NST    PLAN  Admit to   Combs catheter placed after cervix cleansed with Betadine. 60/60 sterile water. Tolerated well by mom and baby.  Low dose Pitocin induction and increase per protocol @ [facility]  All procedures explained to patient and . Questions answered and verbalized understanding.  Anticipate     Rand Singh, APRN  2025  16:48 EST

## 2025-01-29 ENCOUNTER — ANESTHESIA EVENT (OUTPATIENT)
Dept: PERIOP | Facility: HOSPITAL | Age: 30
End: 2025-01-29
Payer: COMMERCIAL

## 2025-01-29 ENCOUNTER — ANESTHESIA (OUTPATIENT)
Dept: PERIOP | Facility: HOSPITAL | Age: 30
End: 2025-01-29
Payer: COMMERCIAL

## 2025-01-29 PROBLEM — O41.1230 CHORIOAMNIONITIS IN THIRD TRIMESTER: Status: ACTIVE | Noted: 2025-01-29

## 2025-01-29 PROBLEM — Z3A.41 POST TERM PREGNANCY AT 41 WEEKS GESTATION: Status: ACTIVE | Noted: 2025-01-28

## 2025-01-29 PROBLEM — O36.8390 NON-REASSURING FETAL HEART TONES COMPLICATING PREGNANCY, ANTEPARTUM: Status: ACTIVE | Noted: 2025-01-24

## 2025-01-29 PROBLEM — O48.0 POST TERM PREGNANCY AT 41 WEEKS GESTATION: Status: ACTIVE | Noted: 2025-01-28

## 2025-01-29 PROCEDURE — 25010000002 CLINDAMYCIN 900 MG/50ML SOLUTION: Performed by: STUDENT IN AN ORGANIZED HEALTH CARE EDUCATION/TRAINING PROGRAM

## 2025-01-29 PROCEDURE — 59510 CESAREAN DELIVERY: CPT | Performed by: STUDENT IN AN ORGANIZED HEALTH CARE EDUCATION/TRAINING PROGRAM

## 2025-01-29 PROCEDURE — 25010000002 FENTANYL CITRATE (PF) 100 MCG/2ML SOLUTION: Performed by: NURSE ANESTHETIST, CERTIFIED REGISTERED

## 2025-01-29 PROCEDURE — 25010000002 GENTAMICIN PER 80 MG: Performed by: STUDENT IN AN ORGANIZED HEALTH CARE EDUCATION/TRAINING PROGRAM

## 2025-01-29 PROCEDURE — 25010000002 KETOROLAC TROMETHAMINE PER 15 MG: Performed by: STUDENT IN AN ORGANIZED HEALTH CARE EDUCATION/TRAINING PROGRAM

## 2025-01-29 PROCEDURE — 25010000002 MEPERIDINE PER 100 MG: Performed by: NURSE ANESTHETIST, CERTIFIED REGISTERED

## 2025-01-29 PROCEDURE — 25810000003 LACTATED RINGERS PER 1000 ML: Performed by: MIDWIFE

## 2025-01-29 PROCEDURE — 25010000002 METOCLOPRAMIDE PER 10 MG: Performed by: NURSE ANESTHETIST, CERTIFIED REGISTERED

## 2025-01-29 PROCEDURE — C1755 CATHETER, INTRASPINAL: HCPCS | Performed by: NURSE ANESTHETIST, CERTIFIED REGISTERED

## 2025-01-29 PROCEDURE — 25010000002 DEXAMETHASONE PER 1 MG: Performed by: NURSE ANESTHETIST, CERTIFIED REGISTERED

## 2025-01-29 PROCEDURE — 25010000002 ONDANSETRON PER 1 MG: Performed by: NURSE ANESTHETIST, CERTIFIED REGISTERED

## 2025-01-29 PROCEDURE — 25010000002 MORPHINE PER 10 MG: Performed by: NURSE ANESTHETIST, CERTIFIED REGISTERED

## 2025-01-29 PROCEDURE — 25810000003 LACTATED RINGERS SOLUTION: Performed by: STUDENT IN AN ORGANIZED HEALTH CARE EDUCATION/TRAINING PROGRAM

## 2025-01-29 PROCEDURE — 25010000002 ONDANSETRON PER 1 MG: Performed by: MIDWIFE

## 2025-01-29 PROCEDURE — 51703 INSERT BLADDER CATH COMPLEX: CPT

## 2025-01-29 PROCEDURE — 25010000002 OXYTOCIN PER 10 UNITS: Performed by: NURSE ANESTHETIST, CERTIFIED REGISTERED

## 2025-01-29 PROCEDURE — 25010000002 LIDOCAINE-EPINEPHRINE (PF) 2 %-1:200000 SOLUTION: Performed by: NURSE ANESTHETIST, CERTIFIED REGISTERED

## 2025-01-29 PROCEDURE — 25010000002 AMPICILLIN PER 500 MG: Performed by: STUDENT IN AN ORGANIZED HEALTH CARE EDUCATION/TRAINING PROGRAM

## 2025-01-29 DEVICE — ABSORBABLE HEMOSTAT (OXIDIZED REGENERATED CELLULOSE)
Type: IMPLANTABLE DEVICE | Site: ABDOMEN | Status: FUNCTIONAL
Brand: SURGICEL

## 2025-01-29 RX ORDER — ONDANSETRON 2 MG/ML
4 INJECTION INTRAMUSCULAR; INTRAVENOUS ONCE AS NEEDED
Status: DISCONTINUED | OUTPATIENT
Start: 2025-01-29 | End: 2025-01-31 | Stop reason: HOSPADM

## 2025-01-29 RX ORDER — ONDANSETRON 2 MG/ML
4 INJECTION INTRAMUSCULAR; INTRAVENOUS EVERY 6 HOURS PRN
Status: DISCONTINUED | OUTPATIENT
Start: 2025-01-29 | End: 2025-01-29 | Stop reason: HOSPADM

## 2025-01-29 RX ORDER — OXYTOCIN/0.9 % SODIUM CHLORIDE 30/500 ML
250 PLASTIC BAG, INJECTION (ML) INTRAVENOUS CONTINUOUS
Status: ACTIVE | OUTPATIENT
Start: 2025-01-29 | End: 2025-01-29

## 2025-01-29 RX ORDER — FAMOTIDINE 10 MG/ML
20 INJECTION, SOLUTION INTRAVENOUS ONCE
Status: COMPLETED | OUTPATIENT
Start: 2025-01-29 | End: 2025-01-29

## 2025-01-29 RX ORDER — ACETAMINOPHEN 500 MG
1000 TABLET ORAL EVERY 6 HOURS
Status: DISCONTINUED | OUTPATIENT
Start: 2025-01-31 | End: 2025-01-31 | Stop reason: HOSPADM

## 2025-01-29 RX ORDER — OXYTOCIN/0.9 % SODIUM CHLORIDE 30/500 ML
125 PLASTIC BAG, INJECTION (ML) INTRAVENOUS ONCE AS NEEDED
Status: DISCONTINUED | OUTPATIENT
Start: 2025-01-29 | End: 2025-01-31 | Stop reason: HOSPADM

## 2025-01-29 RX ORDER — ENOXAPARIN SODIUM 100 MG/ML
40 INJECTION SUBCUTANEOUS NIGHTLY
Status: DISCONTINUED | OUTPATIENT
Start: 2025-01-30 | End: 2025-01-31 | Stop reason: HOSPADM

## 2025-01-29 RX ORDER — DOCUSATE SODIUM 100 MG/1
100 CAPSULE, LIQUID FILLED ORAL 2 TIMES DAILY
Status: DISCONTINUED | OUTPATIENT
Start: 2025-01-29 | End: 2025-01-31 | Stop reason: HOSPADM

## 2025-01-29 RX ORDER — ONDANSETRON 4 MG/1
4 TABLET, ORALLY DISINTEGRATING ORAL EVERY 6 HOURS PRN
Status: DISCONTINUED | OUTPATIENT
Start: 2025-01-29 | End: 2025-01-29 | Stop reason: HOSPADM

## 2025-01-29 RX ORDER — OXYCODONE HYDROCHLORIDE 5 MG/1
5 TABLET ORAL EVERY 4 HOURS PRN
Status: DISCONTINUED | OUTPATIENT
Start: 2025-01-29 | End: 2025-01-31 | Stop reason: HOSPADM

## 2025-01-29 RX ORDER — NALOXONE HCL 0.4 MG/ML
0.4 VIAL (ML) INJECTION
Status: ACTIVE | OUTPATIENT
Start: 2025-01-29 | End: 2025-01-30

## 2025-01-29 RX ORDER — METOCLOPRAMIDE HYDROCHLORIDE 5 MG/ML
INJECTION INTRAMUSCULAR; INTRAVENOUS AS NEEDED
Status: DISCONTINUED | OUTPATIENT
Start: 2025-01-29 | End: 2025-01-29 | Stop reason: SURG

## 2025-01-29 RX ORDER — FAMOTIDINE 20 MG/1
20 TABLET, FILM COATED ORAL ONCE AS NEEDED
Status: DISCONTINUED | OUTPATIENT
Start: 2025-01-29 | End: 2025-01-29 | Stop reason: HOSPADM

## 2025-01-29 RX ORDER — CITRIC ACID/SODIUM CITRATE 334-500MG
30 SOLUTION, ORAL ORAL ONCE
Status: COMPLETED | OUTPATIENT
Start: 2025-01-29 | End: 2025-01-29

## 2025-01-29 RX ORDER — MEPERIDINE HYDROCHLORIDE 25 MG/ML
25 INJECTION INTRAMUSCULAR; INTRAVENOUS; SUBCUTANEOUS ONCE AS NEEDED
Status: ACTIVE | OUTPATIENT
Start: 2025-01-29 | End: 2025-01-29

## 2025-01-29 RX ORDER — CLINDAMYCIN PHOSPHATE 900 MG/50ML
900 INJECTION, SOLUTION INTRAVENOUS ONCE
Status: COMPLETED | OUTPATIENT
Start: 2025-01-29 | End: 2025-01-29

## 2025-01-29 RX ORDER — DIPHENHYDRAMINE HYDROCHLORIDE 50 MG/ML
25 INJECTION INTRAMUSCULAR; INTRAVENOUS NIGHTLY PRN
Status: DISCONTINUED | OUTPATIENT
Start: 2025-01-29 | End: 2025-01-29 | Stop reason: HOSPADM

## 2025-01-29 RX ORDER — OXYTOCIN/0.9 % SODIUM CHLORIDE 30/500 ML
333 PLASTIC BAG, INJECTION (ML) INTRAVENOUS ONCE
Status: DISCONTINUED | OUTPATIENT
Start: 2025-01-29 | End: 2025-01-29 | Stop reason: HOSPADM

## 2025-01-29 RX ORDER — EPHEDRINE SULFATE 5 MG/ML
5 INJECTION INTRAVENOUS
Status: DISCONTINUED | OUTPATIENT
Start: 2025-01-29 | End: 2025-01-29 | Stop reason: HOSPADM

## 2025-01-29 RX ORDER — SODIUM CHLORIDE 9 MG/ML
40 INJECTION, SOLUTION INTRAVENOUS AS NEEDED
Status: DISCONTINUED | OUTPATIENT
Start: 2025-01-29 | End: 2025-01-29 | Stop reason: HOSPADM

## 2025-01-29 RX ORDER — LIDOCAINE HYDROCHLORIDE 10 MG/ML
0.5 INJECTION, SOLUTION INFILTRATION; PERINEURAL ONCE AS NEEDED
Status: DISCONTINUED | OUTPATIENT
Start: 2025-01-29 | End: 2025-01-29 | Stop reason: HOSPADM

## 2025-01-29 RX ORDER — MAGNESIUM CARB/ALUMINUM HYDROX 105-160MG
30 TABLET,CHEWABLE ORAL ONCE
Status: COMPLETED | OUTPATIENT
Start: 2025-01-29 | End: 2025-01-29

## 2025-01-29 RX ORDER — ONDANSETRON 2 MG/ML
4 INJECTION INTRAMUSCULAR; INTRAVENOUS EVERY 6 HOURS PRN
Status: DISCONTINUED | OUTPATIENT
Start: 2025-01-29 | End: 2025-01-31 | Stop reason: HOSPADM

## 2025-01-29 RX ORDER — ACETAMINOPHEN 325 MG/1
650 TABLET ORAL EVERY 6 HOURS
Status: DISCONTINUED | OUTPATIENT
Start: 2025-01-29 | End: 2025-01-29 | Stop reason: HOSPADM

## 2025-01-29 RX ORDER — ALUMINA, MAGNESIA, AND SIMETHICONE 2400; 2400; 240 MG/30ML; MG/30ML; MG/30ML
15 SUSPENSION ORAL EVERY 4 HOURS PRN
Status: DISCONTINUED | OUTPATIENT
Start: 2025-01-29 | End: 2025-01-31 | Stop reason: HOSPADM

## 2025-01-29 RX ORDER — MORPHINE SULFATE 2 MG/ML
2 INJECTION, SOLUTION INTRAMUSCULAR; INTRAVENOUS
Status: DISCONTINUED | OUTPATIENT
Start: 2025-01-29 | End: 2025-01-29 | Stop reason: HOSPADM

## 2025-01-29 RX ORDER — MISOPROSTOL 200 UG/1
800 TABLET ORAL AS NEEDED
Status: DISCONTINUED | OUTPATIENT
Start: 2025-01-29 | End: 2025-01-29 | Stop reason: HOSPADM

## 2025-01-29 RX ORDER — CALCIUM CARBONATE 500 MG/1
1 TABLET, CHEWABLE ORAL EVERY 4 HOURS PRN
Status: DISCONTINUED | OUTPATIENT
Start: 2025-01-29 | End: 2025-01-31 | Stop reason: HOSPADM

## 2025-01-29 RX ORDER — KETOROLAC TROMETHAMINE 30 MG/ML
30 INJECTION, SOLUTION INTRAMUSCULAR; INTRAVENOUS ONCE
Status: COMPLETED | OUTPATIENT
Start: 2025-01-29 | End: 2025-01-29

## 2025-01-29 RX ORDER — IBUPROFEN 600 MG/1
600 TABLET, FILM COATED ORAL EVERY 6 HOURS SCHEDULED
Status: DISCONTINUED | OUTPATIENT
Start: 2025-01-29 | End: 2025-01-29 | Stop reason: HOSPADM

## 2025-01-29 RX ORDER — OXYTOCIN/0.9 % SODIUM CHLORIDE 30/500 ML
999 PLASTIC BAG, INJECTION (ML) INTRAVENOUS ONCE
Status: DISCONTINUED | OUTPATIENT
Start: 2025-01-29 | End: 2025-01-29 | Stop reason: HOSPADM

## 2025-01-29 RX ORDER — HYDROCODONE BITARTRATE AND ACETAMINOPHEN 5; 325 MG/1; MG/1
1 TABLET ORAL EVERY 4 HOURS PRN
Status: DISCONTINUED | OUTPATIENT
Start: 2025-01-29 | End: 2025-01-29 | Stop reason: HOSPADM

## 2025-01-29 RX ORDER — ONDANSETRON 2 MG/ML
4 INJECTION INTRAMUSCULAR; INTRAVENOUS ONCE AS NEEDED
Status: DISCONTINUED | OUTPATIENT
Start: 2025-01-29 | End: 2025-01-29 | Stop reason: HOSPADM

## 2025-01-29 RX ORDER — MORPHINE SULFATE 2 MG/ML
2 INJECTION, SOLUTION INTRAMUSCULAR; INTRAVENOUS ONCE AS NEEDED
Status: DISCONTINUED | OUTPATIENT
Start: 2025-01-29 | End: 2025-01-29 | Stop reason: HOSPADM

## 2025-01-29 RX ORDER — TRANEXAMIC ACID 10 MG/ML
1000 INJECTION, SOLUTION INTRAVENOUS ONCE AS NEEDED
Status: DISCONTINUED | OUTPATIENT
Start: 2025-01-29 | End: 2025-01-29 | Stop reason: HOSPADM

## 2025-01-29 RX ORDER — DEXAMETHASONE SODIUM PHOSPHATE 4 MG/ML
INJECTION, SOLUTION INTRA-ARTICULAR; INTRALESIONAL; INTRAMUSCULAR; INTRAVENOUS; SOFT TISSUE AS NEEDED
Status: DISCONTINUED | OUTPATIENT
Start: 2025-01-29 | End: 2025-01-29 | Stop reason: SURG

## 2025-01-29 RX ORDER — CARBOPROST TROMETHAMINE 250 UG/ML
250 INJECTION, SOLUTION INTRAMUSCULAR AS NEEDED
Status: DISCONTINUED | OUTPATIENT
Start: 2025-01-29 | End: 2025-01-29 | Stop reason: HOSPADM

## 2025-01-29 RX ORDER — IBUPROFEN 600 MG/1
600 TABLET, FILM COATED ORAL EVERY 6 HOURS SCHEDULED
Status: DISCONTINUED | OUTPATIENT
Start: 2025-01-30 | End: 2025-01-31 | Stop reason: HOSPADM

## 2025-01-29 RX ORDER — OXYTOCIN/0.9 % SODIUM CHLORIDE 30/500 ML
42 PLASTIC BAG, INJECTION (ML) INTRAVENOUS AS NEEDED
Status: DISCONTINUED | OUTPATIENT
Start: 2025-01-29 | End: 2025-01-29 | Stop reason: HOSPADM

## 2025-01-29 RX ORDER — SODIUM CHLORIDE, SODIUM LACTATE, POTASSIUM CHLORIDE, CALCIUM CHLORIDE 600; 310; 30; 20 MG/100ML; MG/100ML; MG/100ML; MG/100ML
125 INJECTION, SOLUTION INTRAVENOUS CONTINUOUS
Status: DISCONTINUED | OUTPATIENT
Start: 2025-01-29 | End: 2025-01-29

## 2025-01-29 RX ORDER — FERROUS SULFATE 324(65)MG
324 TABLET, DELAYED RELEASE (ENTERIC COATED) ORAL 2 TIMES DAILY WITH MEALS
Status: DISCONTINUED | OUTPATIENT
Start: 2025-01-29 | End: 2025-01-31 | Stop reason: HOSPADM

## 2025-01-29 RX ORDER — PROMETHAZINE HYDROCHLORIDE 12.5 MG/1
12.5 SUPPOSITORY RECTAL EVERY 6 HOURS PRN
Status: DISCONTINUED | OUTPATIENT
Start: 2025-01-29 | End: 2025-01-31 | Stop reason: HOSPADM

## 2025-01-29 RX ORDER — CITRIC ACID/SODIUM CITRATE 334-500MG
30 SOLUTION, ORAL ORAL ONCE
Status: DISCONTINUED | OUTPATIENT
Start: 2025-01-29 | End: 2025-01-29 | Stop reason: HOSPADM

## 2025-01-29 RX ORDER — ONDANSETRON 4 MG/1
4 TABLET, ORALLY DISINTEGRATING ORAL EVERY 6 HOURS PRN
Status: DISCONTINUED | OUTPATIENT
Start: 2025-01-29 | End: 2025-01-31 | Stop reason: HOSPADM

## 2025-01-29 RX ORDER — ACETAMINOPHEN 500 MG
1000 TABLET ORAL EVERY 6 HOURS
Status: COMPLETED | OUTPATIENT
Start: 2025-01-29 | End: 2025-01-30

## 2025-01-29 RX ORDER — ONDANSETRON 2 MG/ML
INJECTION INTRAMUSCULAR; INTRAVENOUS AS NEEDED
Status: DISCONTINUED | OUTPATIENT
Start: 2025-01-29 | End: 2025-01-29 | Stop reason: SURG

## 2025-01-29 RX ORDER — NALBUPHINE HYDROCHLORIDE 10 MG/ML
2.5 INJECTION INTRAMUSCULAR; INTRAVENOUS; SUBCUTANEOUS EVERY 4 HOURS PRN
Status: ACTIVE | OUTPATIENT
Start: 2025-01-29 | End: 2025-01-30

## 2025-01-29 RX ORDER — LORAZEPAM 2 MG/ML
1 INJECTION INTRAMUSCULAR ONCE AS NEEDED
Status: DISCONTINUED | OUTPATIENT
Start: 2025-01-29 | End: 2025-01-31 | Stop reason: HOSPADM

## 2025-01-29 RX ORDER — CLINDAMYCIN PHOSPHATE 900 MG/50ML
900 INJECTION, SOLUTION INTRAVENOUS ONCE
Status: COMPLETED | OUTPATIENT
Start: 2025-01-30 | End: 2025-01-30

## 2025-01-29 RX ORDER — PROMETHAZINE HYDROCHLORIDE 25 MG/1
25 TABLET ORAL EVERY 6 HOURS PRN
Status: DISCONTINUED | OUTPATIENT
Start: 2025-01-29 | End: 2025-01-31 | Stop reason: HOSPADM

## 2025-01-29 RX ORDER — PRENATAL VIT/IRON FUM/FOLIC AC 27MG-0.8MG
1 TABLET ORAL DAILY
Status: DISCONTINUED | OUTPATIENT
Start: 2025-01-30 | End: 2025-01-31 | Stop reason: HOSPADM

## 2025-01-29 RX ORDER — OXYTOCIN 10 [USP'U]/ML
INJECTION, SOLUTION INTRAMUSCULAR; INTRAVENOUS AS NEEDED
Status: DISCONTINUED | OUTPATIENT
Start: 2025-01-29 | End: 2025-01-29 | Stop reason: SURG

## 2025-01-29 RX ORDER — PROMETHAZINE HYDROCHLORIDE 12.5 MG/1
12.5 TABLET ORAL EVERY 6 HOURS PRN
Status: DISCONTINUED | OUTPATIENT
Start: 2025-01-29 | End: 2025-01-29 | Stop reason: HOSPADM

## 2025-01-29 RX ORDER — DIPHENHYDRAMINE HCL 25 MG
25 CAPSULE ORAL NIGHTLY PRN
Status: DISCONTINUED | OUTPATIENT
Start: 2025-01-29 | End: 2025-01-29 | Stop reason: HOSPADM

## 2025-01-29 RX ORDER — METHYLERGONOVINE MALEATE 0.2 MG/ML
200 INJECTION INTRAVENOUS ONCE AS NEEDED
Status: DISCONTINUED | OUTPATIENT
Start: 2025-01-29 | End: 2025-01-29 | Stop reason: HOSPADM

## 2025-01-29 RX ORDER — MEPERIDINE HYDROCHLORIDE 25 MG/ML
INJECTION INTRAMUSCULAR; INTRAVENOUS; SUBCUTANEOUS AS NEEDED
Status: DISCONTINUED | OUTPATIENT
Start: 2025-01-29 | End: 2025-01-29 | Stop reason: SURG

## 2025-01-29 RX ORDER — NALOXONE HYDROCHLORIDE 0.4 MG/ML
0.08 INJECTION, SOLUTION INTRAMUSCULAR; INTRAVENOUS; SUBCUTANEOUS ONCE
Status: DISCONTINUED | OUTPATIENT
Start: 2025-01-29 | End: 2025-01-31 | Stop reason: HOSPADM

## 2025-01-29 RX ORDER — MAGNESIUM HYDROXIDE 1200 MG/15ML
LIQUID ORAL AS NEEDED
Status: DISCONTINUED | OUTPATIENT
Start: 2025-01-29 | End: 2025-01-29 | Stop reason: HOSPADM

## 2025-01-29 RX ORDER — ONDANSETRON 2 MG/ML
4 INJECTION INTRAMUSCULAR; INTRAVENOUS ONCE AS NEEDED
Status: ACTIVE | OUTPATIENT
Start: 2025-01-29 | End: 2025-01-30

## 2025-01-29 RX ORDER — LIDOCAINE HCL/EPINEPHRINE/PF 2%-1:200K
VIAL (ML) INJECTION AS NEEDED
Status: DISCONTINUED | OUTPATIENT
Start: 2025-01-29 | End: 2025-01-29 | Stop reason: SURG

## 2025-01-29 RX ORDER — FENTANYL CITRATE 50 UG/ML
INJECTION, SOLUTION INTRAMUSCULAR; INTRAVENOUS AS NEEDED
Status: DISCONTINUED | OUTPATIENT
Start: 2025-01-29 | End: 2025-01-29 | Stop reason: SURG

## 2025-01-29 RX ORDER — MORPHINE SULFATE 1 MG/ML
INJECTION, SOLUTION EPIDURAL; INTRATHECAL; INTRAVENOUS AS NEEDED
Status: DISCONTINUED | OUTPATIENT
Start: 2025-01-29 | End: 2025-01-29 | Stop reason: SURG

## 2025-01-29 RX ORDER — OXYCODONE HYDROCHLORIDE 5 MG/1
10 TABLET ORAL EVERY 4 HOURS PRN
Status: DISCONTINUED | OUTPATIENT
Start: 2025-01-29 | End: 2025-01-31 | Stop reason: HOSPADM

## 2025-01-29 RX ORDER — HYDROXYZINE HYDROCHLORIDE 25 MG/1
50 TABLET, FILM COATED ORAL EVERY 6 HOURS PRN
Status: DISCONTINUED | OUTPATIENT
Start: 2025-01-29 | End: 2025-01-31 | Stop reason: HOSPADM

## 2025-01-29 RX ORDER — ONDANSETRON 4 MG/1
4 TABLET, ORALLY DISINTEGRATING ORAL ONCE AS NEEDED
Status: DISCONTINUED | OUTPATIENT
Start: 2025-01-29 | End: 2025-01-29 | Stop reason: HOSPADM

## 2025-01-29 RX ORDER — FAMOTIDINE 10 MG/ML
20 INJECTION, SOLUTION INTRAVENOUS ONCE AS NEEDED
Status: DISCONTINUED | OUTPATIENT
Start: 2025-01-29 | End: 2025-01-29 | Stop reason: HOSPADM

## 2025-01-29 RX ORDER — SODIUM CHLORIDE 0.9 % (FLUSH) 0.9 %
10 SYRINGE (ML) INJECTION AS NEEDED
Status: DISCONTINUED | OUTPATIENT
Start: 2025-01-29 | End: 2025-01-29 | Stop reason: HOSPADM

## 2025-01-29 RX ORDER — SODIUM CHLORIDE 0.9 % (FLUSH) 0.9 %
10 SYRINGE (ML) INJECTION EVERY 12 HOURS SCHEDULED
Status: DISCONTINUED | OUTPATIENT
Start: 2025-01-29 | End: 2025-01-29 | Stop reason: HOSPADM

## 2025-01-29 RX ORDER — NALBUPHINE HYDROCHLORIDE 10 MG/ML
2 INJECTION INTRAMUSCULAR; INTRAVENOUS; SUBCUTANEOUS
Status: DISCONTINUED | OUTPATIENT
Start: 2025-01-29 | End: 2025-01-31 | Stop reason: HOSPADM

## 2025-01-29 RX ORDER — BUTORPHANOL TARTRATE 2 MG/ML
0.5 INJECTION, SOLUTION INTRAMUSCULAR; INTRAVENOUS
Status: DISCONTINUED | OUTPATIENT
Start: 2025-01-29 | End: 2025-01-31 | Stop reason: HOSPADM

## 2025-01-29 RX ORDER — ACETAMINOPHEN 325 MG/1
650 TABLET ORAL ONCE AS NEEDED
Status: COMPLETED | OUTPATIENT
Start: 2025-01-29 | End: 2025-01-29

## 2025-01-29 RX ORDER — ACETAMINOPHEN 500 MG
1000 TABLET ORAL ONCE
Status: COMPLETED | OUTPATIENT
Start: 2025-01-29 | End: 2025-01-29

## 2025-01-29 RX ADMIN — KETOROLAC TROMETHAMINE 30 MG: 30 INJECTION, SOLUTION INTRAMUSCULAR; INTRAVENOUS at 19:20

## 2025-01-29 RX ADMIN — SODIUM BICARBONATE 2 ML: 84 INJECTION, SOLUTION INTRAVENOUS at 17:20

## 2025-01-29 RX ADMIN — MEPERIDINE HYDROCHLORIDE 25 MG: 25 INJECTION INTRAMUSCULAR; INTRAVENOUS; SUBCUTANEOUS at 18:32

## 2025-01-29 RX ADMIN — DEXAMETHASONE SODIUM PHOSPHATE 4 MG: 4 INJECTION, SOLUTION INTRA-ARTICULAR; INTRALESIONAL; INTRAMUSCULAR; INTRAVENOUS; SOFT TISSUE at 18:35

## 2025-01-29 RX ADMIN — LIDOCAINE HYDROCHLORIDE,EPINEPHRINE BITARTRATE 17 ML: 20; .005 INJECTION, SOLUTION EPIDURAL; INFILTRATION; INTRACAUDAL; PERINEURAL at 17:20

## 2025-01-29 RX ADMIN — SODIUM CHLORIDE, POTASSIUM CHLORIDE, SODIUM LACTATE AND CALCIUM CHLORIDE: 600; 310; 30; 20 INJECTION, SOLUTION INTRAVENOUS at 17:45

## 2025-01-29 RX ADMIN — MINERAL OIL 30 ML: 1000 SOLUTION ORAL at 14:45

## 2025-01-29 RX ADMIN — DOCUSATE SODIUM 100 MG: 100 CAPSULE, LIQUID FILLED ORAL at 23:12

## 2025-01-29 RX ADMIN — ACETAMINOPHEN 1000 MG: 500 TABLET, FILM COATED ORAL at 16:44

## 2025-01-29 RX ADMIN — OXYTOCIN 20 UNITS: 10 INJECTION, SOLUTION INTRAMUSCULAR; INTRAVENOUS at 18:12

## 2025-01-29 RX ADMIN — ACETAMINOPHEN 650 MG: 325 TABLET, FILM COATED ORAL at 21:25

## 2025-01-29 RX ADMIN — ONDANSETRON 4 MG: 2 INJECTION INTRAMUSCULAR; INTRAVENOUS at 09:37

## 2025-01-29 RX ADMIN — MORPHINE SULFATE 3 MG: 1 INJECTION EPIDURAL; INTRATHECAL; INTRAVENOUS at 18:28

## 2025-01-29 RX ADMIN — GENTAMICIN SULFATE 440 MG: 40 INJECTION, SOLUTION INTRAMUSCULAR; INTRAVENOUS at 08:30

## 2025-01-29 RX ADMIN — ONDANSETRON 4 MG: 2 INJECTION INTRAMUSCULAR; INTRAVENOUS at 17:19

## 2025-01-29 RX ADMIN — FENTANYL CITRATE 50 MCG: 50 INJECTION INTRAMUSCULAR; INTRAVENOUS at 17:20

## 2025-01-29 RX ADMIN — CLINDAMYCIN IN 5 PERCENT DEXTROSE 900 MG: 18 INJECTION, SOLUTION INTRAVENOUS at 17:43

## 2025-01-29 RX ADMIN — OXYTOCIN 20 UNITS: 10 INJECTION, SOLUTION INTRAMUSCULAR; INTRAVENOUS at 17:41

## 2025-01-29 RX ADMIN — SODIUM CHLORIDE, POTASSIUM CHLORIDE, SODIUM LACTATE AND CALCIUM CHLORIDE 30 ML/HR: 600; 310; 30; 20 INJECTION, SOLUTION INTRAVENOUS at 09:10

## 2025-01-29 RX ADMIN — Medication 10 ML/HR: at 15:14

## 2025-01-29 RX ADMIN — Medication 14 ML/HR: at 08:52

## 2025-01-29 RX ADMIN — FERROUS SULFATE TAB EC 324 MG (65 MG FE EQUIVALENT) 324 MG: 324 (65 FE) TABLET DELAYED RESPONSE at 23:12

## 2025-01-29 RX ADMIN — SODIUM CHLORIDE, POTASSIUM CHLORIDE, SODIUM LACTATE AND CALCIUM CHLORIDE: 600; 310; 30; 20 INJECTION, SOLUTION INTRAVENOUS at 18:12

## 2025-01-29 RX ADMIN — SODIUM CHLORIDE 2 G: 9 INJECTION, SOLUTION INTRAVENOUS at 23:13

## 2025-01-29 RX ADMIN — SODIUM CHLORIDE 2 G: 9 INJECTION, SOLUTION INTRAVENOUS at 16:41

## 2025-01-29 RX ADMIN — SODIUM CHLORIDE, POTASSIUM CHLORIDE, SODIUM LACTATE AND CALCIUM CHLORIDE 2000 ML: 600; 310; 30; 20 INJECTION, SOLUTION INTRAVENOUS at 16:53

## 2025-01-29 RX ADMIN — SODIUM CHLORIDE, POTASSIUM CHLORIDE, SODIUM LACTATE AND CALCIUM CHLORIDE 30 ML/HR: 600; 310; 30; 20 INJECTION, SOLUTION INTRAVENOUS at 06:48

## 2025-01-29 RX ADMIN — ONDANSETRON 4 MG: 2 INJECTION INTRAMUSCULAR; INTRAVENOUS at 14:28

## 2025-01-29 RX ADMIN — FAMOTIDINE 20 MG: 10 INJECTION, SOLUTION INTRAVENOUS at 16:41

## 2025-01-29 RX ADMIN — SODIUM CHLORIDE, POTASSIUM CHLORIDE, SODIUM LACTATE AND CALCIUM CHLORIDE 30 ML/HR: 600; 310; 30; 20 INJECTION, SOLUTION INTRAVENOUS at 15:29

## 2025-01-29 RX ADMIN — METOCLOPRAMIDE 10 MG: 5 INJECTION, SOLUTION INTRAMUSCULAR; INTRAVENOUS at 18:35

## 2025-01-29 RX ADMIN — ONDANSETRON 4 MG: 2 INJECTION INTRAMUSCULAR; INTRAVENOUS at 18:35

## 2025-01-29 RX ADMIN — SODIUM CITRATE AND CITRIC ACID MONOHYDRATE 30 ML: 500; 334 SOLUTION ORAL at 16:31

## 2025-01-29 NOTE — PROGRESS NOTES
Hartlisseth Perez  : 1995  MRN: [id number]  CSN: [id number]    Labor progress note    Subjective   She is comfortable with her epidural - not feeling CTX currently.     Objective   Min/max vitals past 24 hours:  Temp  Min: 98 °F (36.7 °C)  Max: 98.8 °F (37.1 °C)   BP  Min: 105/63  Max: 132/84   Pulse  Min: 74  Max: 96   Resp  Min: 16  Max: 18        FHT's: reassuring and category 1   Cervix: was checked (by me): 10 cm / 100 % / -1   Contractions: Q2-3 min      Assessment   Conchita Perez is a 29 y.o.  at 41w1d undergoing IOL.      Plan   Continue with induction - pitocin per protocol  Begin pushing  Anticipate     Poonam Jackman MD   2025  12:49 EST

## 2025-01-29 NOTE — ANESTHESIA PROCEDURE NOTES
Labor Epidural      Patient location during procedure: OB  Indication:at surgeon's request  Performed By  CRNA/LAITH: Eric Damon CRNA  Preanesthetic Checklist  Completed: patient identified, IV checked, site marked, risks and benefits discussed, surgical consent, monitors and equipment checked, pre-op evaluation and timeout performed  Additional Notes  1.5% lido with 1:200,000 epi. NEG  Prep:  Pt Position:sitting  Sterile Tech:cap, gloves, gown, mask and sterile barrier  Prep:chlorhexidine gluconate and isopropyl alcohol  Monitoring:blood pressure monitoring and continuous pulse oximetry  Epidural Block Procedure:  Approach:midline  Guidance:palpation technique  Location:L3-L4  Needle Type:Tuohy  Needle Gauge:18 G  Loss of Resistance: 8cm  Cath Depth at skin:12 cm  Paresthesia: none  Aspiration:negative  Test Dose:negative  Post Assessment:  Dressing:occlusive dressing applied and secured with tape  Pt Tolerance:patient tolerated the procedure well with no apparent complications  Complications:no

## 2025-01-29 NOTE — PLAN OF CARE
Goal Outcome Evaluation:           Progress: improving  Outcome Evaluation: VSS, Bryant bulb remains in cervix at this time, pitocin has been gradually increased through out the shift. FHT has remains reactive,

## 2025-01-29 NOTE — PROGRESS NOTES
Tanner  Conchita Perez  : 1995  MRN: [id number]  CSN: [id number]    Labor progress note    Subjective   She reports her epidural has taken away the pain of contractions. SROM occurred at 0530 with clear fluid.     Objective   Min/max vitals past 24 hours:  Temp  Min: 98 °F (36.7 °C)  Max: 98 °F (36.7 °C)   BP  Min: 105/63  Max: 132/84   Pulse  Min: 74  Max: 96   Resp  Min: 16  Max: 18        FHT's: reassuring and category 1   Cervix: was checked (by me): 5 cm / 80 % / -3, fore bag present and ruptured with clear fluid   Contractions: Q3 min      Assessment   Conchita Perez is a 29 y.o.  at 41w1d undergoing IOL.     Plan   Continue with induction - increase pitocin per protocol  Frequent position changes  Anticipate     Poonam Jackman MD   2025  08:39 EST

## 2025-01-29 NOTE — ANESTHESIA PREPROCEDURE EVALUATION
Anesthesia Evaluation     Patient summary reviewed and Nursing notes reviewed   NPO Solid Status: > 8 hours  NPO Liquid Status: < 2 hours           Airway   Mallampati: II  TM distance: >3 FB  Neck ROM: full  Possible difficult intubation  Dental - normal exam     Pulmonary - negative pulmonary ROS and normal exam   (-) not a smoker  Cardiovascular - negative cardio ROS and normal exam        Neuro/Psych  (+) headaches, psychiatric history Anxiety and ADHD  GI/Hepatic/Renal/Endo    (+) obesity, morbid obesity    Musculoskeletal     Abdominal   (+) obese   Substance History - negative use     OB/GYN    (+) Pregnant        Other - negative ROS       ROS/Med Hx Other:               Anesthesia Plan    ASA 2     epidural     (Risk and benefits discussed, discussed risk of nausea, vomiting, itching, low blood pressure, post-procedural back pain, PDPH, and infection. Patient reports understanding. Continue with POC)    Anesthetic plan, risks, benefits, and alternatives have been provided, discussed and informed consent has been obtained with: patient.  Pre-procedure education provided  Plan discussed with CRNA.    CODE STATUS:    Code Status (Patient has no pulse and is not breathing): CPR (Attempt to Resuscitate)  Medical Interventions (Patient has pulse or is breathing): Full Support

## 2025-01-29 NOTE — OP NOTE
Tanner Perez  : 1995  MRN: [id number]  CSN: [id number]    Operative Report    Pre-Operative Dx:   IUP at 41w1d weeks   Arrest of descent  Chorioamnionitis  Non-reassuring fetal heart rate tracing  Suspected fetal macrosomia      Postoperative dx:    Same     Procedure: Primary low transverse  section       Surgeon:    Surgical assistant: MD Susana Talbert and Jessica Camacho were responsible for performing the following activities: Retraction, Suction, and Placing Dressing and their skilled assistance was necessary for the success of this case.     OR Staff:   Circulator: Nikole Box RN  Scrub Person: Jessica Camacho; Dorcas Dill; Susana Meier  [place] & [place] Nurse: Alejandra Espinal RN  Baby Nurse: Jena Flores RN; Beverly Norton RN       Anesthesia: Epidural       QBL: 930 ml       Antibiotics: Ampicillin 2 g, Gentamicin 5 mg/kg, and Clindamycin 900 mg at cord clamp     Drains:    Specimens: Bryant    None     Findings: Normal appearing uterus, fallopian tubes and ovaries. Nuchal cord x 1 and true knot x 1.       Infant details        Infant observations: Weight: 4525 g (9 lb 15.6 oz)  Length: 22 in    Apgars: 6  @ 1 minute /    9  @ 5 minutes     Procedure Details:   The patient was taken to the operating room where regional anesthesia was found to be adequate. She was prepared and draped in the normal sterile fashion in the dorsal supine position with a leftward tilt. A Pfannenstiel skin incision was made with the scalpel and carried through to the underlying layer of fascia. The fascia was incised in the midline and the incision extended laterally with the Luque scissors. The superior aspect of the fascial incision was grasped with the Kocher clamps, elevated and the underlying rectus muscles dissected off sharply. Attention was then turned to the inferior aspect of the fascial incision, which was grasped and tented up with the Kocher clamps. The  underlying muscles were dissected off in a similar fashion. The rectus muscles were then  in the midline, and the peritoneum identified, and entered bluntly. The peritoneal incision was extended superiorly and inferiorly with good visualization of the bladder. The Anupam retractor was inserted atraumatically. The vesicouterine peritoneum was identified, grasped with the pickups, entered sharply, and the bladder flap was created digitally.     A low transverse uterine incision was made with the scalpel well above the bladder reflection and extended in a cephalad/caudad fashion. The infant’s head was difficult to deliver and a Kiwi vacuum was applied to assist, which was successful. The infant's shoulders and body were then delivered atraumatically. Cord clamping was delayed x 30 seconds with stimulation and suctioning of the nose and mouth. The cord was then clamped and cut and the infant was handed off to waiting staff.     The placenta was then removed with gentle traction on the cord and uterine massage. The uterus was cleared of all clots and debris with a wet lap sponge. The uterine incision was repaired with 0 Monocryl in a running locked fashion. A second layer of 0 Monocryl was placed in an imbricating fashion.    The Anupam retractor was removed and the gutters were cleared of all clots. The uterine incision was reinspected and found to be hemostatic with minimal Bovie cautery. Surgicel hemostatic matrix was applied for additional hemostasis.    The fascia was elevated and the rectus muscles and subfascial tissues were made hemostatic with the Bovie. The fascia was closed with 0 PDS in a running fashion. The subcutaneous tissues were then irrigated and the Bovie was used to achieve satisfactory hemostasis. The subcutaneous space was closed with 3-0 Vicryl in a running fashion. The skin was closed with 3-0 Monocryl in a running, subcuticular fashion. Surgical glue and a standard dressing were placed.  Sponge, lap, needle, and instrument counts were correct per the OR staff. The patient tolerated the procedure well and was taken to the recovery room in stable condition. She will be admitted to the postpartum unit for her post-operative care.      Complications:   None      Disposition:   Mother to Mother Baby/Postpartum in stable condition currently.   Baby to remain with the mother in stable condition currently.     Poonam Jackman MD   Obstetrics and Gynecology  [facility]  1/29/2025  18:43 EST

## 2025-01-29 NOTE — ANESTHESIA POSTPROCEDURE EVALUATION
Patient: Conchita Perez    Procedure Summary       Date: 25 Room / Location: [facility] OR  /  [facility]    Anesthesia Start: 825 Anesthesia Stop:     Procedure:  SECTION PRIMARY (Abdomen) Diagnosis:       Non-reassuring fetal heart tones complicating pregnancy, antepartum      (Non-reassuring fetal heart tones complicating pregnancy, antepartum [O36.8390])    Surgeons: Poonam Jackman MD Provider: Eric Damon CRNA    Anesthesia Type: epidural ASA Status: 2            Anesthesia Type: epidural    Vitals  Vitals Value Taken Time   /100 25   Temp 98.8 °F (37.1 °C) 25 1847   Pulse 96 25   Resp 18 25 1858   SpO2 95 % 25   Vitals shown include unfiled device data.        Post Anesthesia Care and Evaluation    Patient location during evaluation: bedside  Patient participation: complete - patient participated  Level of consciousness: awake  Pain score: 1  Pain management: adequate    Airway patency: patent  Anesthetic complications: No anesthetic complications  PONV Status: controlled  Cardiovascular status: acceptable and stable  Respiratory status: acceptable  Hydration status: acceptable    Comments: See Nursing record for post procedural Vital Signs as per protocol

## 2025-01-29 NOTE — PLAN OF CARE
Goal Outcome Evaluation:  Plan of Care Reviewed With: patient, spouse        Progress: improving  Patient POC is for Induction of Labor. Patient has forrester bulb placement and pain level 2/10. Patient has birth plan and reviewed with next shift RN. - Brittni Carrasco RN.

## 2025-01-29 NOTE — PROGRESS NOTES
ARLEEN Hart  Conchita Perez  : 1995  MRN: [id number]  CSN: [id number]    Labor progress note    Subjective   In to assess pushing. Soo is doing well and wishes to continue pushing if possible.     Objective   Min/max vitals past 24 hours:  Temp  Min: 97.9 °F (36.6 °C)  Max: 101 °F (38.3 °C)   BP  Min: 101/62  Max: 133/67   Pulse  Min: 69  Max: 107   Resp  Min: 16  Max: 18        FHT's: Baseline 150, minimal variability, neg accels, variable decels with pushing   Cervix: was checked (by me): 10 cm / 100 % / 0   Contractions: Q2-3 min      Assessment   Conchita Perez is a 29 y.o.  at 41w1d undergoing IOL, now with prolonged second stage of labor and isolated maternal fever.     Plan   I discussed with Soo and her  the concern for minimal descent with three hours of pushing, as well as likely early chorioamnionitis and fetal heart rate tracing concerns. We reviewed that none of these findings individually are absolute indications for CS but that I would recommend considering CS at this time. She strongly desires further attempt at vaginal delivery, thus we agreed that as long as fetal heart rate tracing does not further deteriorate, will plan for 30-45 min additional pushing. If no significant descent in this time frame, will proceed with pCS.    Poonam Jackman MD   2025  15:47 EST            ADDENDUM:  Reassessed at 1620 and no significant descent noted. Fetal heart rate also found to be in the 170s, consistent with chorioamnionitis. CS recommended and Soo and her  are in agreement to proceed. OR notified and team called in.

## 2025-01-29 NOTE — NON STRESS TEST
Conchita Perez, a  at 41w0d with an ANIVAL of 2025, by Last Menstrual Period, was seen at [facility] for a nonstress test.    Chief Complaint   Patient presents with    Scheduled Induction       Patient Active Problem List   Diagnosis    Migraine without aura and without status migrainosus, not intractable    Anxiety    Acne vulgaris    Missed     Excessive fetal growth affecting management of mother in third trimester, antepartum    Pregnancy       Start Time: 1545  Stop Time: 1630    Interpretation A  Nonstress Test Interpretation A: Reactive

## 2025-01-30 PROBLEM — Z34.90 PREGNANCY: Status: ACTIVE | Noted: 2025-01-30

## 2025-01-30 LAB
BASOPHILS # BLD AUTO: 0.01 10*3/MM3 (ref 0–0.2)
BASOPHILS NFR BLD AUTO: 0.1 % (ref 0–1.5)
DEPRECATED RDW RBC AUTO: 45.1 FL (ref 37–54)
EOSINOPHIL # BLD AUTO: 0.02 10*3/MM3 (ref 0–0.4)
EOSINOPHIL NFR BLD AUTO: 0.1 % (ref 0.3–6.2)
ERYTHROCYTE [DISTWIDTH] IN BLOOD BY AUTOMATED COUNT: 13.4 % (ref 12.3–15.4)
HCT VFR BLD AUTO: 28.5 % (ref 34–46.6)
HGB BLD-MCNC: 9.6 G/DL (ref 12–15.9)
IMM GRANULOCYTES # BLD AUTO: 0.08 10*3/MM3 (ref 0–0.05)
IMM GRANULOCYTES NFR BLD AUTO: 0.5 % (ref 0–0.5)
LYMPHOCYTES # BLD AUTO: 1.04 10*3/MM3 (ref 0.7–3.1)
LYMPHOCYTES NFR BLD AUTO: 7.1 % (ref 19.6–45.3)
MCH RBC QN AUTO: 31.2 PG (ref 26.6–33)
MCHC RBC AUTO-ENTMCNC: 33.7 G/DL (ref 31.5–35.7)
MCV RBC AUTO: 92.5 FL (ref 79–97)
MONOCYTES # BLD AUTO: 1.34 10*3/MM3 (ref 0.1–0.9)
MONOCYTES NFR BLD AUTO: 9.2 % (ref 5–12)
NEUTROPHILS NFR BLD AUTO: 12.14 10*3/MM3 (ref 1.7–7)
NEUTROPHILS NFR BLD AUTO: 83 % (ref 42.7–76)
NRBC BLD AUTO-RTO: 0 /100 WBC (ref 0–0.2)
PLATELET # BLD AUTO: 164 10*3/MM3 (ref 140–450)
PMV BLD AUTO: 11.1 FL (ref 6–12)
RBC # BLD AUTO: 3.08 10*6/MM3 (ref 3.77–5.28)
WBC NRBC COR # BLD AUTO: 14.63 10*3/MM3 (ref 3.4–10.8)

## 2025-01-30 PROCEDURE — 25010000002 ENOXAPARIN PER 10 MG: Performed by: STUDENT IN AN ORGANIZED HEALTH CARE EDUCATION/TRAINING PROGRAM

## 2025-01-30 PROCEDURE — 85025 COMPLETE CBC W/AUTO DIFF WBC: CPT | Performed by: STUDENT IN AN ORGANIZED HEALTH CARE EDUCATION/TRAINING PROGRAM

## 2025-01-30 PROCEDURE — 25010000002 GENTAMICIN PER 80 MG: Performed by: STUDENT IN AN ORGANIZED HEALTH CARE EDUCATION/TRAINING PROGRAM

## 2025-01-30 PROCEDURE — 25010000002 CLINDAMYCIN 900 MG/50ML SOLUTION: Performed by: STUDENT IN AN ORGANIZED HEALTH CARE EDUCATION/TRAINING PROGRAM

## 2025-01-30 RX ORDER — FERROUS SULFATE 324(65)MG
324 TABLET, DELAYED RELEASE (ENTERIC COATED) ORAL 2 TIMES DAILY WITH MEALS
Status: DISCONTINUED | OUTPATIENT
Start: 2025-01-30 | End: 2025-01-30

## 2025-01-30 RX ADMIN — FERROUS SULFATE TAB EC 324 MG (65 MG FE EQUIVALENT) 324 MG: 324 (65 FE) TABLET DELAYED RESPONSE at 08:42

## 2025-01-30 RX ADMIN — PRENATAL VITAMINS-IRON FUMARATE 27 MG IRON-FOLIC ACID 0.8 MG TABLET 1 TABLET: at 08:42

## 2025-01-30 RX ADMIN — IBUPROFEN 600 MG: 600 TABLET ORAL at 18:08

## 2025-01-30 RX ADMIN — CLINDAMYCIN PHOSPHATE 900 MG: 900 INJECTION, SOLUTION INTRAVENOUS at 02:28

## 2025-01-30 RX ADMIN — ACETAMINOPHEN 1000 MG: 500 TABLET, FILM COATED ORAL at 20:50

## 2025-01-30 RX ADMIN — IBUPROFEN 600 MG: 600 TABLET ORAL at 06:33

## 2025-01-30 RX ADMIN — ENOXAPARIN SODIUM 40 MG: 100 INJECTION SUBCUTANEOUS at 20:51

## 2025-01-30 RX ADMIN — IBUPROFEN 600 MG: 600 TABLET ORAL at 12:01

## 2025-01-30 RX ADMIN — DOCUSATE SODIUM 100 MG: 100 CAPSULE, LIQUID FILLED ORAL at 20:50

## 2025-01-30 RX ADMIN — DOCUSATE SODIUM 100 MG: 100 CAPSULE, LIQUID FILLED ORAL at 08:42

## 2025-01-30 RX ADMIN — ACETAMINOPHEN 1000 MG: 500 TABLET, FILM COATED ORAL at 08:41

## 2025-01-30 RX ADMIN — GENTAMICIN SULFATE 440 MG: 40 INJECTION, SOLUTION INTRAMUSCULAR; INTRAVENOUS at 09:13

## 2025-01-30 RX ADMIN — FERROUS SULFATE TAB EC 324 MG (65 MG FE EQUIVALENT) 324 MG: 324 (65 FE) TABLET DELAYED RESPONSE at 18:08

## 2025-01-30 RX ADMIN — ACETAMINOPHEN 1000 MG: 500 TABLET, FILM COATED ORAL at 15:55

## 2025-01-30 RX ADMIN — IBUPROFEN 600 MG: 600 TABLET ORAL at 23:57

## 2025-01-30 RX ADMIN — ACETAMINOPHEN 1000 MG: 500 TABLET, FILM COATED ORAL at 03:25

## 2025-01-30 NOTE — PROGRESS NOTES
ARLEEN Tanner Perez  : 1995  MRN: [id number]  CSN: [id number]    Post-operative Day #1  Subjective   Her pain is well controlled.  Vaginal bleeding is appropriate amount.  Her catheter was removed this am and she hasn't voided yet. She is not passing gas and has not had a bowel movement.  Upon review of her respiratory system, she has no respiratory symptoms and and denies SOB, cough, wheezing, chest pain. She is breast feeding  and it is going well     Objective     Min/max vitals past 24 hours:   Temp  Min: 97.9 °F (36.6 °C)  Max: 101 °F (38.3 °C)  BP  Min: 96/73  Max: 139/78  Pulse  Min: 69  Max: 107  Resp  Min: 18  Max: 18        General: well developed; well nourished  no acute distress   Resp: breathing is unlabored   CV: Not performed.   Abdomen: soft, non-tender; no masses  incision is covered by bandage   Pelvic: Not performed   Ext: normal appearance with no cyanosis or edema and no calf tenderness     Last 3 values   Results from last 7 days   Lab Units 25  0657 25  1619   WBC 10*3/mm3 14.63* 10.42   HEMOGLOBIN g/dL 9.6* 12.3   HEMATOCRIT % 28.5* 36.8   PLATELETS 10*3/mm3 164 204     Lab Results   Component Value Date    ABO O 2025    RH Positive 2025    RUBELLAABIGG 10.00 2024    HEPBSAG Negative 2024          Assessment   POD #1 S/P Primary  (LTCS)  Anemia     Plan   Continue routine post-operative care  Ferrous Sulfate BID      Rand Singh, APRCONY  2025  09:11 EST

## 2025-01-30 NOTE — NURSING NOTE
RN transferred pt to postpartum in stable condition. Fundus firm at U, scant to light rubra lochia. Perineum is swollen due to pushing x4 hrs, keeping ice packs on perineum. Pt tolerating PO fluids and oriented to room and call light. Pt educated on safety and bedrest. Pt mother and significant other at bedside.

## 2025-01-30 NOTE — CASE MANAGEMENT/SOCIAL WORK
Case Management/Social Work    Patient Name:  Conchita Perez  YOB: 1995  MRN: [id number]  Admit Date:  1/28/2025      Consult for [facility]    Sw met with pt at bedside. PPD information given to pt.  Sw informed pt the hospital has behavioral health services available if pt is in need.  Described how telehealth meeting worked. Asked for her to let the nurse know if she would like to schedule an appointment with behavioral health. Confirmed with pt they have a car seat and are not in need of resources.      Electronically signed by:  MARY Moran  01/30/25 14:10 EST

## 2025-01-30 NOTE — PROGRESS NOTES
Patient: Conchita Perez  Procedure(s):   SECTION PRIMARY  Anesthesia type: epidural    Patient location: [facility] Surgical Floor  Last vitals:   Vitals:    25 0808   BP: 105/58   Pulse: 86   Resp: 18   Temp: 98.1 °F (36.7 °C)   SpO2: 94%     Level of consciousness: awake, alert, and oriented    Post-anesthesia pain: adequate analgesia  Airway patency: patent  Respiratory: unassisted, spontaneous ventilation, room air  Cardiovascular: stable and blood pressure at baseline  Hydration: euvolemic    Anesthetic complications: no      Pt sitting in bed feeding infant.  Been up walking, no difficulties.  Utilizing restroom.  Eating drinking without issues.  NARC

## 2025-01-30 NOTE — PLAN OF CARE
VSS. Elvin wnl. Bonding well with infant. Pain controlled with rx meds. Recieving IV abx. Ambulation encouraged. Continue plan of care.

## 2025-01-30 NOTE — PLAN OF CARE
Goal Outcome Evaluation:           Progress: improving     Pt delivered viable male infant via primary  section at 1741. Fundus firm at U with scant rubra lochia. Pt pushed x 4 hours and perineum is swollen. Bryant catheter in place, low grade temperature and getting antibiotics. Pt VSS and I&O WNL. Pt transferred to postpartum in stable condition.

## 2025-01-31 VITALS
OXYGEN SATURATION: 98 % | BODY MASS INDEX: 36.42 KG/M2 | TEMPERATURE: 97.6 F | DIASTOLIC BLOOD PRESSURE: 75 MMHG | SYSTOLIC BLOOD PRESSURE: 121 MMHG | RESPIRATION RATE: 19 BRPM | WEIGHT: 253.8 LBS | HEART RATE: 95 BPM

## 2025-01-31 PROBLEM — Z98.891 S/P CESAREAN SECTION: Status: ACTIVE | Noted: 2025-01-31

## 2025-01-31 RX ORDER — OXYCODONE HYDROCHLORIDE 5 MG/1
5 TABLET ORAL EVERY 6 HOURS PRN
Qty: 12 TABLET | Refills: 0 | Status: SHIPPED | OUTPATIENT
Start: 2025-01-31

## 2025-01-31 RX ORDER — IBUPROFEN 600 MG/1
600 TABLET, FILM COATED ORAL EVERY 8 HOURS PRN
Qty: 60 TABLET | Refills: 0 | Status: SHIPPED | OUTPATIENT
Start: 2025-01-31

## 2025-01-31 RX ORDER — PSEUDOEPHEDRINE HCL 30 MG
100 TABLET ORAL 2 TIMES DAILY PRN
Qty: 30 CAPSULE | Refills: 0 | Status: SHIPPED | OUTPATIENT
Start: 2025-01-31

## 2025-01-31 RX ORDER — ACETAMINOPHEN 500 MG
1000 TABLET ORAL EVERY 8 HOURS PRN
Qty: 60 TABLET | Refills: 0 | Status: SHIPPED | OUTPATIENT
Start: 2025-01-31

## 2025-01-31 RX ADMIN — ACETAMINOPHEN 1000 MG: 500 TABLET, FILM COATED ORAL at 03:30

## 2025-01-31 RX ADMIN — DOCUSATE SODIUM 100 MG: 100 CAPSULE, LIQUID FILLED ORAL at 08:50

## 2025-01-31 RX ADMIN — ACETAMINOPHEN 1000 MG: 500 TABLET, FILM COATED ORAL at 08:49

## 2025-01-31 RX ADMIN — PRENATAL VITAMINS-IRON FUMARATE 27 MG IRON-FOLIC ACID 0.8 MG TABLET 1 TABLET: at 08:50

## 2025-01-31 RX ADMIN — IBUPROFEN 600 MG: 600 TABLET ORAL at 05:59

## 2025-01-31 RX ADMIN — FERROUS SULFATE TAB EC 324 MG (65 MG FE EQUIVALENT) 324 MG: 324 (65 FE) TABLET DELAYED RESPONSE at 08:50

## 2025-01-31 NOTE — LACTATION NOTE
Lactation Consult Note    Evaluation Completed: 2025 14:30 EST  Patient Name: Conchita Perez  :  1995  MRN:  [id number]     REFERRAL  INFORMATION:                          Date of Referral: 25   Person Making Referral: nurse, patient  Maternal Reason for Referral: breastfeeding currently, milk supply concern, latch difficulty  Infant Reason for Referral: sleepy ([place])    DELIVERY HISTORY:PC/S,   Infant First Feeding: breastfeeding     Skin to skin initiation date/time: 2025 7:00 PM  Skin to skin end date/time: 2025 7:05 PM       MATERNAL ASSESSMENT:  Breast Size Issue: none (25 1000)  Breast Shape: Bilateral:, round (25 1000)  Breast Density: Bilateral:, full, soft (25)  Areola: Bilateral:, elastic (25)  Nipples: Bilateral:, everted, short, graspable (25 1000)  Left Nipple Symptoms: intact, tender (25 1000)  Right Nipple Symptoms: intact, tender (25 1000)  Breast Signs/Symptoms of Infection:  (none) (25 1000)    INFANT ASSESSMENT:  Information for the patient's :  Shikha Perez [[id number]]   No past medical history on file.      [place] boy 2 days old delivered at 41 weeks and 1 day gestation.  Was circumcised this morning and has been sleepy but is now woke up and is ready to eat.     MATERNAL INFANT FEEDING:     Maternal Emotional State: receptive, relaxed (25 1000)  Infant Positioning: clutch/football (25 1000)   Signs of Milk Transfer: deep jaw excursions noted, suck/swallow ratio (25 1000)  Pain with Feeding: no (25)        Comfort Measures Before/During Feeding: suction broken using finger, latch adjusted, infant position adjusted (25)     Comfort Measures Following Feeding: air-drying encouraged (25 1000)  Nipple Shape After Feeding, Left Breast: appropriately projected (25 1000)     Latch Assistance: minimal assistance, verbal guidance offered  (01/31/25 1000)     Placed baby errol Campbell skin to skin with Mom where he slowly woke up started rooting and trying to get to the nipple.  Showed mom how to get him nipple to nose so he would instinctively throw his head back and showed her how to get a [place] Latch on.  She stated the latch did not hurt.  Adrian had good jaw excursions with suck and adequate pauses in between.  More breast-feeding education done and all questions answered at this time.  Encouraged mom to give me a call through the outpatient lactation clinic number if she had any questions or concerns.     EQUIPMENT TYPE:  Breast Pump Type: double electric, personal, manual pump (01/31/25 1000)    Breast Care: Breastfeeding: breast milk to nipples (01/31/25 1000)  Breastfeeding Assistance: support offered, infant moved to breast, feeding cue recognition promoted, feeding on demand promoted, infant latch-on verified, infant suck/swallow verified (01/31/25 1000)     Breastfeeding Support: diary/feeding log utilized, encouragement provided, infant-patient separation minimized, lactation counseling provided, patient hydration promoted, patient nutrition promoted, patient rest encouraged (01/31/25 1000)          BREAST PUMPING:  Breast Pumping Interventions: post-feed pumping encouraged (if extra breastmilk needed) (01/31/25 1000)       LACTATION REFERRALS:  Lactation Referrals: outpatient lactation program (as needed.) (01/31/25 1000)

## 2025-01-31 NOTE — PLAN OF CARE
Problem: Adult Inpatient Plan of Care  Goal: Plan of Care Review  Outcome: Met  Flowsheets (Taken 1/31/2025 1043)  Outcome Evaluation: VSS, lochia WDL, positive bonding observed with infant, pain is controlled with current pain medication regimen, I/O adequate for discharge. Discharge today.  Goal: Patient-Specific Goal (Individualized)  Outcome: Met  Goal: Absence of Hospital-Acquired Illness or Injury  Outcome: Met  Intervention: Identify and Manage Fall Risk  Recent Flowsheet Documentation  Taken 1/31/2025 0849 by Dyana Jennings RN  Safety Promotion/Fall Prevention:   safety round/check completed   room organization consistent   nonskid shoes/slippers when out of bed   fall prevention program maintained   clutter free environment maintained   assistive device/personal items within reach   activity supervised  Intervention: Prevent Skin Injury  Recent Flowsheet Documentation  Taken 1/31/2025 0849 by Dyana Jennings RN  Body Position: sitting up in bed  Intervention: Prevent Infection  Recent Flowsheet Documentation  Taken 1/31/2025 0849 by Dyana Jennings RN  Infection Prevention:   visitors restricted/screened   single patient room provided   rest/sleep promoted   personal protective equipment utilized   hand hygiene promoted  Goal: Optimal Comfort and Wellbeing  Outcome: Met  Intervention: Monitor Pain and Promote Comfort  Recent Flowsheet Documentation  Taken 1/31/2025 0849 by Dyana Jennings RN  Pain Management Interventions:   around-the-clock dosing utilized   position adjusted  Intervention: Provide Person-Centered Care  Recent Flowsheet Documentation  Taken 1/31/2025 0849 by Dyana Jennings RN  Trust Relationship/Rapport:   care explained   choices provided   emotional support provided   empathic listening provided   questions answered   questions encouraged   reassurance provided   thoughts/feelings acknowledged  Goal: Readiness for Transition of Care  Outcome: Met     Problem: Labor  Goal: Hemostasis  Outcome:  Met  Goal: Stable Fetal Wellbeing  Outcome: Met  Intervention: Promote and Monitor Fetal Wellbeing  Recent Flowsheet Documentation  Taken 2025 by Dyana Jennings RN  Body Position: sitting up in bed  Goal: Effective Progression to Delivery  Outcome: Met  Goal: Absence of Infection Signs and Symptoms  Outcome: Met  Intervention: Prevent or Manage Infection  Recent Flowsheet Documentation  Taken 2025 by Dyana Jennings RN  Infection Prevention:   visitors restricted/screened   single patient room provided   rest/sleep promoted   personal protective equipment utilized   hand hygiene promoted  Goal: Acceptable Pain Control  Outcome: Met  Goal: Normal Uterine Contraction Pattern  Outcome: Met     Problem: Postpartum ( Delivery)  Goal: Successful Parent Role Transition  Outcome: Met  Goal: Hemostasis  Outcome: Met  Goal: Effective Bowel Elimination  Outcome: Met  Goal: Fluid and Electrolyte Balance  Outcome: Met  Goal: Absence of Infection Signs and Symptoms  Outcome: Met  Goal: Anesthesia/Sedation Recovery  Outcome: Met  Intervention: Optimize Anesthesia Recovery  Recent Flowsheet Documentation  Taken 2025 by Dyana Jennings RN  Safety Promotion/Fall Prevention:   safety round/check completed   room organization consistent   nonskid shoes/slippers when out of bed   fall prevention program maintained   clutter free environment maintained   assistive device/personal items within reach   activity supervised  Goal: Optimal Pain Control and Function  Outcome: Met  Intervention: Prevent or Manage Pain  Recent Flowsheet Documentation  Taken 2025 by Dyana Jennings RN  Pain Management Interventions:   around-the-clock dosing utilized   position adjusted  Goal: Nausea and Vomiting Relief  Outcome: Met  Goal: Effective Urinary Elimination  Outcome: Met  Goal: Effective Oxygenation and Ventilation  Outcome: Met   Goal Outcome Evaluation:              Outcome Evaluation: sushma JAQUEZ,  positive bonding observed with infant, pain is controlled with current pain medication regimen, I/O adequate for discharge. Discharge today.

## 2025-01-31 NOTE — PLAN OF CARE
Goal Outcome Evaluation:                 VSS,adquate I&O, pain well controlled, ambulating idependently, attemtive to infant cues

## 2025-01-31 NOTE — DISCHARGE SUMMARY
Discharge Summary     Tanner Perez  : 1995  MRN: [id number]  CSN: [id number]    Date of Admission: 2025   Date of Discharge:  2025   Delivering Physician: Poonam Jackman MD       Admission Diagnosis: Pregnancy [Z34.90]   Discharge Diagnosis: Same as above plus  Pregnancy at 41w1d - delivered       Procedures: Primary  (LTCS)     Hospital Course  See the completed operative report for details regarding antepartum course and delivery.    Her post-operative course was unremarkable.  On POD # 2 she felt like she was ready for D/C.  She was evaluated and it was determined she was able to be discharged to home.  She had no febrile morbidity. She had normal bowel and bladder function and was hemodynamically stable.  Her wound was healing well without obvious signs of infections. She is breast feeding  and it is going well. She is considering Mirena for birth control.    Exam  General: no acute distress  Respirations: unlabored  Abdomen: FF, incision clean dry intact    Infant  male fetus weighing 4525 g (9 lb 15.6 oz)  Apgars -  6 @ 1 minute /  9 @ 5 minutes.    Discharge labs  Lab Results   Component Value Date    WBC 14.63 (H) 2025    HGB 9.6 (L) 2025    HCT 28.5 (L) 2025     2025       Discharge Medications     Discharge Medications        New Medications        Instructions Start Date   acetaminophen 500 MG tablet  Commonly known as: TYLENOL   1,000 mg, Oral, Every 8 Hours PRN      docusate sodium 100 MG capsule   100 mg, Oral, 2 Times Daily PRN      ibuprofen 600 MG tablet  Commonly known as: ADVIL,MOTRIN   600 mg, Oral, Every 8 Hours PRN      oxyCODONE 5 MG immediate release tablet  Commonly known as: ROXICODONE   5 mg, Oral, Every 6 Hours PRN             Continue These Medications        Instructions Start Date   EPINEPHrine 0.3 MG/0.3ML solution auto-injector injection  Commonly known as: EPIPEN   Auvi-Q 0.3 mg/0.3 mL injection,  auto-injector   Take 1 auto as needed by injection route.      ferrous sulfate 325 (65 FE) MG tablet   325 mg, Oral, 2 Times Daily Before Meals      magnesium oxide 400 MG tablet  Commonly known as: MAG-OX   400 mg, Oral, Daily      ondansetron 8 MG tablet  Commonly known as: ZOFRAN       ondansetron ODT 4 MG disintegrating tablet  Commonly known as: ZOFRAN-ODT   4 mg, Translingual, Every 8 Hours PRN      prenatal (CLASSIC) vitamin 28-0.8 MG tablet tablet  Generic drug: prenatal vitamin   Daily             Stop These Medications      doxylamine 25 MG tablet  Commonly known as: UNISOM     vitamin B-6 25 MG tablet  Commonly known as: PYRIDOXINE              Discharge Disposition Home or Self Care   Condition on Discharge: good   Follow-up: 1 week with [facility].     Time spent on discharge: 30 minutes or less  Rand Singh, APRN  1/31/2025

## 2025-02-03 NOTE — OUTREACH NOTE
[facility]  IP Antepartum    Current Estimated Gestational Age: 41w0d      Questions/Answers      Flowsheet Row Responses   Best Method for Contacting Cell   Support Person Present Yes   Comment Rodney   Community Resources/Benefits currently in use: [employer]   Understanding of diagnosis/reason for admission: Patient understands, no questions or concerns at this time   Follow-up with patient in: 02/07/25                Indy BOOTH  Maternity Nurse Navigator    01/18/2025, 1618

## 2025-02-06 ENCOUNTER — OFFICE VISIT (OUTPATIENT)
Dept: OBSTETRICS AND GYNECOLOGY | Facility: CLINIC | Age: 30
End: 2025-02-06
Payer: COMMERCIAL

## 2025-02-06 VITALS
DIASTOLIC BLOOD PRESSURE: 78 MMHG | HEIGHT: 70 IN | BODY MASS INDEX: 32.35 KG/M2 | SYSTOLIC BLOOD PRESSURE: 132 MMHG | WEIGHT: 226 LBS

## 2025-02-06 DIAGNOSIS — Z09 POSTOPERATIVE FOLLOW-UP: Primary | ICD-10-CM

## 2025-02-06 DIAGNOSIS — Z98.891 S/P CESAREAN SECTION: ICD-10-CM

## 2025-02-06 DIAGNOSIS — Z30.8 ENCOUNTER FOR OTHER CONTRACEPTIVE MANAGEMENT: ICD-10-CM

## 2025-02-06 NOTE — PROGRESS NOTES
Chief Complaint  Post-op Follow-up (8 days post op C section. )     History of Present Illness:  Patient is 29 y.o.  who presents to [facility] here for her 1 week postoperative visit.  Patient had primary  section.  She is currently breast-feeding.  She reports her bleeding has been light in nature.  She has been taking her prenatal vitamins as well as iron supplements.  She reports having normal bowel movements.  She is voiding without difficulty.  She denies any fever or chills.  She is desiring Mirena IUD for contraception.  She had this in the past and did well.    History  Past Medical History:   Diagnosis Date    ADHD (attention deficit hyperactivity disorder)     Allergic 2021    Allergy to white [place] tree    Anxiety     Migraines     Visual impairment     Wears glasses      Current Outpatient Medications on File Prior to Visit   Medication Sig Dispense Refill    acetaminophen (TYLENOL) 500 MG tablet Take 2 tablets by mouth Every 8 (Eight) Hours As Needed for Mild Pain. 60 tablet 0    docusate sodium 100 MG capsule Take 1 capsule by mouth 2 (Two) Times a Day As Needed for Constipation. 30 capsule 0    EPINEPHrine (EPIPEN) 0.3 MG/0.3ML solution auto-injector injection Auvi-Q 0.3 mg/0.3 mL injection, auto-injector   Take 1 auto as needed by injection route.      ferrous sulfate 325 (65 FE) MG tablet Take 1 tablet by mouth 2 (Two) Times a Day Before Meals. 60 tablet 10    ibuprofen (ADVIL,MOTRIN) 600 MG tablet Take 1 tablet by mouth Every 8 (Eight) Hours As Needed for Mild Pain. 60 tablet 0    magnesium oxide (MAG-OX) 400 MG tablet Take 1 tablet by mouth Daily. 30 tablet 5    ondansetron (ZOFRAN) 8 MG tablet       ondansetron ODT (ZOFRAN-ODT) 4 MG disintegrating tablet Place 1 tablet on the tongue Every 8 (Eight) Hours As Needed for Nausea or Vomiting. 30 tablet 3    oxyCODONE (ROXICODONE) 5 MG immediate release tablet Take 1 tablet by mouth Every 6 (Six) Hours As  "Needed for Moderate Pain. 12 tablet 0    prenatal vitamin (prenatal, CLASSIC, vitamin) tablet Take  by mouth Daily.       No current facility-administered medications on file prior to visit.     No Known Allergies  Past Surgical History:   Procedure Laterality Date     SECTION N/A 2025    Procedure:  SECTION PRIMARY;  Surgeon: Poonam Jackman MD;  Location: [facility];  Service: Obstetrics;  Laterality: N/A;    D & C WITH SUCTION N/A 2024    Procedure: DILATATION AND CURETTAGE WITH SUCTION;  Surgeon: Jia Damico MD;  Location: [facility];  Service: Obstetrics/Gynecology;  Laterality: N/A;    EYE SURGERY      several wh she was a child     WISDOM TOOTH EXTRACTION       History reviewed. No pertinent family history.  Social History     Socioeconomic History    Marital status:    Tobacco Use    Smoking status: Never     Passive exposure: Never    Smokeless tobacco: Never   Vaping Use    Vaping status: Never Used   Substance and Sexual Activity    Alcohol use: Not Currently     Comment: occasionly 1-2 drinks/week    Drug use: Never    Sexual activity: Yes     Partners: Male     Birth control/protection: I.U.D.       Physical Examination:  Vital Signs: /78   Ht 177.8 cm (70\")   Wt 103 kg (226 lb)   BMI 32.43 kg/m²     General Appearance: alert, appears stated age, and cooperative  Breasts: Not performed.  Abdomen: no masses, no hepatomegaly, no splenomegaly, soft non-tender, no guarding, and no rebound tenderness  Pelvic: Not performed.    Data Review:  The following data was reviewed by: Jia Damico MD on 2025:     Labs:  CBC & Differential (2025 06:57)   Imaging:    Medical Records:  None    Assessment and Plan   1. S/P  section  Normal postoperative visit.  Instructions and precautions are given regarding activities as well as follow-up.    2. Postoperative follow-up  Instructions and precautions are given.  Patient to follow-up in 6 weeks.    3. " Encounter for other contraceptive management  I have discussed with the patient the various options for management of contraception.  Patient is desiring Mirena IUD.  Orders placed for IUD.  Plan insertion at 6 weeks.    4.  Postpartum anemia  To continue her iron supplements twice daily.  Follow-up CBC next visit.    Follow Up/Instructions:  Follow up as noted.  Patient was given instructions and counseling regarding her condition or for health maintenance advice. Please see specific information pulled into the AVS if appropriate.     Note: Speech recognition transcription software may have been used to dictate portions of this document.  An attempt at proofreading has been made though minor errors in transcription may still be present.    This note was electronically signed.  Jia Damico M.D.

## 2025-02-11 ENCOUNTER — PATIENT OUTREACH (OUTPATIENT)
Dept: LABOR AND DELIVERY | Facility: HOSPITAL | Age: 30
End: 2025-02-11
Payer: COMMERCIAL

## 2025-02-11 NOTE — OUTREACH NOTE
Motherhood Connection  Postpartum Check-In    Questions/Answers      Flowsheet Row Responses   Visit Setting Telephone   Best Method for Contacting Cell   OB Discharge Note Reviewed  Reviewed   OB Discharge Navigator Reviewed  Reviewed   OB Discharge Medications Reviewed  Reviewed    discharged home with mother? Yes   Current Pain Levels 0-10 0   At Rest Pain Levels 0-10 0   Pain level with activity 0-10 0   Acceptable Pain Level 0-10 0   Verbalized Emotional State Acceptance   Family/Support Network Spouse   Level of Involvement in Care Attentive, Supportive   Do you feel comfortable in your relationship with your baby? Yes   Have members of your household adjusted to your baby? Yes   Is the baby's father supportive and/or involved with the baby? Yes   How does your partner feel about the baby? Happy, Involved   Do you feel safe at home, school and work? Yes   Are you in a relationship with someone who threatens you or hurts you? No   Do you have the resources to keep yourself and your baby healthy and safe? Yes   Lochia (per patient report) [place] Red   Amount Scant   Number of pads per day 1   Lochia Odor None   Is patient breastfeeding? Yes, not pumping   Not pumping - Left Breast Soft, Nontender   Not pumping - Right Breast Soft, Nontender   Not pumping - Left Nipple Intact   Not pumping - Right nipple Intact   Breast Care Breast Pads   Postpartum Depression Screening Education Education Provided   Doctor Appointments: Education Provided   Breastfeeding Education Education Provided   Family Planning Education Education Provided   Postpartum Care Education Education Provided   S & S to report Education Provided   Followup Appointments Made Yes   Well Child Visit Appointments Made Yes   Appointment Date 03/10/25   Well Child Check Up Date: 25   Did you complete the visit? Yes   Were there any specific concerns? No   Umbilical Cord No reported signs or symptoms   Was the baby circumcised? Yes    Circumcision care and signs/symptoms to report Reviewed   Feeding Readiness Cues: Crying, Eager   Infant Feeding Method Breast   Is a lactation referral indicated? No   Frequency of feedings 2-3hrs   Number of wet diapers x 24 hours 8   Last BM x 24 hours 3   Emesis (Unmeasured Occurence) 0   What safe sleep surface is available? Bassinet   Are there stuffed animals, toys, pillows, quilts, blankets, wedges, positioners, bumpers or other loose bedding in the infant's sleeping environment? No   Where does the baby usually sleep? Bassinet   Does the baby ever share a sleep surface with a sibling, adult or pet? No   Does the baby ever share a sleep surface in a bed, couch, recliner or other? No   What position do you place your baby to sleep for naps? Back   What position do you place your baby to sleep at night Back   Are you and/or other caregivers smoking inside or outside the baby's home? No   Is the infant dressed appropiately for the temperature of the home? Yes   Do you use a clean, dry pacifier that is not attached to a string or stuffed animal? No            Review of Systems    Most Recent [place]  Depression Scale Score (EPDS)    Performed by a clinician: 7 (2025  3:49 PM)    Received via [employer] questionnaire:  ()         5 Ps Screen  Completed    Conchita is feeling well since going home. Minimal pain and lochia WNL. No difficulties breastfeeding. States mood has been stable. Reports good family support. Baby doing well with no concerns at follow up appointment.   Updated resources provided via [employer] message. Encouraged to look at both the [employer] message and in the Visits tab for educational items pertaining to her recovery in the AVS. RN from call center to f/u next week. Contact information provided. Encouraged to call with questions, concerns, or for support before then.    Indy BOOTH  Maternity Nurse Navigator    2025, 16:10 EST

## 2025-02-18 ENCOUNTER — PATIENT OUTREACH (OUTPATIENT)
Dept: CALL CENTER | Facility: HOSPITAL | Age: 30
End: 2025-02-18
Payer: COMMERCIAL

## 2025-02-18 NOTE — OUTREACH NOTE
Motherhood Connection Survey      Flowsheet Row Responses   [facility] patient discharged from? [place]   Week 1 attempt successful? Yes   Call start time 1405   Call end time 1411   Baby sex Boy    discharged home with mother? Yes   Baby sex Boy   Delivery type    Emotional state Acceptance   Family support Yes   Do you have all necessary resources to care for you and your baby?  Yes   Have members of your household adjusted to your baby? Yes   Did you have any problems with pre-eclampsia during this pregnancy? No   Did you have blood glucose issues during this pregnancy No   Lochia amount Light   Lochia per patient report [place]   Did you have an episiotomy/tear/abdominal incision? Yes   Are you experiencing Increased redness/swelling/pain or drainage at the site, Foul odor, Incisional warmth, Fever > 100.3   Feeding Method Breast   Frequency every 2 hours   Supplementing Breast Milk   Breast Condition No   Nipple Condition No   Nursing Interventions Supportive bra   Signs baby is ready to eat Crying, Rooting   Feeding tolerance Wet/dirty diapers   Number of wet diapers x 24 hours 10-12   Last BM x 24 hours 10   Umbilical Cord Other   Was the baby circumcised? Yes   Circumcision care and signs/symptoms to report Reviewed   Where does the baby usually sleep? Bassinet   Are there stuffed animals, toys, pillows, quilts, blankets, wedges, positioners, bumpers or other loose bedding in the infant's sleeping environment? No   Does the baby ever share a sleep surface in a bed, couch, recliner or other? No   What position do you lay your baby down to sleep? Back   Are you and/or other caregivers smoking inside or outside the baby's home? No   Mom appointment comments: has had a 1 week f/u   Baby appointment comments: has peds appt   Call completed? Yes   How satisfied were you with the Motherhood Connection Program? 5   Anyone you would like to recognize from your time in the Motherhood Connection  Program Gisella FOSTER - Registered Nurse

## 2025-03-07 ENCOUNTER — OFFICE VISIT (OUTPATIENT)
Dept: INTERNAL MEDICINE | Facility: CLINIC | Age: 30
End: 2025-03-07
Payer: COMMERCIAL

## 2025-03-07 VITALS
HEIGHT: 70 IN | WEIGHT: 222 LBS | HEART RATE: 93 BPM | RESPIRATION RATE: 16 BRPM | TEMPERATURE: 97.8 F | BODY MASS INDEX: 31.78 KG/M2 | OXYGEN SATURATION: 97 % | SYSTOLIC BLOOD PRESSURE: 110 MMHG | DIASTOLIC BLOOD PRESSURE: 68 MMHG

## 2025-03-07 DIAGNOSIS — G43.009 MIGRAINE WITHOUT AURA AND WITHOUT STATUS MIGRAINOSUS, NOT INTRACTABLE: ICD-10-CM

## 2025-03-07 DIAGNOSIS — E66.811 CLASS 1 OBESITY DUE TO EXCESS CALORIES WITHOUT SERIOUS COMORBIDITY WITH BODY MASS INDEX (BMI) OF 31.0 TO 31.9 IN ADULT: ICD-10-CM

## 2025-03-07 DIAGNOSIS — E66.09 CLASS 1 OBESITY DUE TO EXCESS CALORIES WITHOUT SERIOUS COMORBIDITY WITH BODY MASS INDEX (BMI) OF 31.0 TO 31.9 IN ADULT: ICD-10-CM

## 2025-03-07 DIAGNOSIS — F41.1 GENERALIZED ANXIETY DISORDER: ICD-10-CM

## 2025-03-07 DIAGNOSIS — Z00.00 ANNUAL PHYSICAL EXAM: Primary | ICD-10-CM

## 2025-03-07 PROCEDURE — 99395 PREV VISIT EST AGE 18-39: CPT | Performed by: NURSE PRACTITIONER

## 2025-03-07 NOTE — PROGRESS NOTES
Subjective   Conchita Perez is a 29 y.o. female and is here for a comprehensive physical exam. The patient reports no problems.    HPI:    Recently pregnant with a  delivery. Doing well post-partum, states she is breastfeeding, going well. Denies bleeding, or problems post delivery. Denies any behavioral symptoms such as depression.     BEN - still taking iron medication with no issues, denies constipation or fatigue. States following up with OB on Monday for repeat labs. Due to have IUD placed back on Monday. Previous follow up for incision went well.     Migraine - taking magnesium throughout pregnancy and continues to take post partum to help with headache prevention and management.       Health Habits:  Eye exam within last 2 years - yes   Dental exam every 6 months - yes  Exercise habits: No structured   Healthy diet: balanced diet.     The ASCVD Risk score (Grecia RODRIGUEZ, et al., 2019) failed to calculate for the following reasons:    The 2019 ASCVD risk score is only valid for ages 40 to 79      Do you take any herbs or supplements that were not prescribed by a doctor? no  Are you taking calcium supplements? No  Are you taking aspirin daily? No     History:  LMP: Patient's last menstrual period was 2024.  Menopause: No  Last pap date: 2024  Abnormal pap? no  Family history of breast or ovarian cancer: no         OB History    Para Term  AB Living   2 1 1 0 1 1   SAB IAB Ectopic Molar Multiple Live Births   1 0 0 0 0 1      # Outcome Date GA Lbr Gerardo/2nd Weight Sex Type Anes PTL Lv   2 Term 25 41w1d / 05:16 4525 g (9 lb 15.6 oz) M CS-LTranv EPI N KELI      Complications: Intraamniotic Infection, Failure to Progress in Second Stage, Fetal Intolerance   1 SAB 2024              reports being sexually active and has had partner(s) who are male. She reports using the following method of birth control/protection: I.U.D..        The following portions of the patient's history  were reviewed and updated as appropriate: She  has a past medical history of ADHD (attention deficit hyperactivity disorder), Allergic (2021), Anxiety, Migraines, Visual impairment, and Wears glasses.  She does not have any pertinent problems on file.  She  has a past surgical history that includes Eye surgery; [place] tooth extraction; d & c with suction (N/A, 2024); and  section (N/A, 2025).  Her family history is not on file.  She  reports that she has never smoked. She has never been exposed to tobacco smoke. She has never used smokeless tobacco. She reports that she does not currently use alcohol. She reports that she does not use drugs.  Current Outpatient Medications   Medication Sig Dispense Refill    EPINEPHrine (EPIPEN) 0.3 MG/0.3ML solution auto-injector injection Auvi-Q 0.3 mg/0.3 mL injection, auto-injector   Take 1 auto as needed by injection route.      ferrous sulfate 325 (65 FE) MG tablet Take 1 tablet by mouth 2 (Two) Times a Day Before Meals. 60 tablet 10    magnesium oxide (MAG-OX) 400 MG tablet Take 1 tablet by mouth Daily. 30 tablet 5    prenatal vitamin (prenatal, CLASSIC, vitamin) tablet Take  by mouth Daily.      Levonorgestrel (MIRENA) 20 MCG/DAY intrauterine device IUD To be inserted one time by prescriber. Route intrauterine. 1 each 0     No current facility-administered medications for this visit.       Review of Systems  Review of Systems   Constitutional:  Negative for activity change, appetite change, diaphoresis, fatigue, fever, unexpected weight gain and unexpected weight loss.   Eyes:  Negative for blurred vision and double vision.   Respiratory:  Negative for cough, chest tightness, shortness of breath and wheezing.    Cardiovascular:  Negative for chest pain, palpitations and leg swelling.   Gastrointestinal:  Negative for abdominal distention, abdominal pain, constipation, diarrhea, nausea, vomiting and GERD.   Genitourinary:  Negative for decreased urine  "volume, difficulty urinating and dysuria.   Skin:  Negative for dry skin and pallor.   Neurological:  Negative for dizziness and headache.   Psychiatric/Behavioral:  Negative for agitation, behavioral problems, self-injury, depressed mood and stress. The patient is not nervous/anxious.    Objective   /68   Pulse 93   Temp 97.8 °F (36.6 °C)   Resp 16   Ht 177.8 cm (70\")   Wt 101 kg (222 lb)   LMP 04/16/2024   SpO2 97%   Breastfeeding Yes   BMI 31.85 kg/m²     Physical Exam  Vitals and nursing note reviewed.   Constitutional:       Appearance: Normal appearance.   HENT:      Right Ear: Tympanic membrane, ear canal and external ear normal.      Left Ear: Tympanic membrane, ear canal and external ear normal.      Nose: Nose normal.      Mouth/Throat:      Mouth: Mucous membranes are moist.      Pharynx: Oropharynx is clear.   Eyes:      Extraocular Movements: Extraocular movements intact.      Pupils: Pupils are equal, round, and reactive to light.      Funduscopic exam:     Right eye: Red reflex present.         Left eye: Red reflex present.  Neck:      Thyroid: No thyromegaly or thyroid tenderness.      Vascular: No carotid bruit.   Cardiovascular:      Rate and Rhythm: Normal rate and regular rhythm.      Pulses: Normal pulses.      Heart sounds: Normal heart sounds. No murmur heard.     No gallop.   Pulmonary:      Effort: Pulmonary effort is normal. No respiratory distress.      Breath sounds: Normal breath sounds. No wheezing, rhonchi or rales.   Abdominal:      General: Bowel sounds are normal. There is no distension.      Palpations: Abdomen is soft.      Tenderness: There is no abdominal tenderness. There is no guarding or rebound.      Hernia: No hernia is present.   Musculoskeletal:         General: No tenderness. Normal range of motion.      Cervical back: Normal range of motion and neck supple.      Right lower leg: No edema.      Left lower leg: No edema.   Lymphadenopathy:      Cervical: No " cervical adenopathy.   Skin:     General: Skin is warm and dry.      Coloration: Skin is not pale.      Findings: No rash.   Neurological:      General: No focal deficit present.      Mental Status: She is alert and oriented to person, place, and time.   Psychiatric:         Mood and Affect: Mood normal.         Behavior: Behavior normal.            Assessment & Plan   Healthy female exam.    Diagnosis Plan   1. Annual physical exam  Preventative measures discussed. Labs recently updated through OB/GYN.       2. Generalized anxiety disorder  Stable. Recommend daily sun exposure and structured exercise. Encourage fresh fruits and vegetables daily and healthy habits.       3. Migraine without aura and without status migrainosus, not intractable  Stable. Continue OTC magnesium.       4. Class 1 obesity due to excess calories without serious comorbidity with body mass index (BMI) of 31.0 to 31.9 in adult  Increase structured exercise. Recommend high protein, low carb diet.             2. Patient Counseling:  --Nutrition: Recommend high protein low carbohydrate diet, portion control, moderate sodium and caffeine intake, and 1 g folic acid supplementation if childbearing age.  --Discussed the issue of calcium supplement, and the daily use of baby aspirin if applicable.               --Screening mammogram to start at age 40 and repeat every 1-2 years. Monthly breast exams by patient recommended.   --Exercise: discussed impact of regular exercise on healthy with goal of 30 minutes of moderate-intensity exercise 4-5 times/week.  --Substance Abuse: Discussed cessation/primary prevention of tobacco (if applicable), alcohol, or other drug use (if applicable); driving or other dangerous activities under the influence; availability of treatment for abuse.   --Sexuality: Discussed sexually transmitted diseases, partner selection, use of condoms, avoidance of unintended pregnancy  and contraceptive alternatives.   --Injury  prevention: Discussed safety belts, safety helmets, smoke detector, smoking near bedding or upholstery, avoid distracted driving (texting/phone use).  --Dental health: Discussed importance of regular tooth brushing, flossing, and dental visits every 6 months.  --Immunizations reviewed and recommend staying up-to-date.   --Discussed benefits of screening colonoscopy vs cologuard testing for low risk individuals (if applicable).  --After hours service discussed with patient    3. Discussed the patient's BMI with her.  The BMI is above average; BMI management plan is completed  BMI is >= 30 and <35. (Class 1 Obesity). The following options were offered after discussion;: exercise counseling/recommendations     4. Return in about 1 year (around 3/7/2026) for Annual physical.  This note accurately reflects work and decisions made by me.    Alexandra Epley, APRN Student/KARIN Cooper  03/07/2025  13:42 EST

## 2025-03-10 ENCOUNTER — POSTPARTUM VISIT (OUTPATIENT)
Dept: OBSTETRICS AND GYNECOLOGY | Facility: CLINIC | Age: 30
End: 2025-03-10
Payer: COMMERCIAL

## 2025-03-10 VITALS
DIASTOLIC BLOOD PRESSURE: 80 MMHG | HEIGHT: 70 IN | SYSTOLIC BLOOD PRESSURE: 132 MMHG | WEIGHT: 222 LBS | BODY MASS INDEX: 31.78 KG/M2

## 2025-03-10 DIAGNOSIS — Z30.430 ENCOUNTER FOR INSERTION OF MIRENA IUD: Primary | ICD-10-CM

## 2025-03-10 PROBLEM — Z98.891 S/P CESAREAN SECTION: Status: RESOLVED | Noted: 2025-01-31 | Resolved: 2025-03-10

## 2025-03-10 PROBLEM — O36.63X0 EXCESSIVE FETAL GROWTH AFFECTING MANAGEMENT OF MOTHER IN THIRD TRIMESTER, ANTEPARTUM: Status: RESOLVED | Noted: 2024-12-11 | Resolved: 2025-03-10

## 2025-03-10 PROBLEM — Z34.90 PREGNANCY: Status: RESOLVED | Noted: 2025-01-30 | Resolved: 2025-03-10

## 2025-03-10 PROBLEM — O48.0 POST TERM PREGNANCY AT 41 WEEKS GESTATION: Status: RESOLVED | Noted: 2025-01-28 | Resolved: 2025-03-10

## 2025-03-10 PROBLEM — O41.1230 CHORIOAMNIONITIS IN THIRD TRIMESTER: Status: RESOLVED | Noted: 2025-01-29 | Resolved: 2025-03-10

## 2025-03-10 PROBLEM — O36.8390 NON-REASSURING FETAL HEART TONES COMPLICATING PREGNANCY, ANTEPARTUM: Status: RESOLVED | Noted: 2025-01-24 | Resolved: 2025-03-10

## 2025-03-10 PROBLEM — Z3A.41 POST TERM PREGNANCY AT 41 WEEKS GESTATION: Status: RESOLVED | Noted: 2025-01-28 | Resolved: 2025-03-10

## 2025-03-10 PROBLEM — O02.1 MISSED ABORTION: Status: RESOLVED | Noted: 2024-01-10 | Resolved: 2025-03-10

## 2025-03-10 LAB
B-HCG UR QL: NEGATIVE
EXPIRATION DATE: NORMAL
INTERNAL NEGATIVE CONTROL: NEGATIVE
INTERNAL POSITIVE CONTROL: POSITIVE
Lab: NORMAL

## 2025-03-10 NOTE — PROGRESS NOTES
"History  Chief Complaint   Patient presents with    Postpartum Care     6 week postpartum care         Conchita Perez is a 29 y.o. year old  presenting to be seen for her postpartum visit.  She had a Primary  ([facility]).    Since delivery she has been sexually active. She has used condoms.  She does not have concerns about post-partum blues/depression.   She is breast feeding .  For ongoing contraception, her plans are IUD - Mirena.  She has not had a menstrual cycle.         Physical  /80   Ht 177.8 cm (70\")   Wt 101 kg (222 lb)   LMP 2024   Breastfeeding Yes   BMI 31.85 kg/m²     General:  well developed; well nourished  no acute distress  mentation appropriate   Abdomen: soft, non-tender; no masses  incision is healed   Pelvis: Clinical staff was present for exam  External genitalia:  normal appearance of the external genitalia including Barth[place]'s and [place]'s glands.  Vaginal:  normal pink mucosa without prolapse or lesions.  Cervix:  normal appearance.  Uterus:  normal size, shape and consistency.  Adnexa:  normal bimanual exam of the adnexa.     IUD Insertion      Date of procedure:  3/10/2025    Risks and benefits discussed? yes  All questions answered? yes  Consents given by The patient  Written consent obtained? yes    Local anesthesia used:  no    Procedure documentation:    After verifying the patient had a low probability of being pregnant and met the criteria for insertion, a sterile speculum has placed and the cervix was cleansed with an antiseptic solution.  Vaginal discharge was scant.  The anterior lip of the cervix was grasped with a tenaculum and the uterine cavity was gently sounded. There was mild difficulty passing the sound through the cervix.  Cervical dilation did not need to be performed prior to placing the IUD.  The uterus was midposition and sounded to 8 cms.  The Mirena was then prepared per the manufacturers instructions.    The Mirena was advanced to a " point 2 cms from the fundus and then the arms were released from the sheath.  The device was advanced to the fundus and the device was released fully from the sheath.. The string was cut 3 cms in length.  Bleeding from the cervix was scant.    She tolerated the procedure without any difficulty.     Post procedure instructions: It was reviewed that the Mirena will not alter the timing of when she bleeds but it may decrease the quantity of flow and cramps.  Roughly 30% of people will be amenorrheic over time.  Efficacy rate of 99.2% over 5 years was discussed.  Spontaneous expulsion rate of 1-2% was also discussed.  If she has any issue with fever or excessive bleeding or pain she is to call the office immediately.  Otherwise I would like to see her back in 5 weeks for f/u appt.       Assessment   Normal 6 week postpartum exam  Contraceptive management     Plan   BC options reviewed and compared today: IUD - Mirena  Pap smear Normal 7/3/24  Follow up 5 weeks for IUD check    No orders of the defined types were placed in this encounter.         This note was electronically signed.  Rand Singh, KARIN  3/10/2025

## 2025-04-15 ENCOUNTER — OFFICE VISIT (OUTPATIENT)
Dept: OBSTETRICS AND GYNECOLOGY | Facility: CLINIC | Age: 30
End: 2025-04-15
Payer: COMMERCIAL

## 2025-04-15 VITALS
BODY MASS INDEX: 32.64 KG/M2 | DIASTOLIC BLOOD PRESSURE: 74 MMHG | SYSTOLIC BLOOD PRESSURE: 114 MMHG | WEIGHT: 228 LBS | HEIGHT: 70 IN

## 2025-04-15 DIAGNOSIS — Z30.431 IUD CHECK UP: Primary | ICD-10-CM

## 2025-04-15 PROCEDURE — 99212 OFFICE O/P EST SF 10 MIN: CPT | Performed by: MIDWIFE

## 2025-04-15 NOTE — PROGRESS NOTES
"Subjective   Chief Complaint   Patient presents with    Contraception     No complaints/concerns      Conchita Perez is a 29 y.o. year old  presenting to be seen for follow-up of her recently placed Mirena.  Since the time of insertion flow is typically light. Pt denies vaginal discharge. She denies fever/chills.  She denies cramping or abdominal pain. She has been sexually active.  [place] has not been painful for her.   [place] has not been painful for her partner.    OTHER COMPLAINTS:  Nothing else             Objective   /74   Ht 177.8 cm (70\")   Wt 103 kg (228 lb)   BMI 32.71 kg/m²       General:  well developed; well nourished  no acute distress     Abdomen: soft, non-tender; no masses     Pelvis: Clinical staff was present for exam  External genitalia:  normal appearance of the external genitalia including Bartholin's and [place]'s glands.  Cervix:  normal appearance. IUD string present - 3 cms in length;       Imaging   No data reviewed       Assessment   Encounter for surveillance of IUD     Plan   Instructions and precautions given.  All questions answered.  Meds were prescribed - no  Follow up 1 year    No orders of the defined types were placed in this encounter.         This note was electronically signed.  KARIN Martinez      "

## (undated) DEVICE — GLV SURG BIOGEL M LTX PF 6 1/2

## (undated) DEVICE — SPECIMEN SOCK - LARGE PORT: Brand: MEDI-VAC

## (undated) DEVICE — MICRO HVTSA, 0.5G AND HVTSA SOURCEMARK PRODUCT CODE M1206 AND M1206-01: Brand: EXOFIN MICRO HVTSA, 0.5G

## (undated) DEVICE — SOL IRR NACL 0.9PCT BO 1000ML

## (undated) DEVICE — HNDL SWVL PRECONN W/TBG 6FT 1/2IN

## (undated) DEVICE — GLV SURG SENSICARE POLYISPRN W/ALOE PF LF 6.5 GRN STRL

## (undated) DEVICE — ANTIBACTERIAL UNDYED BRAIDED (POLYGLACTIN 910), SYNTHETIC ABSORBABLE SUTURE: Brand: COATED VICRYL

## (undated) DEVICE — SOL IRR H2O BTL 1000ML STRL

## (undated) DEVICE — ST COL BERKELY TBG

## (undated) DEVICE — SUT MNCRYL 0 CT 36IN

## (undated) DEVICE — DRSNG WND BORDR/ADHS NONADHR/GZ LF 4X10IN STRL

## (undated) DEVICE — GLV SURG BIOGEL PI ULTRATOUCH G SZ6 LF

## (undated) DEVICE — CURETTE UTER VAC OPNTP CRV 8MM STRL

## (undated) DEVICE — SUT GUT CHRM 2/0 SH 27IN G123H

## (undated) DEVICE — RICH C-SECTION: Brand: MEDLINE INDUSTRIES, INC.

## (undated) DEVICE — RICH MINOR LITHOTOMY: Brand: MEDLINE INDUSTRIES, INC.

## (undated) DEVICE — SLV SCD CALF HEMOFORCE DVT THERP REPROC MD

## (undated) DEVICE — GOWN SURG ORBIS LVL3 LG STRL

## (undated) DEVICE — KIWI® VACUUM DELIVERY SYSTEM, OMNICUP® WITH TRACTION FORCE INDICATOR: Brand: KIWI® OMNICUP®

## (undated) DEVICE — LARGE, DISPOSABLE ALEXIS O C-SECTION PROTECTOR - RETRACTOR: Brand: ALEXIS ® O C-SECTION PROTECTOR - RETRACTOR

## (undated) DEVICE — PENCL SMOKE/EVAC MEGADYNE TELESCP 10FT

## (undated) DEVICE — SUT MNCRYL 3/0 KS 27IN UD MCP523H

## (undated) DEVICE — SUT PDS 0 CT 36IN VIO PDP358T